# Patient Record
Sex: FEMALE | Race: WHITE | Employment: OTHER | URBAN - METROPOLITAN AREA
[De-identification: names, ages, dates, MRNs, and addresses within clinical notes are randomized per-mention and may not be internally consistent; named-entity substitution may affect disease eponyms.]

---

## 2017-01-05 ENCOUNTER — APPOINTMENT (OUTPATIENT)
Dept: GENERAL RADIOLOGY | Age: 72
DRG: 871 | End: 2017-01-05
Attending: EMERGENCY MEDICINE
Payer: MEDICARE

## 2017-01-05 ENCOUNTER — HOSPITAL ENCOUNTER (INPATIENT)
Age: 72
LOS: 12 days | Discharge: HOME | DRG: 871 | End: 2017-01-17
Attending: EMERGENCY MEDICINE | Admitting: FAMILY MEDICINE
Payer: MEDICARE

## 2017-01-05 DIAGNOSIS — J18.9 CAP (COMMUNITY ACQUIRED PNEUMONIA): Primary | ICD-10-CM

## 2017-01-05 PROBLEM — A41.9 SEPSIS (HCC): Status: ACTIVE | Noted: 2017-01-05

## 2017-01-05 LAB
ALBUMIN SERPL BCP-MCNC: 2.9 G/DL (ref 3.4–5)
ALBUMIN/GLOB SERPL: 1 {RATIO} (ref 0.8–1.7)
ALP SERPL-CCNC: 96 U/L (ref 45–117)
ALT SERPL-CCNC: 23 U/L (ref 13–56)
ANION GAP BLD CALC-SCNC: 12 MMOL/L (ref 3–18)
AST SERPL W P-5'-P-CCNC: 28 U/L (ref 15–37)
BASOPHILS # BLD AUTO: 0 K/UL (ref 0–0.1)
BASOPHILS # BLD: 0 % (ref 0–3)
BILIRUB SERPL-MCNC: 1.2 MG/DL (ref 0.2–1)
BUN SERPL-MCNC: 23 MG/DL (ref 7–18)
BUN/CREAT SERPL: 27 (ref 12–20)
CALCIUM SERPL-MCNC: 8.4 MG/DL (ref 8.5–10.1)
CHLORIDE SERPL-SCNC: 98 MMOL/L (ref 100–108)
CK MB CFR SERPL CALC: 0.8 % (ref 0–4)
CK MB SERPL-MCNC: 1.6 NG/ML (ref 0.5–3.6)
CK SERPL-CCNC: 205 U/L (ref 26–192)
CO2 SERPL-SCNC: 26 MMOL/L (ref 21–32)
CREAT SERPL-MCNC: 0.85 MG/DL (ref 0.6–1.3)
DIFFERENTIAL METHOD BLD: ABNORMAL
EOSINOPHIL # BLD: 0 K/UL (ref 0–0.4)
EOSINOPHIL NFR BLD: 0 % (ref 0–5)
ERYTHROCYTE [DISTWIDTH] IN BLOOD BY AUTOMATED COUNT: 12.8 % (ref 11.6–14.5)
GLOBULIN SER CALC-MCNC: 3 G/DL (ref 2–4)
GLUCOSE SERPL-MCNC: 66 MG/DL (ref 74–99)
HCT VFR BLD AUTO: 45.4 % (ref 35–45)
HGB BLD-MCNC: 16.2 G/DL (ref 12–16)
LACTATE BLD-SCNC: 1.7 MMOL/L (ref 0.4–2)
LACTATE BLD-SCNC: 2.3 MMOL/L (ref 0.4–2)
LYMPHOCYTES # BLD AUTO: 10 % (ref 20–51)
LYMPHOCYTES # BLD: 0.6 K/UL (ref 0.8–3.5)
MCH RBC QN AUTO: 41 PG (ref 24–34)
MCHC RBC AUTO-ENTMCNC: 35.7 G/DL (ref 31–37)
MCV RBC AUTO: 114.9 FL (ref 74–97)
MONOCYTES # BLD: 0.2 K/UL (ref 0–1)
MONOCYTES NFR BLD AUTO: 3 % (ref 2–9)
NEUTS BAND NFR BLD MANUAL: 46 % (ref 0–5)
NEUTS SEG # BLD: 2.3 K/UL (ref 1.8–8)
NEUTS SEG NFR BLD AUTO: 41 % (ref 42–75)
PLATELET # BLD AUTO: 220 K/UL (ref 135–420)
PLATELET COMMENTS,PCOM: ABNORMAL
PMV BLD AUTO: 10.2 FL (ref 9.2–11.8)
POTASSIUM SERPL-SCNC: 3.8 MMOL/L (ref 3.5–5.5)
PROT SERPL-MCNC: 5.9 G/DL (ref 6.4–8.2)
RBC # BLD AUTO: 3.95 M/UL (ref 4.2–5.3)
RBC MORPH BLD: ABNORMAL
SODIUM SERPL-SCNC: 136 MMOL/L (ref 136–145)
TROPONIN I SERPL-MCNC: <0.02 NG/ML (ref 0–0.04)
WBC # BLD AUTO: 5.5 K/UL (ref 4.6–13.2)

## 2017-01-05 PROCEDURE — 87077 CULTURE AEROBIC IDENTIFY: CPT | Performed by: EMERGENCY MEDICINE

## 2017-01-05 PROCEDURE — 82550 ASSAY OF CK (CPK): CPT | Performed by: EMERGENCY MEDICINE

## 2017-01-05 PROCEDURE — 74011250636 HC RX REV CODE- 250/636: Performed by: EMERGENCY MEDICINE

## 2017-01-05 PROCEDURE — 96360 HYDRATION IV INFUSION INIT: CPT

## 2017-01-05 PROCEDURE — 96361 HYDRATE IV INFUSION ADD-ON: CPT

## 2017-01-05 PROCEDURE — 74011000250 HC RX REV CODE- 250: Performed by: EMERGENCY MEDICINE

## 2017-01-05 PROCEDURE — 80053 COMPREHEN METABOLIC PANEL: CPT | Performed by: EMERGENCY MEDICINE

## 2017-01-05 PROCEDURE — 74011000250 HC RX REV CODE- 250: Performed by: FAMILY MEDICINE

## 2017-01-05 PROCEDURE — 93005 ELECTROCARDIOGRAM TRACING: CPT

## 2017-01-05 PROCEDURE — 87040 BLOOD CULTURE FOR BACTERIA: CPT | Performed by: EMERGENCY MEDICINE

## 2017-01-05 PROCEDURE — 85025 COMPLETE CBC W/AUTO DIFF WBC: CPT | Performed by: EMERGENCY MEDICINE

## 2017-01-05 PROCEDURE — 74011000258 HC RX REV CODE- 258: Performed by: EMERGENCY MEDICINE

## 2017-01-05 PROCEDURE — 96375 TX/PRO/DX INJ NEW DRUG ADDON: CPT

## 2017-01-05 PROCEDURE — 94640 AIRWAY INHALATION TREATMENT: CPT

## 2017-01-05 PROCEDURE — 96365 THER/PROPH/DIAG IV INF INIT: CPT

## 2017-01-05 PROCEDURE — 87186 SC STD MICRODIL/AGAR DIL: CPT | Performed by: EMERGENCY MEDICINE

## 2017-01-05 PROCEDURE — 99285 EMERGENCY DEPT VISIT HI MDM: CPT

## 2017-01-05 PROCEDURE — 74011250636 HC RX REV CODE- 250/636: Performed by: FAMILY MEDICINE

## 2017-01-05 PROCEDURE — 65660000000 HC RM CCU STEPDOWN

## 2017-01-05 PROCEDURE — 83605 ASSAY OF LACTIC ACID: CPT

## 2017-01-05 PROCEDURE — 77030013140 HC MSK NEB VYRM -A

## 2017-01-05 RX ORDER — ONDANSETRON 2 MG/ML
4 INJECTION INTRAMUSCULAR; INTRAVENOUS
Status: DISCONTINUED | OUTPATIENT
Start: 2017-01-05 | End: 2017-01-17 | Stop reason: HOSPADM

## 2017-01-05 RX ORDER — BUDESONIDE AND FORMOTEROL FUMARATE DIHYDRATE 160; 4.5 UG/1; UG/1
2 AEROSOL RESPIRATORY (INHALATION) 2 TIMES DAILY
COMMUNITY

## 2017-01-05 RX ORDER — IPRATROPIUM BROMIDE AND ALBUTEROL SULFATE 2.5; .5 MG/3ML; MG/3ML
3 SOLUTION RESPIRATORY (INHALATION)
Status: DISCONTINUED | OUTPATIENT
Start: 2017-01-05 | End: 2017-01-05

## 2017-01-05 RX ORDER — SODIUM CHLORIDE 0.9 % (FLUSH) 0.9 %
5-10 SYRINGE (ML) INJECTION AS NEEDED
Status: DISCONTINUED | OUTPATIENT
Start: 2017-01-05 | End: 2017-01-17 | Stop reason: HOSPADM

## 2017-01-05 RX ORDER — MORPHINE SULFATE 2 MG/ML
2 INJECTION, SOLUTION INTRAMUSCULAR; INTRAVENOUS
Status: DISCONTINUED | OUTPATIENT
Start: 2017-01-05 | End: 2017-01-17 | Stop reason: HOSPADM

## 2017-01-05 RX ORDER — ACETAMINOPHEN 325 MG/1
650 TABLET ORAL
Status: DISCONTINUED | OUTPATIENT
Start: 2017-01-05 | End: 2017-01-17 | Stop reason: HOSPADM

## 2017-01-05 RX ORDER — IPRATROPIUM BROMIDE AND ALBUTEROL SULFATE 2.5; .5 MG/3ML; MG/3ML
3 SOLUTION RESPIRATORY (INHALATION) ONCE
Status: COMPLETED | OUTPATIENT
Start: 2017-01-05 | End: 2017-01-05

## 2017-01-05 RX ORDER — BUDESONIDE AND FORMOTEROL FUMARATE DIHYDRATE 160; 4.5 UG/1; UG/1
2 AEROSOL RESPIRATORY (INHALATION) 2 TIMES DAILY
Status: DISCONTINUED | OUTPATIENT
Start: 2017-01-05 | End: 2017-01-05 | Stop reason: CLARIF

## 2017-01-05 RX ORDER — BUDESONIDE 0.5 MG/2ML
500 INHALANT ORAL
Status: DISCONTINUED | OUTPATIENT
Start: 2017-01-05 | End: 2017-01-13

## 2017-01-05 RX ORDER — MORPHINE SULFATE 4 MG/ML
4 INJECTION, SOLUTION INTRAMUSCULAR; INTRAVENOUS
Status: COMPLETED | OUTPATIENT
Start: 2017-01-05 | End: 2017-01-05

## 2017-01-05 RX ORDER — SODIUM CHLORIDE 9 MG/ML
125 INJECTION, SOLUTION INTRAVENOUS CONTINUOUS
Status: DISCONTINUED | OUTPATIENT
Start: 2017-01-05 | End: 2017-01-09

## 2017-01-05 RX ORDER — NALOXONE HYDROCHLORIDE 0.4 MG/ML
0.4 INJECTION, SOLUTION INTRAMUSCULAR; INTRAVENOUS; SUBCUTANEOUS AS NEEDED
Status: DISCONTINUED | OUTPATIENT
Start: 2017-01-05 | End: 2017-01-17 | Stop reason: HOSPADM

## 2017-01-05 RX ORDER — ONDANSETRON 2 MG/ML
4 INJECTION INTRAMUSCULAR; INTRAVENOUS
Status: COMPLETED | OUTPATIENT
Start: 2017-01-05 | End: 2017-01-05

## 2017-01-05 RX ORDER — ENOXAPARIN SODIUM 100 MG/ML
40 INJECTION SUBCUTANEOUS EVERY 24 HOURS
Status: DISCONTINUED | OUTPATIENT
Start: 2017-01-05 | End: 2017-01-17 | Stop reason: HOSPADM

## 2017-01-05 RX ORDER — IPRATROPIUM BROMIDE AND ALBUTEROL SULFATE 2.5; .5 MG/3ML; MG/3ML
3 SOLUTION RESPIRATORY (INHALATION)
Status: DISCONTINUED | OUTPATIENT
Start: 2017-01-05 | End: 2017-01-17 | Stop reason: HOSPADM

## 2017-01-05 RX ORDER — ARFORMOTEROL TARTRATE 15 UG/2ML
15 SOLUTION RESPIRATORY (INHALATION)
Status: DISCONTINUED | OUTPATIENT
Start: 2017-01-05 | End: 2017-01-13

## 2017-01-05 RX ADMIN — CEFTRIAXONE 2 G: 2 INJECTION, POWDER, FOR SOLUTION INTRAMUSCULAR; INTRAVENOUS at 19:33

## 2017-01-05 RX ADMIN — ARFORMOTEROL TARTRATE 15 MCG: 15 SOLUTION RESPIRATORY (INHALATION) at 21:38

## 2017-01-05 RX ADMIN — IPRATROPIUM BROMIDE AND ALBUTEROL SULFATE 3 ML: .5; 3 SOLUTION RESPIRATORY (INHALATION) at 21:38

## 2017-01-05 RX ADMIN — BUDESONIDE 500 MCG: 0.5 SUSPENSION RESPIRATORY (INHALATION) at 21:38

## 2017-01-05 RX ADMIN — Medication 10 ML: at 21:44

## 2017-01-05 RX ADMIN — Medication 10 ML: at 21:38

## 2017-01-05 RX ADMIN — SODIUM CHLORIDE 1632 ML: 900 INJECTION, SOLUTION INTRAVENOUS at 18:03

## 2017-01-05 RX ADMIN — SODIUM CHLORIDE 125 ML/HR: 900 INJECTION, SOLUTION INTRAVENOUS at 22:02

## 2017-01-05 RX ADMIN — ENOXAPARIN SODIUM 40 MG: 40 INJECTION SUBCUTANEOUS at 21:38

## 2017-01-05 RX ADMIN — IPRATROPIUM BROMIDE AND ALBUTEROL SULFATE 3 ML: .5; 3 SOLUTION RESPIRATORY (INHALATION) at 19:33

## 2017-01-05 RX ADMIN — ONDANSETRON 4 MG: 2 INJECTION INTRAMUSCULAR; INTRAVENOUS at 19:54

## 2017-01-05 RX ADMIN — SODIUM CHLORIDE 500 MG: 900 INJECTION, SOLUTION INTRAVENOUS at 20:29

## 2017-01-05 RX ADMIN — Medication 4 MG: at 19:54

## 2017-01-05 NOTE — IP AVS SNAPSHOT
Current Discharge Medication List  
  
Take these medications at their scheduled times Dose & Instructions Dispensing Information Comments Morning Noon Evening Bedtime  
 aspirin 81 mg chewable tablet Your next dose is: Today, Tomorrow Other:  ____________ Dose:  81 mg Take 1 Tab by mouth daily. Quantity:  30 Tab Refills:  0  
     
   
   
   
  
 carvedilol 3.125 mg tablet Commonly known as:  Macel Edilson Your next dose is: Today, Tomorrow Other:  ____________ Dose:  3.125 mg Take 1 Tab by mouth two (2) times daily (with meals). Quantity:  60 Tab Refills:  0  
     
   
   
   
  
 furosemide 20 mg tablet Commonly known as:  LASIX Your next dose is: Today, Tomorrow Other:  ____________ Dose:  40 mg Take 2 Tabs by mouth daily. Quantity:  60 Tab Refills:  0  
     
   
   
   
  
 levoFLOXacin 500 mg tablet Commonly known as:  Thersia Bring Your next dose is: Today, Tomorrow Other:  ____________ Dose:  500 mg Take 1 Tab by mouth every twenty-four (24) hours. Quantity:  5 Tab Refills:  0  
     
   
   
   
  
 lisinopril 2.5 mg tablet Commonly known as:  Marycruz Needy Your next dose is: Today, Tomorrow Other:  ____________ Dose:  2.5 mg Take 1 Tab by mouth daily. Quantity:  30 Tab Refills:  0  
     
   
   
   
  
 potassium chloride 20 mEq tablet Commonly known as:  K-DUR, KLOR-CON Your next dose is: Today, Tomorrow Other:  ____________ Dose:  20 mEq Take 1 Tab by mouth two (2) times a day. Quantity:  14 Tab Refills:  0  
     
   
   
   
  
 SYMBICORT 160-4.5 mcg/actuation HFA inhaler Generic drug:  budesonide-formoterol Your next dose is: Today, Tomorrow Other:  ____________ Dose:  2 Puff Take 2 Puffs by inhalation two (2) times a day. Refills:  0 Take these medications as needed Dose & Instructions Dispensing Information Comments Morning Noon Evening Bedtime  
 albuterol 90 mcg/actuation inhaler Commonly known as:  PROVENTIL HFA, VENTOLIN HFA, PROAIR HFA Your next dose is: Today, Tomorrow Other:  ____________ Dose:  1 Puff Take 1 Puff by inhalation every four (4) hours as needed for Wheezing. Quantity:  1 Inhaler Refills:  0 Take these medications as directed Dose & Instructions Dispensing Information Comments Morning Noon Evening Bedtime  
 predniSONE 10 mg tablet Commonly known as:  Reino Mcdaniels Your next dose is: Today, Tomorrow Other:  ____________ Take 2 tabs on day 1, take 1 tab on day 2, take 1 tab on day 3 Quantity:  4 Tab Refills:  0 Where to Get Your Medications Information about where to get these medications is not yet available ! Ask your nurse or doctor about these medications  
  albuterol 90 mcg/actuation inhaler  
 aspirin 81 mg chewable tablet  
 carvedilol 3.125 mg tablet  
 furosemide 20 mg tablet  
 levoFLOXacin 500 mg tablet  
 lisinopril 2.5 mg tablet  
 potassium chloride 20 mEq tablet  
 predniSONE 10 mg tablet

## 2017-01-05 NOTE — ED TRIAGE NOTES
Pt feeling \"sick\" for about 1 week. Pt seen at [facility] today and found to be hypoxic, tachycardic, and hypotensive. Pt transferred via EMS to this facility. Pt O2 sats improved on 4L NC to low 90s. Sepsis bundle initiated.

## 2017-01-05 NOTE — IP AVS SNAPSHOT
[address] [address] 
[phone] Patient: Jorge Elise MRN: [id number]:[id number] You are allergic to the following No active allergies Immunizations Administered for This Admission Name Date Influenza Vaccine (Quad) PF 1/9/2017 Recent Documentation Height Weight BMI OB Status Smoking Status 1.651 m 54.4 kg 19.97 kg/m2 Hysterectomy Current Every Day Smoker Unresulted Labs Order Current Status CULTURE, BODY FLUID W GRAM STAIN Preliminary result Emergency Contacts Name Discharge Info Relation Home Work Mobile Lc Mckeon  Spouse [3] [phone] About your hospitalization You were admitted on:  January 5, 2017 You last received care in the:  [address] [place], [place] [address] You were discharged on:  January 17, 2017 Unit phone number:  [phone] Why you were hospitalized Your primary diagnosis was:  Not on File Your diagnoses also included:  Pneumonia, Sepsis (Hcc) Providers Seen During Your Hospitalizations Provider Role Specialty Primary office phone Stewart Rainey MD Attending Provider Emergency Medicine [phone] Leighton Marshall MD Attending Provider [facility] [phone] Liat Brush MD Attending Provider Internal Medicine [phone] Vevelyn Kocher, MD Attending Provider [facility] [phone] Nancy Shell MD Attending Provider Internal Medicine [phone] Your Primary Care Physician (PCP) Primary Care Physician Office Phone Office Fax Yolande Hernandez [phone] Follow-up Information Follow up With Details Comments Contact Info Chula Arboleda MD Schedule an appointment as soon as possible for a visit in 1 week for 1 week from now. Follow up Chest XRAY [address] [phone] Current Discharge Medication List  
  
START taking these medications Dose & Instructions Dispensing Information Comments Morning Noon Evening Bedtime  
 albuterol 90 mcg/actuation inhaler Commonly known as:  PROVENTIL HFA, VENTOLIN HFA, PROAIR HFA Your next dose is: Today, Tomorrow Other:  _________ Dose:  1 Puff Take 1 Puff by inhalation every four (4) hours as needed for Wheezing. Quantity:  1 Inhaler Refills:  0  
     
   
   
   
  
 aspirin 81 mg chewable tablet Your next dose is: Today, Tomorrow Other:  _________ Dose:  81 mg Take 1 Tab by mouth daily. Quantity:  30 Tab Refills:  0  
     
   
   
   
  
 carvedilol 3.125 mg tablet Commonly known as:  Willisa Dwyer Your next dose is: Today, Tomorrow Other:  _________ Dose:  3.125 mg Take 1 Tab by mouth two (2) times daily (with meals). Quantity:  60 Tab Refills:  0  
     
   
   
   
  
 furosemide 20 mg tablet Commonly known as:  LASIX Your next dose is: Today, Tomorrow Other:  _________ Dose:  40 mg Take 2 Tabs by mouth daily. Quantity:  60 Tab Refills:  0  
     
   
   
   
  
 levoFLOXacin 500 mg tablet Commonly known as:  Aldean Lefort Your next dose is: Today, Tomorrow Other:  _________ Dose:  500 mg Take 1 Tab by mouth every twenty-four (24) hours. Quantity:  5 Tab Refills:  0  
     
   
   
   
  
 lisinopril 2.5 mg tablet Commonly known as:  Donnald Code Your next dose is: Today, Tomorrow Other:  _________ Dose:  2.5 mg Take 1 Tab by mouth daily. Quantity:  30 Tab Refills:  0  
     
   
   
   
  
 potassium chloride 20 mEq tablet Commonly known as:  K-DUR, KLOR-CON Your next dose is: Today, Tomorrow Other:  _________ Dose:  20 mEq Take 1 Tab by mouth two (2) times a day. Quantity:  14 Tab Refills:  0 predniSONE 10 mg tablet Commonly known as:  Pedro Ortiz Your next dose is: Today, Tomorrow Other:  _________ Take 2 tabs on day 1, take 1 tab on day 2, take 1 tab on day 3 Quantity:  4 Tab Refills:  0 CONTINUE these medications which have NOT CHANGED Dose & Instructions Dispensing Information Comments Morning Noon Evening Bedtime SYMBICORT 160-4.5 mcg/actuation HFA inhaler Generic drug:  budesonide-formoterol Your next dose is: Today, Tomorrow Other:  _________ Dose:  2 Puff Take 2 Puffs by inhalation two (2) times a day. Refills:  0 Where to Get Your Medications Information on where to get these meds will be given to you by the nurse or doctor. ! Ask your nurse or doctor about these medications  
  albuterol 90 mcg/actuation inhaler  
 aspirin 81 mg chewable tablet  
 carvedilol 3.125 mg tablet  
 furosemide 20 mg tablet  
 levoFLOXacin 500 mg tablet  
 lisinopril 2.5 mg tablet  
 potassium chloride 20 mEq tablet  
 predniSONE 10 mg tablet Discharge Instructions None Discharge Instructions Attachments/References ASPIRIN (BY MOUTH) (ENGLISH) CARVEDILOL (BY MOUTH) (ENGLISH) LEVOFLOXACIN (BY MOUTH) (ENGLISH) LISINOPRIL (BY MOUTH) (ENGLISH) POTASSIUM CHLORIDE (BY MOUTH) (ENGLISH) PREDNISONE (BY MOUTH) (ENGLISH) FUROSEMIDE (BY MOUTH) (ENGLISH) ALBUTEROL (BY BREATHING) (ENGLISH) PNEUMONIA (ENGLISH) Discharge Orders None [facility] Announcement We are excited to announce that we are making your provider's discharge notes available to you in NextDocst. You will see these notes when they are completed and signed by the physician that discharged you from your recent hospital stay.   If you have any questions or concerns about any information you see in [facility] Inchart, please call the [facility] where you were seen or reach out to your Primary Care Provider for more information about your plan of care. Introducing [facility]! Lee Ann Gutierrez introduces KUBOO patient portal. Now you can access parts of your medical record, email your doctor's office, and request medication refills online. 1. In your internet browser, go to [email or web address] 2. Click on the First Time User? Click Here link in the Sign In box. You will see the New Member Sign Up page. 3. Enter your KUBOO Access Code exactly as it appears below. You will not need to use this code after youve completed the sign-up process. If you do not sign up before the expiration date, you must request a new code. · KUBOO Access Code: [email or web address] Expires: 4/5/2017  5:08 PM 
 
4. Enter the last four digits of your Social Security Number (xxxx) and Date of Birth (mm/dd/yyyy) as indicated and click Submit. You will be taken to the next sign-up page. 5. Create a KUBOO ID. This will be your KUBOO login ID and cannot be changed, so think of one that is secure and easy to remember. 6. Create a KUBOO password. You can change your password at any time. 7. Enter your Password Reset Question and Answer. This can be used at a later time if you forget your password. 8. Enter your e-mail address. You will receive e-mail notification when new information is available in [address] 9. Click Sign Up. You can now view and download portions of your medical record. 10. Click the Download Summary menu link to download a portable copy of your medical information. If you have questions, please visit the Frequently Asked Questions section of the KUBOO website. Remember, KUBOO is NOT to be used for urgent needs. For medical emergencies, dial 911. Now available from your iPhone and Android! General Information Please provide this summary of care documentation to your next provider. Patient Signature:  ____________________________________________________________ Date:  ____________________________________________________________  
  
Maik Roberts Provider Signature:  ____________________________________________________________ Date:  ____________________________________________________________ More Information Aspirin (By mouth) Aspirin (AS-pir-in) Treats pain, fever, and inflammation. May lower risk of heart attack and stroke. Brand Name(s):Ascriptin Regular Strength, AsperDrink, Aspergum, Aspir Low, Aspir-Codie, Aspirin Adult Low Dose, Amado Aspirin Children's, Amado Aspirin Regimen, Amado Extra Strength, Amado Genuine Aspirin, Bufferin, Bufferin Low Dose, Durlaza, Ecotrin, Ecpirin There may be other brand names for this medicine. When This Medicine Should Not Be Used: This medicine is not right for everyone. Do not use it if you had an allergic reaction to aspirin or other NSAIDs, or if you have a history of asthma with nasal polyps and rhinitis. How to Use This Medicine:  
Delayed Release Capsule, Long Acting Capsule, Gum, Tablet, Chewable Tablet, Fizzy Tablet, Coated Tablet, Long Acting Tablet, 24 Hour Capsule · Your doctor will tell you how much medicine to use. Do not use more than directed. · It is best to take this medicine with food or milk. · Capsule, tablet, or coated tablet: Swallow whole. Do not crush, break, or chew it. · Chewable tablet: You may chew it completely or swallow it whole. · Gum: Chew completely to make sure you get as much medicine as possible. Drink a full glass (8 ounces) of water after chewing the gum. · Swallow the extended-release capsule whole. Do not crush, break, or chew it. Take the capsule with a full glass of water at the same time each day.  
· Follow the instructions on the medicine label if you are using this medicine without a prescription. · Missed dose: If you miss a dose of Durlaza, skip the missed dose and go back to your regular dosing schedule. Do not take extra medicine to make up for a missed dose. · Store the medicine in a closed container at room temperature, away from heat, moisture, and direct light. Drugs and Foods to Avoid: Ask your doctor or pharmacist before using any other medicine, including over-the-counter medicines, vitamins, and herbal products. · Some foods and medicines can affect how aspirin works. Tell your doctor if you are using any of the following: ¨ Dipyridamole, methotrexate, probenecid, sulfinpyrazone, ticlopidine ¨ Blood thinner (including clopidogrel, prasugrel, ticagrelor, warfarin) ¨ Blood pressure medicine ¨ Medicine to treat seizures (including phenytoin, valproic acid) ¨ NSAID pain or arthritis medicine (including celecoxib, diclofenac, ibuprofen, naproxen) ¨ Steroid medicine (including dexamethasone, hydrocortisone, methylprednisolone, prednisolone, prednisone) · Do not take Durlaza 2 hours before or 1 hour after you drink alcohol or take medicines that contain alcohol. Warnings While Using This Medicine: · Tell your doctor if you are pregnant or breastfeeding. Do not use this medicine during the later part of a pregnancy unless your doctor tells you to. · Tell your doctor if you have kidney disease, liver disease, high blood pressure, heart disease, or a history of stomach bleeding or ulcers. · This medicine may increase your risk for bleeding, including stomach ulcers. · Do not give aspirin to a child or teenager who has chickenpox or flu symptoms, unless the doctor says it is okay. Aspirin can cause a life-threatening reaction called Reye syndrome. · Tell any doctor or dentist who treats you that you are using this medicine. This medicine may affect certain medical test results. · Keep all medicine out of the reach of children.  Never share your medicine with anyone. Possible Side Effects While Using This Medicine:  
Call your doctor right away if you notice any of these side effects: · Allergic reaction: Itching or hives, swelling in your face or hands, swelling or tingling in your mouth or throat, chest tightness, trouble breathing · Bloody or black stools, bloody vomit or vomit that looks like coffee grounds · Chest tightness, wheezing · Ringing in the ears · Severe stomach pain · Unusual bleeding, bruising, or weakness If you notice other side effects that you think are caused by this medicine, tell your doctor. Call your doctor for medical advice about side effects. You may report side effects to FDA at 3-487-REW-4417 © 2016 [address] is for End User's use only and may not be sold, redistributed or otherwise used for commercial purposes. The above information is an  only. It is not intended as medical advice for individual conditions or treatments. Talk to your doctor, nurse or pharmacist before following any medical regimen to see if it is safe and effective for you. Carvedilol (By mouth) Carvedilol (wgs-PD-wqg-ol) Treats high blood pressure and heart failure. Also reduces the risk of death after a heart attack. This medicine is a beta-blocker. Brand Name(s):Coreg, Coreg CR There may be other brand names for this medicine. When This Medicine Should Not Be Used: This medicine is not right for everyone. Do not use it if you had an allergic reaction to carvedilol, or if you have asthma, severe liver disease, or certain heart problems. Ask your doctor about these heart problems. How to Use This Medicine:  
Long Acting Capsule, Tablet · Take your medicine as directed. Your dose may need to be changed several times to find what works best for you. · It is best to take this medicine with food or milk. · Extended-release capsule instructions: ¨ Take the capsule in the morning with food. ¨ Swallow the capsule whole. Do not crush or chew it. ¨ If you cannot swallow the capsule, you may open it and sprinkle the medicine over a spoonful of applesauce. Swallow the applesauce right away. · Read and follow the patient instructions that come with this medicine. Talk to your doctor or pharmacist if you have any questions. · Store the medicine in a closed container at room temperature, away from heat, moisture, and direct light. · Missed dose: Take a dose as soon as you remember. If it is almost time for your next dose, wait until then and take a regular dose. Do not take extra medicine to make up for a missed dose. Drugs and Foods to Avoid: Ask your doctor or pharmacist before using any other medicine, including over-the-counter medicines, vitamins, and herbal products. · Some medicines can affect how carvedilol works. Tell your doctor if you are using amiodarone, clonidine, diltiazem, cyclosporine, digoxin, fluconazole, reserpine, rifampin, verapamil, or an MAO inhibitor (MAOI). Warnings While Using This Medicine: · Tell your doctor if you are pregnant or breastfeeding, or if you have kidney disease, liver disease, bradycardia (slow heartbeat), coronary artery disease, circulation problems, edema (fluid retention or swelling), heart or blood vessel problems, low blood pressure, lung problems (such as bronchitis or emphysema), an overactive thyroid, pheochromocytoma, or frequent chest pains. Tell your doctor if you have a history of severe allergic reactions or if you are scheduled to have surgery. · This medicine may cause the following problems:  
¨ Changes to your blood sugar level (if you have diabetes, report any blood sugar level changes to your doctor) ¨ Fewer tears than usual in contact lens wearers ¨ An eye problem called Intraoperative Floppy Iris Syndrome during cataract surgery · This medicine may make you dizzy or drowsy. Do not drive, use machines, or do anything else that could be dangerous if you are not alert. · Do not stop using this medicine suddenly. Your doctor will need to slowly decrease your dose before you stop it completely. · Keep all medicine out of the reach of children. Never share your medicine with anyone. Possible Side Effects While Using This Medicine:  
Call your doctor right away if you notice any of these side effects: · Allergic reaction: Itching or hives, swelling in your face or hands, swelling or tingling in your mouth or throat, chest tightness, trouble breathing · Change in how much or how often you urinate · Chest pain that may spread to your arms, jaw, back, or neck, trouble breathing, nausea, unusual sweating, faintness · Leg pain when you walk, legs and feet that feel cold or numb · Lightheadedness, dizziness, or fainting · Rapid weight gain, swelling in your hands, ankles, or feet · Shaking, trembling, sweating, hunger, confusion · Slow, fast, or uneven heartbeat · Unusual bleeding or bruising · Wheezing or trouble breathing If you notice these less serious side effects, talk with your doctor: · Diarrhea · Trouble having sex · Unusual tiredness or weakness If you notice other side effects that you think are caused by this medicine, tell your doctor. Call your doctor for medical advice about side effects. You may report side effects to FDA at 9-481-FDA-1520 © 2016 [address] is for End User's use only and may not be sold, redistributed or otherwise used for commercial purposes. The above information is an  only. It is not intended as medical advice for individual conditions or treatments. Talk to your doctor, nurse or pharmacist before following any medical regimen to see if it is safe and effective for you. Levofloxacin (By mouth) Levofloxacin (tai-gnw-QHDX-a-sin) Treats infections. This medicine is a quinolone antibiotic. Brand Name(s):Levaquin, Levaquin Leva-narendra There may be other brand names for this medicine. When This Medicine Should Not Be Used: This medicine is not right for everyone. Do not use if you had an allergic reaction to levofloxacin or similar medicines. How to Use This Medicine:  
Liquid, Tablet · Your doctor will tell you how much medicine to use. Do not use more than directed. · Take your medicine at the same time each day. ¨ Tablet: Take the tablet with or without food. ¨ Liquid: Take the liquid medicine 1 hour before or 2 hours after you eat. Measure the oral liquid medicine with a marked measuring spoon, oral syringe, or medicine cup. · Take all of the medicine in your prescription to clear up your infection, even if you feel better after the first few doses. · Drink extra fluids so you will urinate more often and help prevent kidney problems. · This medicine should come with a Medication Guide. Ask your pharmacist for a copy if you do not have one. · Missed dose: Take a dose as soon as you remember. If it is almost time for your next dose, wait until then and take a regular dose. Do not take extra medicine to make up for a missed dose. · Store the medicine in a closed container at room temperature, away from heat, moisture, and direct light. Drugs and Foods to Avoid: Ask your doctor or pharmacist before using any other medicine, including over-the-counter medicines, vitamins, and herbal products. · Some medicines and foods can affect how levofloxacin works. Tell your doctor if you are using any of the following: ¨ Theophylline ¨ Steroid medicine (such as hydrocortisone, methylprednisolone, prednisone) ¨ Blood thinner (such as warfarin) ¨ Diabetes medicine ¨ NSAID pain or arthritis medicine (such as aspirin, diclofenac, ibuprofen, naproxen, celecoxib) ¨ Medicine for heart rhythm problems (such as quinidine, procainamide, amiodarone, sotalol) · Some minerals and medicines can keep your body from absorbing this medicine. You may need to take levofloxacin at least 2 hours before or after you take these medicines. These include antacids that contain magnesium or aluminum; magnesium, zinc, or iron supplements; sucralfate; and didanosine. Ask your pharmacist for more information. Warnings While Using This Medicine: · Tell your doctor if you are pregnant or breastfeeding, or if you have kidney disease, liver disease, diabetes, heart disease, heart rhythm problems (such as QT prolongation or a slow heartbeat), myasthenia gravis, or a history of seizures, epilepsy, head injury, or stroke. Tell your doctor if you have ever had tendon or joint problems, including rheumatoid arthritis, or if you have received a transplant. · This medicine may cause the following problems: 
¨ Tendinitis and tendon rupture (may happen after treatment ends) ¨ Liver damage ¨ Severe diarrhea ¨ Nerve damage in the arms or legs ¨ Heart rhythm changes ¨ Blood sugar level changes · This medicine may make you feel dizzy or lightheaded. Do not drive or do anything else that could be dangerous until you know how this medicine affects you. · This medicine can cause diarrhea. Call your doctor if the diarrhea becomes severe, does not stop, or is bloody. Do not take any medicine to stop diarrhea until you have talked to your doctor. Diarrhea can occur 2 months or more after you stop taking this medicine. · This medicine may make your skin more sensitive to sunlight. Wear sunscreen. Do not use sunlamps or tanning beds. · Call your doctor if your symptoms do not improve or if they get worse. · Tell any doctor or dentist who treats you that you are using this medicine. This medicine may affect certain medical test results. · Keep all medicine out of the reach of children. Never share your medicine with anyone. Possible Side Effects While Using This Medicine: Call your doctor right away if you notice any of these side effects: · Allergic reaction: Itching or hives, swelling in your face or hands, swelling or tingling in your mouth or throat, chest tightness, trouble breathing · Blistering, peeling, or red skin rash · Change in how much or how often you urinate · Dark urine or pale stools, nausea, vomiting, loss of appetite, stomach pain, yellow skin or eyes · Diarrhea that may contain blood · Fainting, dizziness, or lightheadedness · Fast, slow, or uneven heartbeat, chest pain · Numbness, tingling, or burning pain in your hands, arms, legs, or feet · Pain, stiffness, swelling, or bruises around your ankle, leg, shoulder, or other joint · Seizures, severe headache, unusual thoughts or behaviors, trouble sleeping, confusion · Unusual bleeding, bruising, or weakness If you notice these less serious side effects, talk with your doctor: · Mild headache · Mild nausea or diarrhea If you notice other side effects that you think are caused by this medicine, tell your doctor. Call your doctor for medical advice about side effects. You may report side effects to FDA at 1-602-FDA-7695 © 2016 [address] is for End User's use only and may not be sold, redistributed or otherwise used for commercial purposes. The above information is an  only. It is not intended as medical advice for individual conditions or treatments. Talk to your doctor, nurse or pharmacist before following any medical regimen to see if it is safe and effective for you. Lisinopril (By mouth) Lisinopril (lye-SIN-oh-pril) Treats high blood pressure and heart failure. Also given to reduce the risk of death after a heart attack. This medicine is an ACE inhibitor. Brand Name(s):Prinivil, Zestril There may be other brand names for this medicine. When This Medicine Should Not Be Used: This medicine is not right for everyone. Do not use it if you had an allergic reaction to lisinopril or another ACE inhibitor, or if you are pregnant. How to Use This Medicine:  
Tablet · Take your medicine as directed. Your dose may need to be changed several times to find what works best for you. · Missed dose: Take a dose as soon as you remember. If it is almost time for your next dose, wait until then and take a regular dose. Do not take extra medicine to make up for a missed dose. · Store the medicine in a closed container at room temperature, away from heat, moisture, and direct light. Drugs and Foods to Avoid: Ask your doctor or pharmacist before using any other medicine, including over-the-counter medicines, vitamins, and herbal products. · Do not use this medicine together with aliskiren if you have diabetes. · Some foods and medicines may affect how lisinopril works. Tell your doctor if you are using any of the following: ¨ Aliskiren, everolimus, lithium, sirolimus, temsirolimus ¨ Another blood pressure medicine, including an angiotensin receptor blocker (ARB) ¨ Insulin or diabetes medicine ¨ Diuretic (water pills, including amiloride, spironolactone, triamterene) ¨ NSAID pain or arthritis medicine (including aspirin, celecoxib, diclofenac, ibuprofen, naproxen) · Ask your doctor before you use any medicine, supplement, or salt substitute that contains potassium. Warnings While Using This Medicine: · It is not safe to take this medicine during pregnancy. It could harm an unborn baby. Tell your doctor right away if you become pregnant. · Tell your doctor if you are breastfeeding, or if you have kidney disease, liver disease, diabetes, or heart or blood vessel disease. · This medicine may cause the following problems: ¨ Angioedema (severe swelling) ¨ Kidney problems ¨ Serious liver problems · This medicine could lower your blood pressure too much, especially when you first use it or if you are dehydrated. Stand or sit up slowly if you feel lightheaded or dizzy. · Do not stop using this medicine without asking your doctor, even if you feel well. This medicine will not cure your high blood pressure, but it will help keep it in a normal range. You may have to take blood pressure medicine for the rest of your life. · Tell any doctor or dentist who treats you that you are using this medicine. · Your doctor will do lab tests at regular visits to check on the effects of this medicine. Keep all appointments. · Keep all medicine out of the reach of children. Never share your medicine with anyone. Possible Side Effects While Using This Medicine:  
Call your doctor right away if you notice any of these side effects: · Allergic reaction: Itching or hives, swelling in your face or hands, swelling or tingling in your mouth or throat, chest tightness, trouble breathing · Blistering, peeling, or red skin rash · Change in how much or how often you urinate · Confusion, weakness, uneven heartbeat, trouble breathing, numbness or tingling in your hands, feet, or lips · Dark urine or pale stools, nausea, vomiting, loss of appetite, stomach pain, yellow skin or eyes · Fever, chills, sore throat, body aches · Lightheadedness, dizziness, fainting · Severe stomach pain (with or without nausea or vomiting) If you notice these less serious side effects, talk with your doctor: · Dry cough If you notice other side effects that you think are caused by this medicine, tell your doctor. Call your doctor for medical advice about side effects. You may report side effects to FDA at 3-540-FDA-7712 © 2016 [address] is for End User's use only and may not be sold, redistributed or otherwise used for commercial purposes. The above information is an  only. It is not intended as medical advice for individual conditions or treatments.  Talk to your doctor, nurse or pharmacist before following any medical regimen to see if it is safe and effective for you. Potassium Chloride (By mouth) Potassium Chloride (owf-DIH-od-um KLOR-red) Prevents and treats low potassium levels in the blood. Brand Name(s):K-Sol, K-Tab, [address], Klor-Con, Klor-Con 10, Klor-Con 8, Klor-Con M10, Klor-Con M15, Klor-Con M20, Klor-Con Sprinkle There may be other brand names for this medicine. When This Medicine Should Not Be Used: This medicine is not right for everyone. Do not use if you had an allergic reaction to potassium. How to Use This Medicine:  
Tablet, Long Acting Capsule, Powder, Liquid, Long Acting Tablet, Granule · Your doctor will tell you how much medicine to use. Do not use more than directed. · Take this medicine with food or right after eating, to avoid stomach upset. · Powder or oral liquid:  You must mix with at least one-half cup (4 ounces) of water or juice. You could damage your stomach if you take the medicine without mixing it with water or juice. · Tablet or capsule: Do not chew, crush, or break. Swallow it whole with full glass of water. · Extended-release capsule: Swallow whole with a full glass of water. If you cannot swallow the extended-release capsule, you may open it and pour the medicine into a small amount of soft food such as pudding, yogurt, or applesauce. Stir this mixture well and swallow it without chewing. · Extended-release tablet:  Swallow whole with a full glass of water. If you have trouble swallowing, ask your doctor or pharmacist if you may crush the tablet. Some brands of this medicine must be swallowed whole, but other brands may be crushed. · If you mix the medicine in water or soft food, do not mix until you are ready to take your dose. Do not save mixed medicine for later use. · Carefully follow your doctor's instructions about any special diet. · Missed dose: Take a dose as soon as you remember. If it is almost time for your next dose, wait until then and take a regular dose. Do not take extra medicine to make up for a missed dose. · Store the medicine in a closed container at room temperature, away from heat, moisture, and direct light. Drugs and Foods to Avoid: Ask your doctor or pharmacist before using any other medicine, including over-the-counter medicines, vitamins, and herbal products. · Some foods and medicines can affect how potassium chloride works. Tell you doctor if you are using any of the following: ¨ Potassium-sparing diuretic (water pill) ¨ ACE inhibitor blood pressure medicine ¨ Salt substitute ¨ Digoxin Warnings While Using This Medicine: · Tell your doctor if you are pregnant or breastfeeding or if you have kidney disease, heart disease, or problems with your digestive system. · This medicine may cause the following problems: ¨ Bleeding or ulcers in the digestive system ¨ Potassium levels that are too high · Your doctor will do lab tests at regular visits to check on the effects of this medicine. Keep all appointments. · Keep all medicine out of the reach of children. Never share your medicine with anyone. Possible Side Effects While Using This Medicine:  
Call your doctor right away if you notice any of these side effects: · Allergic reaction: Itching or hives, swelling in your face or hands, swelling or tingling in your mouth or throat, chest tightness, trouble breathing · Bloody or black, tarry stools · Confusion, weakness, uneven heartbeat, trouble breathing, numbness in your hands, feet, or lips · Severe stomach pain or vomiting · Throat pain, feeling as if pill is stuck in the throat If you notice these less serious side effects, talk with your doctor: · Mild nausea, diarrhea, gas If you notice other side effects that you think are caused by this medicine, tell your doctor. Call your doctor for medical advice about side effects. You may report side effects to FDA at 5-856-KUY-6321 © 2016 [address] is for End User's use only and may not be sold, redistributed or otherwise used for commercial purposes. The above information is an  only. It is not intended as medical advice for individual conditions or treatments. Talk to your doctor, nurse or pharmacist before following any medical regimen to see if it is safe and effective for you. Prednisone (By mouth) Prednisone (PRED-ni-sone) Treats many diseases and conditions, especially problems related to inflammation. This medicine is a corticosteroid. Brand Name(s):Deltasone, Prednicot, Areli, Sterapred DS, predniSONE Intensol There may be other brand names for this medicine. When This Medicine Should Not Be Used: This medicine is not right for everyone. Do not use if you had an allergic reaction to prednisone or if you are pregnant. How to Use This Medicine:  
Liquid, Tablet, Delayed Release Tablet · Take your medicine as directed. Your dose may need to be changed several times to find what works best for you. · It is best to take this medicine with food or milk. · Swallow the delayed-release tablet whole. Do not crush, break, or chew it. · Measure the oral liquid medicine with a marked measuring spoon, oral syringe, or medicine cup. · Missed dose: Take a dose as soon as you remember. If it is almost time for your next dose, wait until then and take a regular dose. Do not take extra medicine to make up for a missed dose. · Store the medicine in a closed container at room temperature, away from heat, moisture, and direct light. Do not freeze the oral liquid. Drugs and Foods to Avoid: Ask your doctor or pharmacist before using any other medicine, including over-the-counter medicines, vitamins, and herbal products. · Tell your doctor if you use any of the following: ¨ Aminoglutethimide, amphotericin B, carbamazepine, cholestyramine, cyclosporine, digoxin, isoniazid, ketoconazole, phenobarbital, phenytoin, or rifampin ¨ Blood thinner, such as warfarin ¨ NSAID pain or arthritis medicine, such as aspirin, diclofenac, ibuprofen, naproxen, celecoxib ¨ Diuretic (water pill) ¨ Diabetes medicine ¨ Macrolide antibiotic, such as azithromycin, clarithromycin, erythromycin ¨ Estrogen, including birth control pills or hormone replacement therapy · This medicine may interfere with vaccines. Ask your doctor before you get a flu shot or any other vaccines. Warnings While Using This Medicine: · It is not safe to take this medicine during pregnancy. It could harm an unborn baby. Tell your doctor right away if you become pregnant. · Tell your doctor if you are breastfeeding or if you have kidney problems, heart failure, high blood pressure, a recent heart attack, diabetes, glaucoma, osteoporosis, or thyroid problems. Tell your doctor about any infection you have. Also tell your doctor if you have had mental or emotional problems (such as depression) or stomach or bowel problems (such as an ulcer or diverticulitis). · This medicine may cause the following problems: ¨ Mood or behavior changes ¨ Higher blood pressure, retaining water, changes in salt or potassium levels in your body ¨ Cataracts or glaucoma (with long-term use) ¨ Weak bones or osteoporosis (with long-term use) ¨ Slow growth in children (with long-term use) ¨ Muscle problems (with high doses, especially if you have myasthenia gravis or similar nerve and muscle problems) · Do not stop using this medicine suddenly. Your doctor will need to slowly decrease your dose before you stop it completely. · This medicine could cause you to get infections more easily. Tell your doctor right away if you are exposed to chicken pox, measles, or other serious infection.  Tell your doctor if you had a serious infection in the past, such as tuberculosis or herpes. · Tell your doctor about any extra stress or anxiety in your life. Your dose might need to be changed for a short time. · Tell any doctor or dentist who treats you that you are using this medicine. This medicine may affect certain medical test results. · Keep all medicine out of the reach of children. Never share your medicine with anyone. Possible Side Effects While Using This Medicine:  
Call your doctor right away if you notice any of these side effects: · Allergic reaction: Itching or hives, swelling in your face or hands, swelling or tingling in your mouth or throat, chest tightness, trouble breathing · Dark freckles, skin color changes, coldness, weakness, tiredness, nausea, vomiting, weight loss · Depression, unusual thoughts, feelings, or behaviors, trouble sleeping · Fever, chills, cough, sore throat, and body aches · Muscle pain or weakness · Rapid weight gain, swelling in your hands, ankles, or feet · Severe stomach pain, nausea, vomiting, or red or black stools · Skin changes or growths · Trouble seeing, eye pain, headache If you notice these less serious side effects, talk with your doctor: · Increased appetite · Round, puffy face · Weight gain around your neck, upper back, breast, face, or waist 
If you notice other side effects that you think are caused by this medicine, tell your doctor. Call your doctor for medical advice about side effects. You may report side effects to FDA at 5-536-FDA-4332 © 2016 [address] is for End User's use only and may not be sold, redistributed or otherwise used for commercial purposes. The above information is an  only. It is not intended as medical advice for individual conditions or treatments. Talk to your doctor, nurse or pharmacist before following any medical regimen to see if it is safe and effective for you. Furosemide (By mouth) Furosemide (mtzn-QM-rm-mide) Treats fluid retention and high blood pressure. This medicine is a diuretic (water pill). Brand Name(s): Active-Medicated Specimen Collection Kit, Lasix, RX-Specimen Collection Kit, Urinx Medicated Specimen Collection Package There may be other brand names for this medicine. When This Medicine Should Not Be Used: This medicine is not right for everyone. Do not use it if you had an allergic reaction to furosemide or if you are not able to urinate. How to Use This Medicine:  
Liquid, Tablet · Take your medicine as directed. Your dose may need to be changed several times to find what works best for you. · Measure the oral liquid medicine with a marked measuring spoon, oral syringe, or medicine cup. · You may take this medicine with food if it upsets your stomach. · Missed dose: Take a dose as soon as you remember. If it is almost time for your next dose, wait until then and take a regular dose. Do not take extra medicine to make up for a missed dose. · Store the medicine in a closed container at room temperature, away from heat, moisture, and direct light. Drugs and Foods to Avoid: Ask your doctor or pharmacist before using any other medicine, including over-the-counter medicines, vitamins, and herbal products. · Some medicines can affect how furosemide works. Tell your doctor if you are also using cisplatin, cyclosporine, digoxin, ethacrynic acid, indomethacin, licorice, lithium, mecamylamine, methotrexate, or phenytoin. · Also tell your doctor if you are also using an adrenocorticotropic hormone (ACTH), a laxative, medicine to treat an infection, other medicine for high blood pressure, a steroid medicine (such as hydrocortisone, methylprednisolone, prednisone, prednisolone, dexamethasone), or an NSAID pain or arthritis medicine (such as aspirin, diclofenac, ibuprofen, naproxen).  
· If you also take sucralfate, allow at least 2 hours between the time you take furosemide and the time you take sucralfate. Warnings While Using This Medicine: · Tell your doctor if you are pregnant or breastfeeding, or if you have kidney disease, liver disease, anemia, diabetes, gout, hearing problems, low blood pressure, lupus, an enlarged prostate, trouble urinating, or an allergy to sulfa drugs. Tell your doctor if you drink alcohol. · This medicine may cause the following problems:  
¨ Hypokalemia (low potassium in your blood) ¨ Changes in blood sugar levels ¨ Hearing problems · Make sure any doctor or dentist who treats you knows that you are using this medicine. · This medicine could lower your blood pressure too much, especially when you first use it or if you are dehydrated. Stand or sit up slowly if you feel lightheaded or dizzy. · Drink plenty of fluids if you exercise, sweat more than usual, or have diarrhea or vomiting. · This medicine may make your skin more sensitive to sunlight. Wear sunscreen. Do not use sunlamps or tanning beds. · Your doctor will do lab tests at regular visits to check on the effects of this medicine. Keep all appointments. · Keep all medicine out of the reach of children. Never share your medicine with anyone. Possible Side Effects While Using This Medicine:  
Call your doctor right away if you notice any of these side effects: · Allergic reaction: Itching or hives, swelling in your face or hands, swelling or tingling in your mouth or throat, chest tightness, trouble breathing · Blistering, peeling, red skin rash · Chest pain, shortness of breath · Confusion, weakness, muscle twitching · Dry mouth, increased thirst, muscle cramps, nausea or vomiting, uneven heartbeat · Sudden and severe stomach pain, nausea, vomiting, fever, lightheadedness · Hearing loss, ringing in the ears · Lightheadedness, dizziness, fainting · Severe diarrhea · Unusual bleeding or bruising · Yellow skin or eyes If you notice these less serious side effects, talk with your doctor: · Loss of appetite, stomach cramps If you notice other side effects that you think are caused by this medicine, tell your doctor. Call your doctor for medical advice about side effects. You may report side effects to FDA at 1-828-HXH-4124 © 2016 [address] is for End User's use only and may not be sold, redistributed or otherwise used for commercial purposes. The above information is an  only. It is not intended as medical advice for individual conditions or treatments. Talk to your doctor, nurse or pharmacist before following any medical regimen to see if it is safe and effective for you. Albuterol (By breathing) Albuterol (al-BUE-ter-ol) Treats or prevents bronchospasm. Brand Name(s):AccuNeb, Novaplus Ventolin HFA, Proventil, Proventil HFA, ReliOn Ventolin HFA, Ventolin HFA There may be other brand names for this medicine. When This Medicine Should Not Be Used: This medicine is not right for everyone. Do not use it if you had an allergic reaction to albuterol or milk proteins. How to Use This Medicine:  
Aerosol, Powder Under Pressure, Solution · Your doctor will tell you how much medicine to use. Do not use more than directed. Ask your doctor or pharmacist if you have questions about how to use your inhaler, nebulizer, or medicine. · Solution:  
¨ You will use this medicine with an inhaler device called a nebulizer. The nebulizer turns the medicine into a fine mist that you breathe in through your mouth and to your lungs. Your caregiver will show you how to use your nebulizer. ¨ Store unopened vials of this medicine at room temperature, away from heat and direct light. Do not freeze. An open vial of medicine must be used right away. · Aerosol inhaler: ¨ Shake the inhaler well just before each use.  Avoid spraying this medicine into your eyes. Test spray in the air before using for the first time or if the inhaler has not been used for a while. ¨ If you are supposed to use more than one puff, wait 1 to 2 minutes before inhaling the second puff. Repeat these steps for the next puff, starting with shaking the inhaler. ¨ Clean the inhaler mouthpiece at least once a week with warm running water for 30 seconds. Let it air dry completely. ¨ Store the canister at room temperature, away from heat and direct light. Do not freeze. Do not keep this medicine inside a car where it could be exposed to extreme heat or cold. Do not poke holes in the canister or throw it into a fire, even if the canister is empty. · ProAir® Respiclick inhaler: ¨ Make sure the cap is closed. Do not open the cap unless you are going to use the medicine. ¨ Hold the inhaler upright. Open the cap fully until you hear a click. Your inhaler is now ready to use. ¨ Close the cap firmly over the mouthpiece after each use. ¨ Keep the inhaler clean and dry at all times. Do not wash or put any part of the inhaler in water. ¨ To clean the mouthpiece, wipe it gently with a dry cloth or tissue. ¨ Keep the medicine in the foil pouch until you are ready to use it. Store at room temperature, away from heat and direct light. Do not freeze. · Read and follow the patient instructions that come with this medicine. Talk to your doctor or pharmacist if you have any questions. · Missed dose: Take a dose as soon as you remember. If it is almost time for your next dose, wait until then and take a regular dose. Do not take extra medicine to make up for a missed dose. Drugs and Foods to Avoid: Ask your doctor or pharmacist before using any other medicine, including over-the-counter medicines, vitamins, and herbal products. · Some medicines can affect how albuterol works. Tell your doctor if you are using any of the following: ¨ Digoxin ¨ Beta-blocker ¨ Diuretic (water pill) ¨ Medicine for depression or an MAO inhibitor within the past 2 weeks Warnings While Using This Medicine: · Tell your doctor if you are pregnant or breastfeeding, or if you have kidney disease, diabetes, heart disease, heart rhythm problems, high blood pressure, overactive thyroid, or a history of seizures. · This medicine may cause the following problems:  
¨ Paradoxical bronchospasm (increased trouble breathing right after use) ¨ Low potassium levels in the blood · If you use a corticosteroid medicine to control your asthma, keep using it as instructed by your doctor. · Call your doctor if your symptoms do not improve or if they get worse. · If any of your asthma medicines do not seem to be working as well as usual, call your doctor right away. Do not change your doses or stop using your medicines without asking your doctor. · Your doctor will check your progress and the effects of this medicine at regular visits. Keep all appointments. · Keep all medicine out of the reach of children. Never share your medicine with anyone. Possible Side Effects While Using This Medicine:  
Call your doctor right away if you notice any of these side effects: · Allergic reaction: Itching or hives, swelling in your face or hands, swelling or tingling in your mouth or throat, chest tightness, trouble breathing · Dry mouth, increased thirst, muscle cramps, nausea, vomiting · Fast, pounding, or uneven heartbeat, chest pain · Lightheadedness, dizziness, or fainting · Trouble breathing, increased wheezing, cough, chest tightness If you notice these less serious side effects, talk with your doctor: · Cough, sore throat, runny or stuffy nose · Headache · Tremors, nervousness If you notice other side effects that you think are caused by this medicine, tell your doctor. Call your doctor for medical advice about side effects. You may report side effects to FDA at 1-352-YGD-6246 © 2016 [address] is for End User's use only and may not be sold, redistributed or otherwise used for commercial purposes. The above information is an  only. It is not intended as medical advice for individual conditions or treatments. Talk to your doctor, nurse or pharmacist before following any medical regimen to see if it is safe and effective for you. Pneumonia: Care Instructions Your Care Instructions Pneumonia is an infection of the lungs. Most cases are caused by infections from bacteria or viruses. Pneumonia may be mild or very severe. If it is caused by bacteria, you will be treated with antibiotics. It may take a few weeks to a few months to recover fully from pneumonia, depending on how sick you were and whether your overall health is good. Follow-up care is a key part of your treatment and safety. Be sure to make and go to all appointments, and call your doctor if you are having problems. Its also a good idea to know your test results and keep a list of the medicines you take. How can you care for yourself at home? · Take your antibiotics exactly as directed. Do not stop taking the medicine just because you are feeling better. You need to take the full course of antibiotics. · Take your medicines exactly as prescribed. Call your doctor if you think you are having a problem with your medicine. · Get plenty of rest and sleep. You may feel weak and tired for a while, but your energy level will improve with time. · To prevent dehydration, drink plenty of fluids, enough so that your urine is light yellow or clear like water. Choose water and other caffeine-free clear liquids until you feel better. If you have kidney, heart, or liver disease and have to limit fluids, talk with your doctor before you increase the amount of fluids you drink.  
· Take care of your cough so you can rest. A cough that brings up mucus from your lungs is common with pneumonia. It is one way your body gets rid of the infection. But if coughing keeps you from resting or causes severe fatigue and chest-wall pain, talk to your doctor. He or she may suggest that you take a medicine to reduce the cough. · Use a vaporizer or humidifier to add moisture to your bedroom. Follow the directions for cleaning the machine. · Do not smoke or allow others to smoke around you. Smoke will make your cough last longer. If you need help quitting, talk to your doctor about stop-smoking programs and medicines. These can increase your chances of quitting for good. · Take an over-the-counter pain medicine, such as acetaminophen (Tylenol), ibuprofen (Advil, Motrin), or naproxen (Aleve). Read and follow all instructions on the label. · Do not take two or more pain medicines at the same time unless the doctor told you to. Many pain medicines have acetaminophen, which is Tylenol. Too much acetaminophen (Tylenol) can be harmful. · If you were given a spirometer to measure how well your lungs are working, use it as instructed. This can help your doctor tell how your recovery is going. · To prevent pneumonia in the future, talk to your doctor about getting a flu vaccine (once a year) and a pneumococcal vaccine (one time only for most people). When should you call for help? Call 911 anytime you think you may need emergency care. For example, call if: 
· You have severe trouble breathing. Call your doctor now or seek immediate medical care if: 
· You cough up dark brown or bloody mucus (sputum). · You have new or worse trouble breathing. · You are dizzy or lightheaded, or you feel like you may faint. Watch closely for changes in your health, and be sure to contact your doctor if: 
· You have a new or higher fever. · You are coughing more deeply or more often. · You are not getting better after 2 days (48 hours). · You do not get better as expected. Where can you learn more? Go to http://manju-tamanna.info/. Enter [id number] in the search box to learn more about \"Pneumonia: Care Instructions. \" Current as of: May 23, 2016 Content Version: 11.1 © 3267-6778 [employer]. Care instructions adapted under license by [employer] (which disclaims liability or warranty for this information). If you have questions about a medical condition or this instruction, always ask your healthcare professional. [address] any warranty or liability for your use of this information.

## 2017-01-05 NOTE — ED PROVIDER NOTES
HPI Comments: 5:23 PM Annette Hood is a 70 y.o. female with h/o COPD who presents to ED complaining of SOB onset 3 or 4 days ago. Pt also admits to cough, subjective fever, and wheezing. Pt is a smoker for 30 years now. Pt has inhalers prescribed to be used BID. Pt is not on steroids. She does not see a pulmonologist. She denies EtOH and drug use. Pt was seen at Urgent Care and was told to come to [facility] ED because she had pneumonia. No other concerns or symptoms at this time. PCP: Shyam Arriola MD    The history is provided by the patient. Past Medical History:   Diagnosis Date    Chronic obstructive pulmonary disease ([facility] [address]        Past Surgical History:   Procedure Laterality Date    Hx gyn      Pr abdomen surgery proc unlisted           History reviewed. No pertinent family history. Social History     Social History    Marital status:      Spouse name: N/A    Number of children: N/A    Years of education: N/A     Occupational History    Not on file. Social History Main Topics    Smoking status: Current Every Day Smoker     Packs/day: 0.50    Smokeless tobacco: Not on file    Alcohol use 8.4 oz/week     14 Glasses of wine per week    Drug use: No    Sexual activity: Not on file     Other Topics Concern    Not on file     Social History Narrative    No narrative on file         ALLERGIES: Review of patient's allergies indicates no known allergies. Review of Systems   Constitutional: Positive for fever (subjective). Negative for chills. HENT: Negative for congestion, rhinorrhea and sore throat. Respiratory: Positive for cough, shortness of breath and wheezing. Cardiovascular: Negative for chest pain and leg swelling. Gastrointestinal: Negative for abdominal pain, diarrhea, nausea and vomiting. Genitourinary: Negative for dysuria and frequency. Musculoskeletal: Negative for arthralgias, back pain and myalgias. Skin: Negative for rash and wound. Neurological: Negative for dizziness, numbness and headaches. Vitals:    01/05/17 1704 01/05/17 1727 01/05/17 1729   BP:   97/54   Pulse:   (!) 113   Resp:   29   Temp:   98 °F (36.7 °C)   SpO2: 93%  93%   Weight:  54.4 kg (120 lb) 54.4 kg (120 lb)   Height:   5' 5\" (1.651 m)            Physical Exam   Constitutional: She is oriented to person, place, and time. She appears well-developed and well-nourished. No distress. Nasal cannula in place. HENT:   Head: Normocephalic and atraumatic. Mouth/Throat: Oropharynx is clear and moist.   Eyes: Conjunctivae and EOM are normal. Pupils are equal, round, and reactive to light. No scleral icterus. Neck: Normal range of motion. Neck supple. Cardiovascular: Regular rhythm and normal heart sounds. Tachycardia present. No murmur heard. Pulmonary/Chest: Effort normal. No respiratory distress. She has rhonchi. Abdominal: Soft. Bowel sounds are normal. She exhibits no distension. There is no tenderness. Musculoskeletal: She exhibits no edema. Lymphadenopathy:     She has no cervical adenopathy. Neurological: She is alert and oriented to person, place, and time. Coordination normal.   Skin: Skin is warm and dry. No rash noted. Psychiatric: She has a normal mood and affect. Her behavior is normal.   Nursing note and vitals reviewed.        MDM  Number of Diagnoses or Management Options  CAP (community acquired pneumonia):   Diagnosis management comments: Cough subjective fever chills mild dyspnea referred from urgent care for pneumonia noted on CXR below pt hypotensive tachycardic tachypnea on arrival     Ekg: sinus tach rate 114 no acute st changes     Discussed with Dr Yamil Mathis agrees with admission        Amount and/or Complexity of Data Reviewed  Clinical lab tests: ordered and reviewed  Tests in the radiology section of CPT®: ordered and reviewed  Independent visualization of images, tracings, or specimens: yes    Risk of Complications, Morbidity, and/or Mortality  Presenting problems: high  Diagnostic procedures: moderate  Management options: moderate      ED Course       Procedures    Vitals:  Patient Vitals for the past 12 hrs:   Temp Pulse Resp BP SpO2   01/05/17 1729 98 °F (36.7 °C) (!) 113 29 97/54 93 %   01/05/17 1704 - - - - 93 %         Medications ordered:   Medications   sodium chloride (NS) flush 5-10 mL (not administered)   cefTRIAXone (ROCEPHIN) 2 g in 0.9% sodium chloride (MBP/ADV) 50 mL MBP (not administered)   azithromycin (ZITHROMAX) 500 mg in 0.9% sodium chloride (MBP/ADV) 250 mL adv (not administered)   albuterol-ipratropium (DUO-NEB) 2.5 MG-0.5 MG/3 ML (not administered)   sodium chloride 0.9 % bolus infusion 1,632 mL (1,632 mL IntraVENous New Bag 1/5/17 1803)         Lab findings:  Recent Results (from the past 12 hour(s))   POC LACTIC ACID    Collection Time: 01/05/17  5:24 PM   Result Value Ref Range    Lactic Acid (POC) 2.3 (HH) 0.4 - 2.0 mmol/L   METABOLIC PANEL, COMPREHENSIVE    Collection Time: 01/05/17  5:32 PM   Result Value Ref Range    Sodium 136 136 - 145 mmol/L    Potassium 3.8 3.5 - 5.5 mmol/L    Chloride 98 (L) 100 - 108 mmol/L    CO2 26 21 - 32 mmol/L    Anion gap 12 3.0 - 18 mmol/L    Glucose 66 (L) 74 - 99 mg/dL    BUN 23 (H) 7.0 - 18 MG/DL    Creatinine 0.85 0.6 - 1.3 MG/DL    BUN/Creatinine ratio 27 (H) 12 - 20      GFR est AA >60 >60 ml/min/1.73m2    GFR est non-AA >60 >60 ml/min/1.73m2    Calcium 8.4 (L) 8.5 - 10.1 MG/DL    Bilirubin, total 1.2 (H) 0.2 - 1.0 MG/DL    ALT 23 13 - 56 U/L    AST 28 15 - 37 U/L    Alk.  phosphatase 96 45 - 117 U/L    Protein, total 5.9 (L) 6.4 - 8.2 g/dL    Albumin 2.9 (L) 3.4 - 5.0 g/dL    Globulin 3.0 2.0 - 4.0 g/dL    A-G Ratio 1.0 0.8 - 1.7     CBC WITH AUTOMATED DIFF    Collection Time: 01/05/17  5:32 PM   Result Value Ref Range    WBC 5.5 4.6 - 13.2 K/uL    RBC 3.95 (L) 4.20 - 5.30 M/uL    HGB 16.2 (H) 12.0 - 16.0 g/dL    HCT 45.4 (H) 35.0 - 45.0 %    .9 (H) 74.0 - 97.0 FL MCH 41.0 (H) 24.0 - 34.0 PG    MCHC 35.7 31.0 - 37.0 g/dL    RDW 12.8 11.6 - 14.5 %    PLATELET 309 426 - 668 K/uL    MPV 10.2 9.2 - 11.8 FL    NEUTROPHILS 41 (L) 42 - 75 %    BANDS 46 (H) 0 - 5 %    LYMPHOCYTES 10 (L) 20 - 51 %    MONOCYTES 3 2 - 9 %    EOSINOPHILS 0 0 - 5 %    BASOPHILS 0 0 - 3 %    ABS. NEUTROPHILS 2.3 1.8 - 8.0 K/UL    ABS. LYMPHOCYTES 0.6 (L) 0.8 - 3.5 K/UL    ABS. MONOCYTES 0.2 0 - 1.0 K/UL    ABS. EOSINOPHILS 0.0 0.0 - 0.4 K/UL    ABS.  BASOPHILS 0.0 0.0 - 0.1 K/UL    PLATELET COMMENTS ADEQUATE PLATELETS      RBC COMMENTS MACROCYTOSIS  2+        DF MANUAL     EKG, 12 LEAD, INITIAL    Collection Time: 01/05/17  5:41 PM   Result Value Ref Range    Ventricular Rate 114 BPM    Atrial Rate 114 BPM    P-R Interval 124 ms    QRS Duration 88 ms    Q-T Interval 350 ms    QTC Calculation (Bezet) 482 ms    Calculated P Axis 73 degrees    Calculated R Axis 68 degrees    Calculated T Axis 60 degrees    Diagnosis       Sinus tachycardia  Possible Left atrial enlargement  RSR' or QR pattern in V1 suggests right ventricular conduction delay  Borderline ECG  No previous ECGs available         EKG interpretation by ED Physician:       X-Ray, CT or other radiology findings or impressions:  Chest x-ray taken at Urgent Care just before ED arrival:          Orders:  Orders Placed This Encounter    [facility] ED (REQUIRED)     Standing Status:   Standing     Number of Occurrences:   1    CULTURE, BLOOD     Standing Status:   Standing     Number of Occurrences:   1    CULTURE, BLOOD     Standing Status:   Standing     Number of Occurrences:   1    XR CHEST PORT     Standing Status:   Standing     Number of Occurrences:   1     Order Specific Question:   Reason for Exam     Answer:   Sepsis    LACTIC ACID, PLASMA     Standing Status:   Standing     Number of Occurrences:   1    URINALYSIS W/ RFLX MICROSCOPIC     Standing Status:   Standing     Number of Occurrences:   1    METABOLIC PANEL, COMPREHENSIVE     Standing Status:   Standing     Number of Occurrences:   1    CBC WITH AUTOMATED DIFF     Standing Status:   Standing     Number of Occurrences:   1    LACTIC ACID, PLASMA     Standing Status:   Standing     Number of Occurrences:   1    POC LACTIC ACID     Standing Status:   Standing     Number of Occurrences:   1    VITAL SIGNS - PER UNIT ROUTINE     PER UNIT ROUTINE     Standing Status:   Standing     Number of Occurrences:   1    STRICT I & O     Standing Status:   Standing     Number of Occurrences:   1    NEUROLOGIC STATUS ASSESSMENT - PER UNIT ROUTINE     PER UNIT ROUTINE     Standing Status:   Standing     Number of Occurrences:   1    POC LACTIC ACID     Standing Status:   Standing     Number of Occurrences:   1    EKG, 12 LEAD, INITIAL     Standing Status:   Standing     Number of Occurrences:   1     Order Specific Question:   Reason for Exam:     Answer:   tachy    EKG, 12 LEAD, INITIAL     Standing Status:   Standing     Number of Occurrences:   1     Order Specific Question:   Reason for Exam:     Answer:   Sepsis    SALINE LOCK IV ONE TIME STAT     Standing Status:   Standing     Number of Occurrences:   1    sodium chloride (NS) flush 5-10 mL    sodium chloride 0.9 % bolus infusion 1,632 mL    cefTRIAXone (ROCEPHIN) 2 g in 0.9% sodium chloride (MBP/ADV) 50 mL MBP     First dose Stat     Order Specific Question:   Antibiotic Indications     Answer:   Pneumonia (CAP)    azithromycin (ZITHROMAX) 500 mg in 0.9% sodium chloride (MBP/ADV) 250 mL adv     Order Specific Question:   Antibiotic Indications     Answer:   Pneumonia (CAP)    albuterol-ipratropium (DUO-NEB) 2.5 MG-0.5 MG/3 ML     Order Specific Question:   MODE OF DELIVERY     Answer:   Nebulizer    budesonide-formoterol (SYMBICORT) 160-4.5 mcg/actuation HFA inhaler     Sig: Take 2 Puffs by inhalation two (2) times a day. Progress notes, Consult notes, or additional Procedure notes:   7:16 PM   PM Consult:  I discussed care with Dr. Halle Angela (hospitalist). It was a standard discussion including patient history, chief complaint, available diagnostic results, and predicted treatment course. Disposition:  Diagnosis:   1. CAP (community acquired pneumonia)        Disposition: Admit    Follow-up Information     None           Patient's Medications   Start Taking    No medications on file   Continue Taking    BUDESONIDE-FORMOTEROL (SYMBICORT) 160-4.5 MCG/ACTUATION HFA INHALER    Take 2 Puffs by inhalation two (2) times a day. These Medications have changed    No medications on file   Stop Taking    No medications on file     [id number] [facility] for and in the presence of Scotty Andersen MD (01/05/17)      Physician Attestation    I personally performed the services described in this documentation, reviewed and edited the documentation which was dictated to the scribe in my presence, and it accurately records my words and actions.     Scotty Andersen MD (01/05/17)      Signed by: Hugh Kevin, 01/05/17, 6:18 PM

## 2017-01-05 NOTE — ED TRIAGE NOTES
The Sepsis Screening has been completed on arrival in the Emergency Department. Vital signs:  Patient Vitals for the past 4 hrs:   Temp Pulse Resp BP SpO2   01/05/17 1729 98 °F (36.7 °C) (!) 113 29 97/54 93 %            =monitored (data validate)  MAP (Calculated): 68    =calculated (manual entry)    Patient meets 2 or more SIRS criteria with suspected infection? YES    IF ANSWER IS YES, INITIATE NURSE DRIVEN SEPSIS ORDERS OR REQUEST SEPSIS BUNDLE ORDER BY PROVIDER.      SIRS Criteria is achieved when two or more of the following are present:    Temperature < 96.8°F (36°C) or > 100.9°F (38.3°C)   Heart Rate > 90 beats per minute   Respiratory Rate > 20 beats per minute   WBC count > 12,000 or <4,000 or > 10% bands

## 2017-01-06 LAB
ANION GAP BLD CALC-SCNC: 11 MMOL/L (ref 3–18)
BASOPHILS # BLD AUTO: 0 K/UL (ref 0–0.1)
BASOPHILS # BLD: 0 % (ref 0–3)
BILIRUB DIRECT SERPL-MCNC: 0.3 MG/DL (ref 0–0.2)
BUN SERPL-MCNC: 25 MG/DL (ref 7–18)
BUN/CREAT SERPL: 39 (ref 12–20)
CALCIUM SERPL-MCNC: 7.3 MG/DL (ref 8.5–10.1)
CHLORIDE SERPL-SCNC: 104 MMOL/L (ref 100–108)
CO2 SERPL-SCNC: 24 MMOL/L (ref 21–32)
CREAT SERPL-MCNC: 0.64 MG/DL (ref 0.6–1.3)
DIFFERENTIAL METHOD BLD: ABNORMAL
EOSINOPHIL # BLD: 0 K/UL (ref 0–0.4)
EOSINOPHIL NFR BLD: 0 % (ref 0–5)
ERYTHROCYTE [DISTWIDTH] IN BLOOD BY AUTOMATED COUNT: 13 % (ref 11.6–14.5)
GLUCOSE SERPL-MCNC: 101 MG/DL (ref 74–99)
HCT VFR BLD AUTO: 36.2 % (ref 35–45)
HGB BLD-MCNC: 12.5 G/DL (ref 12–16)
LYMPHOCYTES # BLD AUTO: 6 % (ref 20–51)
LYMPHOCYTES # BLD: 0.4 K/UL (ref 0.8–3.5)
MCH RBC QN AUTO: 39.9 PG (ref 24–34)
MCHC RBC AUTO-ENTMCNC: 34.5 G/DL (ref 31–37)
MCV RBC AUTO: 115.7 FL (ref 74–97)
METAMYELOCYTES NFR BLD MANUAL: 2 %
MONOCYTES # BLD: 0 K/UL (ref 0–1)
MONOCYTES NFR BLD AUTO: 0 % (ref 2–9)
MYELOCYTES NFR BLD MANUAL: 1 %
NEUTS BAND NFR BLD MANUAL: 5 % (ref 0–5)
NEUTS SEG # BLD: 5.7 K/UL (ref 1.8–8)
NEUTS SEG NFR BLD AUTO: 86 % (ref 42–75)
PLATELET # BLD AUTO: 153 K/UL (ref 135–420)
PMV BLD AUTO: 10.2 FL (ref 9.2–11.8)
POTASSIUM SERPL-SCNC: 3.6 MMOL/L (ref 3.5–5.5)
RBC # BLD AUTO: 3.13 M/UL (ref 4.2–5.3)
RBC MORPH BLD: ABNORMAL
RBC MORPH BLD: ABNORMAL
SODIUM SERPL-SCNC: 139 MMOL/L (ref 136–145)
WBC # BLD AUTO: 6.6 K/UL (ref 4.6–13.2)

## 2017-01-06 PROCEDURE — 74011250637 HC RX REV CODE- 250/637: Performed by: FAMILY MEDICINE

## 2017-01-06 PROCEDURE — 77010033678 HC OXYGEN DAILY

## 2017-01-06 PROCEDURE — 74011250636 HC RX REV CODE- 250/636: Performed by: EMERGENCY MEDICINE

## 2017-01-06 PROCEDURE — 36415 COLL VENOUS BLD VENIPUNCTURE: CPT | Performed by: FAMILY MEDICINE

## 2017-01-06 PROCEDURE — 94640 AIRWAY INHALATION TREATMENT: CPT

## 2017-01-06 PROCEDURE — 80048 BASIC METABOLIC PNL TOTAL CA: CPT | Performed by: FAMILY MEDICINE

## 2017-01-06 PROCEDURE — 74011000258 HC RX REV CODE- 258: Performed by: EMERGENCY MEDICINE

## 2017-01-06 PROCEDURE — 85025 COMPLETE CBC W/AUTO DIFF WBC: CPT | Performed by: FAMILY MEDICINE

## 2017-01-06 PROCEDURE — 74011000250 HC RX REV CODE- 250: Performed by: FAMILY MEDICINE

## 2017-01-06 PROCEDURE — 77030020263 HC SOL INJ SOD CL0.9% LFCR 1000ML

## 2017-01-06 PROCEDURE — 82248 BILIRUBIN DIRECT: CPT | Performed by: FAMILY MEDICINE

## 2017-01-06 PROCEDURE — 92610 EVALUATE SWALLOWING FUNCTION: CPT

## 2017-01-06 PROCEDURE — 74011250636 HC RX REV CODE- 250/636: Performed by: FAMILY MEDICINE

## 2017-01-06 PROCEDURE — 65660000000 HC RM CCU STEPDOWN

## 2017-01-06 RX ORDER — MAG HYDROX/ALUMINUM HYD/SIMETH 200-200-20
30 SUSPENSION, ORAL (FINAL DOSE FORM) ORAL
Status: DISCONTINUED | OUTPATIENT
Start: 2017-01-06 | End: 2017-01-17 | Stop reason: HOSPADM

## 2017-01-06 RX ORDER — IPRATROPIUM BROMIDE AND ALBUTEROL SULFATE 2.5; .5 MG/3ML; MG/3ML
SOLUTION RESPIRATORY (INHALATION)
Status: DISPENSED
Start: 2017-01-06 | End: 2017-01-06

## 2017-01-06 RX ADMIN — METHYLPREDNISOLONE SODIUM SUCCINATE 40 MG: 40 INJECTION, POWDER, FOR SOLUTION INTRAMUSCULAR; INTRAVENOUS at 03:01

## 2017-01-06 RX ADMIN — BUDESONIDE 500 MCG: 0.5 SUSPENSION RESPIRATORY (INHALATION) at 09:31

## 2017-01-06 RX ADMIN — ARFORMOTEROL TARTRATE 15 MCG: 15 SOLUTION RESPIRATORY (INHALATION) at 09:31

## 2017-01-06 RX ADMIN — CEFTRIAXONE 2 G: 2 INJECTION, POWDER, FOR SOLUTION INTRAMUSCULAR; INTRAVENOUS at 18:12

## 2017-01-06 RX ADMIN — ACETAMINOPHEN 650 MG: 325 TABLET, FILM COATED ORAL at 15:35

## 2017-01-06 RX ADMIN — SODIUM CHLORIDE 125 ML/HR: 900 INJECTION, SOLUTION INTRAVENOUS at 22:03

## 2017-01-06 RX ADMIN — ENOXAPARIN SODIUM 40 MG: 40 INJECTION SUBCUTANEOUS at 21:17

## 2017-01-06 RX ADMIN — IPRATROPIUM BROMIDE AND ALBUTEROL SULFATE 3 ML: .5; 3 SOLUTION RESPIRATORY (INHALATION) at 09:32

## 2017-01-06 RX ADMIN — ARFORMOTEROL TARTRATE 15 MCG: 15 SOLUTION RESPIRATORY (INHALATION) at 20:16

## 2017-01-06 RX ADMIN — METHYLPREDNISOLONE SODIUM SUCCINATE 40 MG: 40 INJECTION, POWDER, FOR SOLUTION INTRAMUSCULAR; INTRAVENOUS at 12:35

## 2017-01-06 RX ADMIN — SODIUM CHLORIDE 125 ML/HR: 900 INJECTION, SOLUTION INTRAVENOUS at 12:36

## 2017-01-06 RX ADMIN — IPRATROPIUM BROMIDE AND ALBUTEROL SULFATE 3 ML: .5; 3 SOLUTION RESPIRATORY (INHALATION) at 02:15

## 2017-01-06 RX ADMIN — SODIUM CHLORIDE 500 MG: 900 INJECTION, SOLUTION INTRAVENOUS at 19:34

## 2017-01-06 RX ADMIN — BUDESONIDE 500 MCG: 0.5 SUSPENSION RESPIRATORY (INHALATION) at 20:16

## 2017-01-06 RX ADMIN — METHYLPREDNISOLONE SODIUM SUCCINATE 40 MG: 40 INJECTION, POWDER, FOR SOLUTION INTRAMUSCULAR; INTRAVENOUS at 18:12

## 2017-01-06 RX ADMIN — IPRATROPIUM BROMIDE AND ALBUTEROL SULFATE 3 ML: .5; 3 SOLUTION RESPIRATORY (INHALATION) at 20:16

## 2017-01-06 NOTE — PROGRESS NOTES
Problem: Dysphagia (Adult)  Goal: *Acute Goals and Plan of Care (Insert Text)  Dysphagia Present: No    Recommendations:  Diet: regular  Meds: per pt preference  Aspiration Precautions  Other: GERD precautions     Patient will:  1. Participate in training and education related to continued aspiration risk, diet recs and compensatory strategies (goal met). Outcome: Resolved/Met Date Met:  01/06/17  SPEECH LANGUAGE PATHOLOGY BEDSIDE SWALLOW EVALUATION AND DISCHARGE     Patient: Dona Gomez (75 y.o. female)  Date: 1/6/2017  Primary Diagnosis: Sepsis ([address]  Pneumonia        Precautions: aspiration         ASSESSMENT :  Clinical beside swallow eval completed per MD orders. Pt A&Ox4. Functional communication. Intelligibility >90%. Cognitive-linguistic function appears intact. Spouse at b/s. Pt reports GERD; medically managed secondary to occasional feelings of globus. OM examination revealed oral motor structures functional for mastication and deglutition. Presented with thin liquid, puree, and solid trials. Exhibited (+) bolus cohesion, manipulation and A-P transit. Further exhibited (+) swallow timing/reflex and hyolaryngeal excursion. Pt able to manipulate and clear with 0 clinical s/s aspiration and/or oropharyngeal dysphagia. Pt safe for regular solid, thin liquid diet. 0 formal ST needs for dysphagia indicated at this time. SLP educated pt on role of speech therapist in current setting with re: speech/swallow; verbalized comprehension. SLP available for re-evaluation if indicated by MD.     *Pt/spouse educated on GERD precautions (6 mayra meals/day; upright >45 minutes after meals; walk after meals; sleeping on wedge/multiple pillows. Both parties verbalized comprehension. Thank you for this referral.   Kendrick Adorno, SLP       PLAN :  Recommendations and Planned Interventions:  No formal ST needs ID'd for dysphagia. Eval only.    Discharge Recommendations: None       SUBJECTIVE:   Patient stated thank you, I'll try to do those things. OBJECTIVE:       Past Medical History   Diagnosis Date    Chronic obstructive pulmonary disease ([address]       Past Surgical History   Procedure Laterality Date    Hx gyn        Pr abdomen surgery proc unlisted         Prior Level of Function/Home Situation: lives with spouse  Home Situation  Home Environment: Private residence  One/Two Story Residence: Two story  Living Alone: No  Support Systems: Family member(s)  Patient Expects to be Discharged to[de-identified] Private residence  Current DME Used/Available at Home: None  Diet prior to admission: regular  Current Diet:  regular   Cognitive and Communication Status:  Neurologic State: Alert  Orientation Level: Oriented X4  Cognition: Appropriate for age attention/concentration  Perception: Appears intact  Perseveration: No perseveration noted  Safety/Judgement: Awareness of environment  Oral Assessment:  Oral Assessment  Labial: No impairment  Dentition: Intact  Oral Hygiene: good  Lingual: No impairment  Velum: No impairment  Mandible: No impairment  P.O. Trials:  Patient Position: [facility]  Vocal quality prior to P.O.: No impairment  Consistency Presented: Thin liquid; Solid  How Presented: Self-fed/presented; Successive swallows;Straw     Bolus Acceptance: No impairment  Bolus Formation/Control: No impairment     Propulsion: No impairment  Oral Residue: None  Initiation of Swallow: No impairment  Laryngeal Elevation: Functional  Aspiration Signs/Symptoms: None  Pharyngeal Phase Characteristics: No impairment, issues, or problems      Cues for Modifications: None        Oral Phase Severity: No impairment  Pharyngeal Phase Severity : No impairment     GCODESwallowing:  Swallow Current Status CH= 0%   Swallow Goal Status CH= 0%   Swallow D/C Status CH= 0%     The severity rating is based on the following outcomes:  PEEWEE Noms Swallow Level 7              Clinical Judgement        PAIN:  Start of Eval: 0  End of Eval: 0 After evaluation:   [ ]            Patient left in no apparent distress sitting up in chair  [X]            Patient left in no apparent distress in bed  [X]            Call bell left within reach  [X]            Nursing notified  [X]            Family present  [ ]            Caregiver present  [ ]            Bed alarm activated         COMMUNICATION/EDUCATION:   [X]            Aspiration precautions; swallow safety; compensatory techniques. [X]            Patient/family have participated as able in goal setting and plan of care. [ ]            Patient/family agree to work toward stated goals and plan of care. [ ]            Patient understands intent and goals of therapy; neutral about participation. [ ]            Patient unable to participate in goal setting/plan of care; educ ongoing with interdisciplinary staff  [ ]             Posted safety precautions in patient's room.      Thank you for this referral.  Tabitha Lloyd, SLP  Time Calculation: 17 mins

## 2017-01-06 NOTE — PROGRESS NOTES
2050 Received Patient from ED via bed. Report received from [employer]. Patient oriented to room, and in bed. Bed low and locked. Call bell within reach. Patient offered no complains. 2055 Admission assessment completed. Telemetry verified, reading sinus tachycardia. Will continue to monitor. 2325 Removed patient's IV on right AC due to swelling. Patient tolerated procedure well. 0045 Reassessment completed. Changes noted above. 0400 Reassessment completed. No changes to previous assessment. 0745 Bedside and Verbal shift change report given to MARTA Blair RN (oncoming nurse) by Galilea Guy RN and Charles Nguyen RN (offgoing nurse). Report included the following information SBAR, Kardex, MAR and Recent Results.

## 2017-01-06 NOTE — PROGRESS NOTES
Rec'd pt on 3 lpm NC  -pt awake & alert-no resp distress reported or observed  -administer Neb  -no adverse reaction observed

## 2017-01-06 NOTE — H&P
History and Physical    Patient: Julio Vines               Sex: female          DOA: 1/5/2017       YOB: 1945      Age:  70 y.o.        LOS:  LOS: 0 days        Chief Complaint   Patient presents with    Chest Pain         HPI:     Julio Vines is a 70 y.o. female who is admitted for pneumonia. Patient presentedin ED as a transfer from Urgent care with c/o chest pain . Patient reports the pain is in the left sub sternum with radiation to her back. She reports that it has become progressively worse and pleuritic. She also c/o productive cough chills, wheezing and sob. She has hx copd and is a smoker. She had a CXR done out patient which shows RLL opacity. She also had a EKG done non diagnostic and Troponin wnl. She was started on Rocephin and Zithromax and IVF per sepsis protocol. Patient also received duoneb. Admit for further management. Past Medical History   Diagnosis Date    Chronic obstructive pulmonary disease ([facility] [address]         Past Surgical History   Procedure Laterality Date    Hx gyn      Pr abdomen surgery proc unlisted         No current facility-administered medications on file prior to encounter. No current outpatient prescriptions on file prior to encounter. Social History:   Tobacco use: smokes 1/2 ppd   Alcohol use: drinks wine every other day    Occupation: retired  Patient is     Family History: none      Review of Systems    Constitutional:  No fever or weight loss, + fatigue, + appetite change  HEENT:  No headache or visual changes  Cardiovascular:  + chest pain ( Left chest wall) or diaphoresis  Respiratory:  + coughing, + wheezing, + shortness of breath. GI:  + nausea or vomiting.   No diarrhea  :  No hematuria or dysuria  Skin:  No rashes or moles  Neuro:  No seizures or syncope  Hematological:  No bruising or bleeding  Endocrine:  No diabetes or thyroid disease        Physical Exam:      Visit Vitals    BP 97/54 (BP 1 Location: Left arm, BP Patient Position: At rest)    Pulse (!) 113    Temp 98 °F (36.7 °C)    Resp 29    Ht 5' 5\" (1.651 m)    Wt 54.4 kg (120 lb)    SpO2 93%    BMI 19.97 kg/m2       Physical Exam:    Gen:  No distress, alert, ill looking , oriented to place, person and time   HEENT:  Normal cephalic atraumatic, extra-occular movements are intact. Non icteric sclera   Neck:  Supple, No JVD  Lungs:  Clear bilaterally, no wheeze, no rales, normal effort  Heart:  Tachycardia,  Regular Rhythm, normal S1 and S2, no edema  Abdomen:  Soft, non tender, normal bowel sounds, no guarding. Extremities:  Well perfused, no cyanosis or edema  Neurological:  Awake and alert, CN's are intact, normal strength throughout extremities  Skin:  No rashes or moles, capp refill < 3 sec  Mental Status:  Normal thought process, does not appear anxious    Laboratory Studies:    Recent Results (from the past 24 hour(s))   POC LACTIC ACID    Collection Time: 01/05/17  5:24 PM   Result Value Ref Range    Lactic Acid (POC) 2.3 (HH) 0.4 - 2.0 mmol/L   METABOLIC PANEL, COMPREHENSIVE    Collection Time: 01/05/17  5:32 PM   Result Value Ref Range    Sodium 136 136 - 145 mmol/L    Potassium 3.8 3.5 - 5.5 mmol/L    Chloride 98 (L) 100 - 108 mmol/L    CO2 26 21 - 32 mmol/L    Anion gap 12 3.0 - 18 mmol/L    Glucose 66 (L) 74 - 99 mg/dL    BUN 23 (H) 7.0 - 18 MG/DL    Creatinine 0.85 0.6 - 1.3 MG/DL    BUN/Creatinine ratio 27 (H) 12 - 20      GFR est AA >60 >60 ml/min/1.73m2    GFR est non-AA >60 >60 ml/min/1.73m2    Calcium 8.4 (L) 8.5 - 10.1 MG/DL    Bilirubin, total 1.2 (H) 0.2 - 1.0 MG/DL    ALT 23 13 - 56 U/L    AST 28 15 - 37 U/L    Alk.  phosphatase 96 45 - 117 U/L    Protein, total 5.9 (L) 6.4 - 8.2 g/dL    Albumin 2.9 (L) 3.4 - 5.0 g/dL    Globulin 3.0 2.0 - 4.0 g/dL    A-G Ratio 1.0 0.8 - 1.7     CBC WITH AUTOMATED DIFF    Collection Time: 01/05/17  5:32 PM   Result Value Ref Range    WBC 5.5 4.6 - 13.2 K/uL    RBC 3.95 (L) 4.20 - 5.30 M/uL    HGB 16.2 (H) 12.0 - 16.0 g/dL    HCT 45.4 (H) 35.0 - 45.0 %    .9 (H) 74.0 - 97.0 FL    MCH 41.0 (H) 24.0 - 34.0 PG    MCHC 35.7 31.0 - 37.0 g/dL    RDW 12.8 11.6 - 14.5 %    PLATELET 917 392 - 783 K/uL    MPV 10.2 9.2 - 11.8 FL    NEUTROPHILS 41 (L) 42 - 75 %    BANDS 46 (H) 0 - 5 %    LYMPHOCYTES 10 (L) 20 - 51 %    MONOCYTES 3 2 - 9 %    EOSINOPHILS 0 0 - 5 %    BASOPHILS 0 0 - 3 %    ABS. NEUTROPHILS 2.3 1.8 - 8.0 K/UL    ABS. LYMPHOCYTES 0.6 (L) 0.8 - 3.5 K/UL    ABS. MONOCYTES 0.2 0 - 1.0 K/UL    ABS. EOSINOPHILS 0.0 0.0 - 0.4 K/UL    ABS.  BASOPHILS 0.0 0.0 - 0.1 K/UL    PLATELET COMMENTS ADEQUATE PLATELETS      RBC COMMENTS MACROCYTOSIS  2+        DF MANUAL     CARDIAC PANEL,(CK, CKMB & TROPONIN)    Collection Time: 01/05/17  5:32 PM   Result Value Ref Range     (H) 26 - 192 U/L    CK - MB 1.6 0.5 - 3.6 ng/ml    CK-MB Index 0.8 0.0 - 4.0 %    Troponin-I, Qt. <0.02 0.0 - 0.045 NG/ML   EKG, 12 LEAD, INITIAL    Collection Time: 01/05/17  5:41 PM   Result Value Ref Range    Ventricular Rate 114 BPM    Atrial Rate 114 BPM    P-R Interval 124 ms    QRS Duration 88 ms    Q-T Interval 350 ms    QTC Calculation (Bezet) 482 ms    Calculated P Axis 73 degrees    Calculated R Axis 68 degrees    Calculated T Axis 60 degrees    Diagnosis       Sinus tachycardia  Possible Left atrial enlargement  RSR' or QR pattern in V1 suggests right ventricular conduction delay  Borderline ECG  No previous ECGs available         Assessment/Plan     Active Problems:    Pneumonia (1/5/2017)      Sepsis ([facility] [address] (1/5/2017)      Chest Pain   Lactic acidosis   Copd  Smoker   Sinus Tachycardia    PLAN:    Pneumonia   - CAP   - Continue Zithromax and Rocephin  - Supplementary O2 as needed  - continuous pulse ox    Sepsis  - 2/2 above  - Blood culture ordered  - Monitor UOP and blood pressure     Chest Pain   - likely 2/2 pneumonia  - EKG and Troponin reviewed  - Pain control as needed  - monitor     Lactic acidosis  - 2/2 sepsis  - trend every 4 until < 2   - IVF     Copd Excerebration  - duoneb and solumedrol   - On Symbicort     Smoker  - advised to quit    Sinus Tachycardia   - 2/2 sepsis   - On tele     DVT Prophylaxis - Lovenox    GI Prophylaxis - Protonix    Full code

## 2017-01-06 NOTE — MED STUDENT NOTES
*ATTENTION:  This note has been created by a medical student for educational purposes only. Please do not refer to the content of this note for clinical decision-making, billing, or other purposes. Please see attending physicians note to obtain clinical information on this patient. *       Student Progress Note  Please refer to attendings daily rounding note for full details      Patient: David Diego MRN: [id number]  CSN: [id number]    YOB: 1945  Age: 70 y.o. Sex: female    DOA: 1/5/2017 LOS:  LOS: 1 day          CC:  Cough, chest pain, shortness of breath, subjective fever and wheezing x 3-4 days, transferred from urgent care with dx of pneumonia    Assessment/ Plan:         A/P:  CA pneumonia   - CXR showing RLL consolidation   - Start zithromax and rocephin   - O2 via NC, SpO2 monitoring   - Scheduled DuoNeb treatments  Sepsis   - SIRS criteria met for: tachycardia, tachypnea, bandemia   - Likely 2/2 pneumonia   - Initiated sepsis protocol   - Ordered blood cultures, still pending neg after 14 hours   - Follow lactic acid level, decreased from 2.3 to 1.7 overnight   - Cardiac monitor heart rate   - Monitor blood pressure  Chest pain   - Troponin and EKG non concerning for acute process   - Likely 2/2 pneumonia   - Control pain with morphine prn  COPD   - Treat acute exacerbation with Brovana, Solumedrol and Pulmicort  Smoker   - Advise cessation    Lovenox for DVT prophylaxis  Protonix for GI prophylaxis    HPI:     David Diego is a 70 y.o. female with a hx of COPD and tobacco use presented to urgent care on 1/5 with c/o cough, SOB, wheezing, subjective fever and chest pain x 3-4 days. CXR showed RLL opacity concerning for pneumonia and pt was transferred to ED via EMS for further workup/treatment. On arrival, pt noted to have pleuritic chest pain to right chest wall radiating to her back; SOB on 4L O2, productive cough and malaise.   Symptoms have been getting progressively worse over past few days. Troponin and EKG non-concerning for acute cardiac event. Pt met SIRS criteria for tachycardia, tachypnea and bandemia. Sepsis protocol was initiated in ED and admitted to tele for further evaluation and treatment of CA pneumonia. Since admission, pt reports decrease in SOB, decreasing productive cough with clear secretions. Chest wall pain now 5 out of 10, improved from 10 out of 10 on admission. Medical History:        Past Medical History  COPD treated only with Spiriva inhaler. Surgical History  Unspecified abdominal surgery and gyn surgery    Family History  Non-contributory    Social History  Smoker - 1/2 ppd x 30 years  ETOH use - pt reports 2-3 glasses of wine approx every 3 days    Home medications  Spiriva  Vit D3  Vit B1    Allergies  NKDA    Review of Systems:      Gen:  No fever, + fatigue, improving  HEENT: + headache this morning, now resolved; Neg for congestion, PND, ear or throat pain   Cardiovascular: + chest pain to right chest wall, increases with cough, movement; No palpitations or diaphoresis  Pulm: + cough with clear sputum, shortness of breath, wheezing  GI: Neg for abd pain, n/v, diarrhea, constipation, hematochezia  : Neg for hematuria, dysuria   Musculoskeletal: + chest wall pain; otherwise negative  Skin: No rashes, moles or unusual bruising  Neuro: Neg for lightheadedness or dizziness; no neuro deficit    Physical Exam:      Visit Vitals    BP 99/62 (BP 1 Location: Right arm, BP Patient Position: At rest)    Pulse 99    Temp 98.1 °F (36.7 °C)    Resp 18    Ht 5' 5\" (1.651 m)    Wt 54.4 kg (120 lb)    SpO2 91%    BMI 19.97 kg/m2         Physical Exam:  Gen: A&O x 4, cooperative in NAD.   HEENT: No congestion, sclerae non-icteric, mucous membranes moist;  Neck supple without lymphadenopathy or tracheal deviation   CV:  Right chest wall tender to palpation from R lower sternal border to back; RRR with no murmur noted, peripheral pulses present, equal; no edema   Resp: Nasal cannula in place at 4L O2; Slight expiratory wheeze on right, ronchi throughout left; Breathing non-labored with no accessory muscle use; no cyanosis or clubbing   Abd: Abd soft, non-tender, no distension or guarding, normal bowel sounds  Extrem:  No cyanosis, clubbing or edema  Skin: No rashes, moles or lesions  Neuro: Awake and alert, no weakness in extremities, no neuro deficits noted      I have reviewed the patient's Labs and Radiology studies. ALEX Stark.  Heike Mcrae  1/6/2017  9:59 AM

## 2017-01-06 NOTE — PROGRESS NOTES
**P&T Committee has authorized the Automatic Substitution of  Symbicort 160-4.5 mcg / actuation Oral Inhaler to Budesonide and Arformoterol nebulizer solution. **  **If the use of an inhaler device is of medical necessity, please indicate Do Not Substitute or Dispense As Written below. **  **[facility] will administer all doses of nebulizer solution in this facility. **

## 2017-01-06 NOTE — ED NOTES
Bedside shift report completed with Genesis Griffith RN  Safety measures were reviewed  Lines traced/IV pump and IV site double checked  Activity level, PO status and vital sign trends reviewed   Monitor alarm limits on and set per order/protocol   Patient and or family participated in handoff   Clinical quality measures reviewed

## 2017-01-06 NOTE — ED NOTES
SEPSIS SUMMARY    · Blood Cultures Drawn: YES  · Time Initial Lactic Drawn:         1724     · POC Lactic Acid Result: 2.3   · Time Next Lactic Acid Due: 2124   · Repeat POC Lactic Acid drawn:  NO   · POC Lactic Acid Repeat Result:  · Antibiotics started within 3 hours of arrival? YES  · Target fluid bolus 30ml/kg initiated? YES    Vital signs:  Patient Vitals for the past 4 hrs:   Temp Pulse Resp BP SpO2   01/05/17 1729 98 °F (36.7 °C) (!) 113 29 97/54 93 %   01/05/17 1704 - - - - 93 %            =monitored (data validate)  MAP (Calculated): 68    =calculated (manual entry)      Additional Labs:    WBC:    Lab Results   Component Value Date/Time    WBC 5.5 01/05/2017 05:32 PM     Bilirubin:    Lab Results   Component Value Date/Time    Bilirubin, total 1.2 01/05/2017 05:32 PM     INR:  No results found for: INR, PTMR, PTP, PT1, PT2  Creatinine:  Lab Results   Component Value Date/Time    Creatinine 0.85 01/05/2017 05:32 PM     Platelet Count:    Lab Results   Component Value Date/Time    PLATELET 457 42/66/5721 05:32 PM         SEPSIS CRITERIA MET? YES    Sepsis=2 or more SIRS criteria + infection  SIRS Criteria:   Temperature < 96.8°F (36°C) or > 100.9°F (38.3°C)    Heart Rate > 90 beats per minute    Respiratory Rate > 20 beats per minute    WBC count > 12,000 or <4,000 or > 10% bands     SEVERE SEPSIS CRITERIA MET? YES   SIRS criteria Met? YES   Documented source of infection? YES   Evidence of Organ Dysfunction? NO    Severe Sepsis = SIRS Criteria + Source of Infection + Organ Dysfunction  Organ Dysfunction is met when the patient has new onset any of the following:   SBP<90 or MAP<65 or decrease in SBP more than 40 points from baseline    Bilirubin>2    INR>1.5 or aPTT>60    Creatinine>2 or UO<0.5 ml/kg/hr for 2 hours    Platelet count < 770,534    Lactate >2    Acute Respiratory Failure (BiPap/CPAP/Ventilator)    SEPTIC SHOCK CRITERIA MET? NO   Severe Sepsis criteria met?  NO   Initial Lactic Acid > 4? NO   Persistent hypotension after 30ml/kg fluid bolus completion?  NO    Septic Shock = Severe Sepsis + Any of the following   Initial Lactate > 4   Persistent hypotension defined as 2 consecutive systolic BP's less than 90 and/or MAP less 65 within the first hour after fluid bolus completion

## 2017-01-06 NOTE — PROGRESS NOTES
Nutrition initial assessment/  Plan of care      RECOMMENDATIONS:     1. Regular diet  2. Monitor weight, labs and PO intake  3. RD to follow     GOALS:     1. PO intake meets >75% of protein/calorie needs by 1/11  2. Weight Maintenance/Gradual weight gain (1-2 lb by 1/13)      ASSESSMENT:     Weight status is classified as normal per BMI of 20. However, patient is at nutrition risk due to BMI below 23 with patient above 72years of age. Tolerating diet. Labs noted. Nutrition recommendations listed. RD to follow. Nutrition Diagnoses:   Inadequate oral intake related to poor appetite and nausea as evidenced by patient reported poor intake of meals for past week PTA. Nutrition Risk:  [] High  [x] Moderate []  Low    SUBJECTIVE/OBJECTIVE:      Patient admitted with sepsis. Patient reports usually having a good appetite but with poor appetite for last week since becoming sick. Denies food allergies and problems with chewing/swallowing. States UBW between 120-123 lb. Reports currently having a good appetite and states eating most of breakfast meal this morning. Encouraged adequate intake. Will monitor. Information Obtained from:    [x] Chart Review   [x] Patient   [] Family/Caregiver   [] Nurse/Physician   [] Interdisciplinary Meeting/Rounds    Diet: Regular diet  Medications: [x] Reviewed    Allergies: [x] Reviewed   Encounter Diagnoses     ICD-10-CM ICD-9-CM   1.  CAP (community acquired pneumonia) J18.9 5     Past Medical History   Diagnosis Date    [facility]       Labs:    Lab Results   Component Value Date/Time    Sodium 139 01/06/2017 04:00 AM    Potassium 3.6 01/06/2017 04:00 AM    Chloride 104 01/06/2017 04:00 AM    CO2 24 01/06/2017 04:00 AM    Anion gap 11 01/06/2017 04:00 AM    Glucose 101 01/06/2017 04:00 AM    BUN 25 01/06/2017 04:00 AM    Creatinine 0.64 01/06/2017 04:00 AM    Calcium 7.3 01/06/2017 04:00 AM    Albumin 2.9 01/05/2017 05:32 PM Anthropometrics: BMI (calculated): 20  Last 3 Recorded Weights in this Encounter    01/05/17 1727 01/05/17 1729   Weight: 54.4 kg (120 lb) 54.4 kg (120 lb)      Ht Readings from Last 1 Encounters:   01/05/17 5' 5\" (1.651 m)     No data found. IBW: 125 lb %IBW: 96%    Estimated Nutrition Needs: [x] MSJ  [] Other:  Calories: 1420 Kcal Based on:   [x] Actual BW    Protein:  65 g Based on:   [x] Actual BW    Fluid:       1600 ml Based on:   [x] Actual BW      [x] No Cultural, Orthodox or ethnic dietary need identified.     [] Cultural, Orthodox and ethnic food preferences identified and addressed     Wt Status:  [x] Normal (18.6 - 24.9) [] Underweight (<18.5) [] Overweight (25 - 29.9) [] Mild Obesity (30 - 34.9)  [] Moderate Obesity (35 - 39.9) [] Morbid Obesity (40+)     Nutrition Problems Identified:   [x] Suboptimal PO intake (improved)  [] Food Allergies  [] Difficulty chewing/swallowing/poor dentition  [] Constipation/Diarrhea   [x] Nausea/Vomiting (resolved)  [] None  [] Other:     Plan:   [] Therapeutic Diet  [x]  Obtained/adjusted food preferences/tolerances and/or snacks options   []  Supplements added   [] Occupational therapy following for feeding techniques  []  HS snack added   []  Modify diet texture   []  Modify diet for food allergies   []  Assist with menu selection   [x]  Monitor PO intake on meal rounds   [x]  Continue inpatient monitoring and intervention   []  Participated in discharge planning/Interdisciplinary rounds/Team meetings   []  Other:     Education Needs:   [] Not appropriate for teaching at this time due to:   [x] Identified and addressed    Nutrition Monitoring and Evaluation:  [x] Continue ongoing monitoring and intervention  [] Other    Sheyla Whiteside

## 2017-01-06 NOTE — PROGRESS NOTES
conducted an initial consultation and Spiritual Assessment for [employer], who is a 70 y.o.,female. Patients Primary Language is: [place]. According to the patients EMR Alevism Affiliation is: Denominational. The reason the Patient came to the hospital is:   Patient Active Problem List    Diagnosis Date Noted    Pneumonia 01/05/2017    Sepsis ([facility] [address] 01/05/2017        The  provided the following Interventions:  Initiated a relationship of care and support. Explored issues of nathan, belief, spirituality and Yarsani/ritual needs while hospitalized. Listened empathically. Provided chaplaincy education. Provided information about Spiritual Care Services. Offered prayer and assurance of continued prayers on patient's behalf. Chart reviewed. The following outcomes were achieved:  Patient shared limited information about both their medical narrative and spiritual journey/beliefs. Patient processed feeling about current hospitalization. Patient expressed gratitude for the 's visit. Assessment:  Patient does not have any Yarsani/cultural needs that will affect patients preferences in health care. Patient did not indicate any spiritual or Yarsani issues which require Spiritual Care Services interventions at this time. Plan:  Chaplains will continue to follow and will provide pastoral care on an as needed/requested basis.  recommends bedside caregivers page  on duty if patient shows signs of acute spiritual or emotional distress.     [facility]   Staff [address]   [phone]

## 2017-01-06 NOTE — PROGRESS NOTES
[facility] [address]   Discharge Planning/ Assessment    Reasons for Intervention: Interviewed patient, she agree to share her discharge information with her spouse, see below. She was independent prior to admission and see Dr Feliciano Lancaster. Her discharge plan is to return home. Case management will follow for discharge needs.       High Risk Criteria  [x] Yes  []No   Physician Referral  [] Yes  [x]No        Date    Nursing Referral  [] Yes  [x]No        Date    Patient/Family Request  [] Yes  [x]No        Date       Resources:    Medicare  [x] Yes  []No   Medicaid  [] Yes  [x]No   No Resources  [] Yes  [x]No   Private Insurance  [] Yes  [x]No    Name/Phone Number    Other  [] Yes  [x]No        (i.e. Workman's Comp)         Prior Services:    Prior Services  [] Yes  [x]No   Home Health  [] Yes  [x]No   [address]  [] Yes  [x]No        [address] Program  [] Yes  [x]No       Meals on Wheels  [] Yes  [x]No   Office on Aging  [] Yes  [x]No   Transportation Services  [] Yes  [x]No   Nursing Home  [] Yes  [x]No        Nursing Home Name    [facility]  [] Yes  [x]No        [address] Name    Other       Information Source:      Information obtained from  [x] Patient  [] Parent   [] [address]  [] Child  [] Spouse   [] Significant Other/Partner   [] Friend      [] EMS    [] Nursing Home Chart          [x] Other: chart   Chart Review  [x] Yes  []No     Family/Support System:    Patient lives with  [] Alone    [] Spouse   [] Significant Other  [] Children  [] Caretaker   [] Parent  [] Sibling     [x] Other chart      Other Support System:    Is the patient responsible for care of others  [] Yes  [x]No   Information of person caring for patient on  discharge yes   Managers financial affairs independently  [x] Yes  []No   If no, explain:      Status Prior to Admission:    Mental Status  [x] Awake  [x] Alert  [x] Oriented  [] Quiet/Calm [] Lethargic/Sedated   [] Disoriented  [] Restless/Anxious  [] Combative   Personal Care  [] Dependent  [x] Independent Personal Care  [] Requires Assistance   Meal Preparation Ability  [x] Independent   [] Standby Assistance   [] Minimal Assistance   [] Moderate Assistance  [] Maximum Assistance     [] Total Assistance   Chores  [x] Independent with Chores   [] N/A Nursing Home Resident   [] Requires Assistance   Bowel/Bladder  [x] Continent  [] Catheter  [] Incontinent  [] Ostomy Self-Care    [] Urine Diversion Self-Care  [] Maximum Assistance     [] Total Assistance   Number of Persons needed for assistance    DME at home  [] Emi Tabares  [] Rolando Tabares   [] Commode    [] Bathroom/Grab Bars  [] Hospital Bed  [] Nebulizer  [] Oxygen           [] Raised Toilet Seat  [] Shower Chair  [] Side Rails for Bed   [] Tub Transfer Bench   [] Rafa Washington  [] Marylin Yee, Standard      [] Other:   Vendor      Treatment Presently Receiving:    Current Treatments  [] Chemotherapy  [] Dialysis  [] Insulin  [] IVAB [x] IVF   [] O2  [] PCA   [] PT   [] RT   [] Tube Feedings   [] Wound Care     Psychosocial Evaluation:    Verbalized Knowledge of Disease Process  [x] Patient  [x]Family   Coping with Disease Process  [x] Patient  [x]Family   Requires Further Counseling Coping with Disease Process  [] Patient  []Family     Identified Projected Needs:    Home Health Aid  [] Yes  [x]No   Transportation  [] Yes  [x]No   Education  [] Yes  [x]No        Specific Education     Financial Counseling  [] Yes  [x]No   Inability to Care for Self/Will Require 24 hour care  [] Yes  [x]No   Pain Management  [] Yes  [x]No   Home Infusion Therapy  [] Yes  [x]No   Oxygen Therapy  [] Yes  [x]No   DME  [] Yes  [x]No   Long Term Care Placement  [] Yes  [x]No   Rehab  [] Yes  [x]No   Physical Therapy  [] Yes  [x]No   Needs Anticipated At This Time  [] Yes  [x]No     Intra-Hospital Referral:    [address]  [] Yes  [x]No     [] Yes  [x]No Patient Representative  [] Yes  [x]No   Staff for Teaching Needs  [] Yes  [x]No   Specialty Teaching Needs     Diabetic Educator  [] Yes  [x]No   Referral for Diabetic Educator Needed  [] Yes  [x]No  If Yes, place order for Nutritionist or Diabetic Consult     Tentative Discharge Plan:    Home with No Services  [x] Yes  []No   Home with [address]  [] Yes  [x]No        If Yes, specify type    [address]  [] Yes  [x]No        If Yes, specify type    Meals on Wheels  [] Yes  [x]No   Office of Aging  [] Yes  [x]No   NHP  [] Yes  [x]No   Return to the Nursing Home  [] Yes  [x]No   Rehab Therapy  [] Yes  [x]No   Acute Rehab  [] Yes  [x]No   Subacute Rehab  [] Yes  [x]No   Private Care  [] Yes  [x]No   Substance Abuse Referral  [] Yes  [x]No   Transportation  [] Yes  [x]No   Chore Service  [] Yes  [x]No   Inpatient Hospice  [] Yes  [x]No   OP RT  [] Yes  [x] No   OP Hemo  [] Yes  [x] No   OP PT  [] Yes  [x]No   Support Group  [] Yes  [x]No   Reach to Recovery  [] Yes  [x]No   OP Oncology Clinic  [] Yes  [x]No   Clinic Appointment  [] Yes  [x]No   DME  [] Yes  [x]No   Comments    Name of D/C Planner or  Given to Patient or Family Sabino Oppenheim, RN   Phone Number [phone] [address] [place] [address]   Date Jan 6 , 2017   Time 919 am   If you are discharged home, whom do you designate to participate in your discharge plan and receive any information needed?      Enter name of Miguel Antonio  spouse        Phone # of designee         Address of [facility] [place] [address]        Updated Jan 6, 2017        Patient refused to designate any           individual

## 2017-01-06 NOTE — ROUTINE PROCESS
Patient arrived to room. Staff in the room. Admission database reviewed and completed with the patient.

## 2017-01-06 NOTE — PROGRESS NOTES
Problem: Discharge Planning  Goal: *Discharge to safe environment  Outcome: Progressing Towards Goal  Plan is to discharge to a safe environment

## 2017-01-06 NOTE — PROGRESS NOTES
Patient has designated her  to participate in his/her discharge plan and to receive any needed information.      Name:   Elisha Benoit  spouse   Ricardo Lexiemikaela Harrell, [address] [place] [address]   Jan 6, 2017     Address:  Phone number:

## 2017-01-06 NOTE — PROGRESS NOTES
Medicine Progress Note    Patient: Randell Fitch   Age:  70 y.o.  DOA: 1/5/2017   Admit Dx / CC: Sepsis ([facility] [address]  Pneumonia  LOS:  LOS: 1 day     Assessment/Plan   Active Problems:    Pneumonia (1/5/2017)      Sepsis ([facility] [address] (1/5/2017)        Additional Plan notes     Pneumonia   - CAP   - Continue Zithromax and Rocephin  - Supplementary O2   - wean 02 as possible     Sepsis  - 2/2 above  - Blood culture 1/4+ G- rods , continue rocephin, await speciation and resistance profile. - await UA results     Chest Pain   - likely 2/2 pneumonia  - EKG and Troponin reviewed  - Pain control as needed  - monitor      Lactic acidosis  - 2/2 sepsis  - normalized     Copd Excerebration  - duoneb and solumedrol   - On Symbicort       DISPO     Anticipated Date of Discharge:  11/9  Anticipated Disposition (home, SNF) : home    Subjective:   Patient seen and examined. SOB improved from yesterday    Objective:     Visit Vitals    BP 96/57 (BP 1 Location: Right arm, BP Patient Position: At rest)    Pulse 95    Temp 97.4 °F (36.3 °C)    Resp 18    Ht 5' 5\" (1.651 m)    Wt 54.4 kg (120 lb)    SpO2 98%    BMI 19.97 kg/m2       Physical Exam:  General appearance: alert, cooperative, no distress, appears stated age  Head: Normocephalic, without obvious abnormality, atraumatic  Neck: supple, trachea midline  Lungs: significant decrease in breath sounds RLL  Heart: regular rate and rhythm, S1, S2 normal, no murmur, click, rub or gallop  Abdomen: soft, non-tender.  Bowel sounds normal. No masses,  no organomegaly  Extremities: extremities normal, atraumatic, no cyanosis or edema  Skin: Skin color, texture, turgor normal. No rashes or lesions  Neurologic: Grossly normal  PSY: Mood and affect normal, appropriately behaved    Intake and Output:  Current Shift:     Last three shifts:       Lab/Data Reviewed:  CMP:   Lab Results   Component Value Date/Time     01/06/2017 04:00 AM    K 3.6 01/06/2017 04:00 AM     01/06/2017 04:00 AM    CO2 24 01/06/2017 04:00 AM    AGAP 11 01/06/2017 04:00 AM     (H) 01/06/2017 04:00 AM    BUN 25 (H) 01/06/2017 04:00 AM    CREA 0.64 01/06/2017 04:00 AM    GFRAA >60 01/06/2017 04:00 AM    GFRNA >60 01/06/2017 04:00 AM    CA 7.3 (L) 01/06/2017 04:00 AM    ALB 2.9 (L) 01/05/2017 05:32 PM    TP 5.9 (L) 01/05/2017 05:32 PM    GLOB 3.0 01/05/2017 05:32 PM    AGRAT 1.0 01/05/2017 05:32 PM    SGOT 28 01/05/2017 05:32 PM    ALT 23 01/05/2017 05:32 PM     CBC:   Lab Results   Component Value Date/Time    WBC 6.6 01/06/2017 04:00 AM    HGB 12.5 01/06/2017 04:00 AM    HCT 36.2 01/06/2017 04:00 AM     01/06/2017 04:00 AM     All Cardiac Markers in the last 24 hours:   Lab Results   Component Value Date/Time     (H) 01/05/2017 05:32 PM    CKMB 1.6 01/05/2017 05:32 PM    CKND1 0.8 01/05/2017 05:32 PM    TROIQ <0.02 01/05/2017 05:32 PM       Medications Reviewed:  Current Facility-Administered Medications   Medication Dose Route Frequency    albuterol-ipratropium (DUO-NEB) 2.5 mg-0.5 mg/3 ml nebulizer solution        sodium chloride (NS) flush 5-10 mL  5-10 mL IntraVENous PRN    cefTRIAXone (ROCEPHIN) 2 g in 0.9% sodium chloride (MBP/ADV) 50 mL MBP  2 g IntraVENous Q24H    azithromycin (ZITHROMAX) 500 mg in 0.9% sodium chloride (MBP/ADV) 250 mL adv  500 mg IntraVENous Q24H    0.9% sodium chloride infusion  125 mL/hr IntraVENous CONTINUOUS    acetaminophen (TYLENOL) tablet 650 mg  650 mg Oral Q4H PRN    morphine injection 2 mg  2 mg IntraVENous Q4H PRN    naloxone (NARCAN) injection 0.4 mg  0.4 mg IntraVENous PRN    ondansetron (ZOFRAN) injection 4 mg  4 mg IntraVENous Q4H PRN    enoxaparin (LOVENOX) injection 40 mg  40 mg SubCUTAneous Q24H    methylPREDNISolone (PF) (SOLU-MEDROL) injection 40 mg  40 mg IntraVENous Q6H    albuterol-ipratropium (DUO-NEB) 2.5 MG-0.5 MG/3 ML  3 mL Nebulization Q6H RT    budesonide (PULMICORT) 500 mcg/2 ml nebulizer suspension  500 mcg Nebulization BID RT    arformoterol (BROVANA) neb solution 15 mcg  15 mcg Nebulization BID RT       Ajit Denise MD    January 6, 2017

## 2017-01-07 LAB
APPEARANCE UR: CLEAR
ATRIAL RATE: 114 BPM
BACTERIA URNS QL MICRO: ABNORMAL /HPF
BILIRUB UR QL: NEGATIVE
CALCULATED P AXIS, ECG09: 73 DEGREES
CALCULATED R AXIS, ECG10: 68 DEGREES
CALCULATED T AXIS, ECG11: 60 DEGREES
COLOR UR: YELLOW
DIAGNOSIS, 93000: NORMAL
EPITH CASTS URNS QL MICRO: ABNORMAL /LPF (ref 0–5)
GLUCOSE UR STRIP.AUTO-MCNC: NEGATIVE MG/DL
HGB UR QL STRIP: NEGATIVE
KETONES UR QL STRIP.AUTO: ABNORMAL MG/DL
LEUKOCYTE ESTERASE UR QL STRIP.AUTO: ABNORMAL
NITRITE UR QL STRIP.AUTO: NEGATIVE
P-R INTERVAL, ECG05: 124 MS
PH UR STRIP: 6.5 [PH] (ref 5–8)
PROT UR STRIP-MCNC: ABNORMAL MG/DL
Q-T INTERVAL, ECG07: 350 MS
QRS DURATION, ECG06: 88 MS
QTC CALCULATION (BEZET), ECG08: 482 MS
RBC #/AREA URNS HPF: NEGATIVE /HPF (ref 0–5)
SP GR UR REFRACTOMETRY: 1.02 (ref 1–1.03)
UROBILINOGEN UR QL STRIP.AUTO: 0.2 EU/DL (ref 0.2–1)
VENTRICULAR RATE, ECG03: 114 BPM
WBC URNS QL MICRO: NEGATIVE /HPF (ref 0–4)

## 2017-01-07 PROCEDURE — 94640 AIRWAY INHALATION TREATMENT: CPT

## 2017-01-07 PROCEDURE — 81001 URINALYSIS AUTO W/SCOPE: CPT | Performed by: EMERGENCY MEDICINE

## 2017-01-07 PROCEDURE — 74011636637 HC RX REV CODE- 636/637: Performed by: HOSPITALIST

## 2017-01-07 PROCEDURE — 87086 URINE CULTURE/COLONY COUNT: CPT | Performed by: HOSPITALIST

## 2017-01-07 PROCEDURE — 74011000258 HC RX REV CODE- 258: Performed by: EMERGENCY MEDICINE

## 2017-01-07 PROCEDURE — 65660000000 HC RM CCU STEPDOWN

## 2017-01-07 PROCEDURE — 74011250637 HC RX REV CODE- 250/637: Performed by: FAMILY MEDICINE

## 2017-01-07 PROCEDURE — 74011250636 HC RX REV CODE- 250/636: Performed by: FAMILY MEDICINE

## 2017-01-07 PROCEDURE — 74011000250 HC RX REV CODE- 250: Performed by: FAMILY MEDICINE

## 2017-01-07 PROCEDURE — 74011250636 HC RX REV CODE- 250/636: Performed by: EMERGENCY MEDICINE

## 2017-01-07 PROCEDURE — 77030020263 HC SOL INJ SOD CL0.9% LFCR 1000ML

## 2017-01-07 PROCEDURE — 77010033678 HC OXYGEN DAILY

## 2017-01-07 RX ORDER — PREDNISONE 20 MG/1
60 TABLET ORAL
Status: DISCONTINUED | OUTPATIENT
Start: 2017-01-07 | End: 2017-01-12

## 2017-01-07 RX ADMIN — IPRATROPIUM BROMIDE AND ALBUTEROL SULFATE 3 ML: .5; 3 SOLUTION RESPIRATORY (INHALATION) at 03:50

## 2017-01-07 RX ADMIN — IPRATROPIUM BROMIDE AND ALBUTEROL SULFATE 3 ML: .5; 3 SOLUTION RESPIRATORY (INHALATION) at 21:13

## 2017-01-07 RX ADMIN — METHYLPREDNISOLONE SODIUM SUCCINATE 40 MG: 40 INJECTION, POWDER, FOR SOLUTION INTRAMUSCULAR; INTRAVENOUS at 00:43

## 2017-01-07 RX ADMIN — ENOXAPARIN SODIUM 40 MG: 40 INJECTION SUBCUTANEOUS at 21:13

## 2017-01-07 RX ADMIN — ALUMINUM HYDROXIDE, MAGNESIUM HYDROXIDE, AND SIMETHICONE 30 ML: 200; 200; 20 SUSPENSION ORAL at 21:12

## 2017-01-07 RX ADMIN — SODIUM CHLORIDE 125 ML/HR: 900 INJECTION, SOLUTION INTRAVENOUS at 15:18

## 2017-01-07 RX ADMIN — Medication 10 ML: at 06:52

## 2017-01-07 RX ADMIN — SODIUM CHLORIDE 500 MG: 900 INJECTION, SOLUTION INTRAVENOUS at 19:16

## 2017-01-07 RX ADMIN — CEFTRIAXONE 2 G: 2 INJECTION, POWDER, FOR SOLUTION INTRAMUSCULAR; INTRAVENOUS at 17:37

## 2017-01-07 RX ADMIN — ARFORMOTEROL TARTRATE 15 MCG: 15 SOLUTION RESPIRATORY (INHALATION) at 21:13

## 2017-01-07 RX ADMIN — BUDESONIDE 500 MCG: 0.5 SUSPENSION RESPIRATORY (INHALATION) at 08:40

## 2017-01-07 RX ADMIN — IPRATROPIUM BROMIDE AND ALBUTEROL SULFATE 3 ML: .5; 3 SOLUTION RESPIRATORY (INHALATION) at 08:40

## 2017-01-07 RX ADMIN — ARFORMOTEROL TARTRATE 15 MCG: 15 SOLUTION RESPIRATORY (INHALATION) at 08:49

## 2017-01-07 RX ADMIN — METHYLPREDNISOLONE SODIUM SUCCINATE 40 MG: 40 INJECTION, POWDER, FOR SOLUTION INTRAMUSCULAR; INTRAVENOUS at 11:37

## 2017-01-07 RX ADMIN — SODIUM CHLORIDE 125 ML/HR: 900 INJECTION, SOLUTION INTRAVENOUS at 06:52

## 2017-01-07 RX ADMIN — BUDESONIDE 500 MCG: 0.5 SUSPENSION RESPIRATORY (INHALATION) at 21:13

## 2017-01-07 RX ADMIN — ACETAMINOPHEN 650 MG: 325 TABLET, FILM COATED ORAL at 11:37

## 2017-01-07 RX ADMIN — IPRATROPIUM BROMIDE AND ALBUTEROL SULFATE 3 ML: .5; 3 SOLUTION RESPIRATORY (INHALATION) at 13:58

## 2017-01-07 RX ADMIN — METHYLPREDNISOLONE SODIUM SUCCINATE 40 MG: 40 INJECTION, POWDER, FOR SOLUTION INTRAMUSCULAR; INTRAVENOUS at 06:51

## 2017-01-07 RX ADMIN — PREDNISONE 60 MG: 20 TABLET ORAL at 17:35

## 2017-01-07 NOTE — ROUTINE PROCESS
Bedside and Verbal shift change report given to [facility] (oncoming nurse) by Osvaldo Carson (offgoing nurse). Report included the following information SBAR, Kardex, Procedure Summary, MAR, Recent Results and Cardiac Rhythm Normal Sinus Rhythm. Jadiel Santana

## 2017-01-07 NOTE — PROGRESS NOTES
Medicine Progress Note    Patient: Maren Arce   Age:  70 y.o.  DOA: 1/5/2017   Admit Dx / CC: Sepsis ([facility] [address]  Pneumonia  LOS:  LOS: 2 days     Assessment/Plan   Active Problems:    Pneumonia (1/5/2017)      Sepsis ([facility] [address] (1/5/2017)        Additional Plan notes     Pneumonia   - CAP   - Continue Zithromax and Rocephin  - Supplementary O2   - wean 02 as possible     Sepsis  - 2/2 above  - Blood culture 1/4+ G- rods , continue rocephin, await speciation and resistance profile.     Chest Pain   - likely 2/2 pneumonia  - EKG and Troponin reviewed  - Pain control as needed  - monitor      Lactic acidosis  - 2/2 sepsis  - normalized     Copd Excerebration  - duoneb and prednisone  - On Symbicort       DISPO     Anticipated Date of Discharge:  11/9  Anticipated Disposition (home, SNF) : home    Subjective:   Patient seen and examined. States feels MUCH better today    Objective:     Visit Vitals    /70 (BP 1 Location: Right arm, BP Patient Position: At rest)    Pulse 100    Temp 98 °F (36.7 °C)    Resp 18    Ht 5' 5\" (1.651 m)    Wt 54.4 kg (120 lb)    SpO2 97%    BMI 19.97 kg/m2       Physical Exam:  General appearance: alert, cooperative, no distress, appears stated age  Head: Normocephalic, without obvious abnormality, atraumatic  Neck: supple, trachea midline  Lungs: significant decrease in breath sounds RLL  Heart: regular rate and rhythm, S1, S2 normal, no murmur, click, rub or gallop  Abdomen: soft, non-tender. Bowel sounds normal. No masses,  no organomegaly  Extremities: extremities normal, atraumatic, no cyanosis or edema  Skin: Skin color, texture, turgor normal. No rashes or lesions  Neurologic: Grossly normal  PSY: Mood and affect normal, appropriately behaved    Intake and Output:  Current Shift:     Last three shifts:  01/05 1901 - 01/07 0700  In: 2878.8 [P.O.:1160;  I.V.:1718.8]  Out: 200 [Urine:200]    Lab/Data Reviewed:  CMP:   No results found for: NA, K, CL, CO2, AGAP, GLU, BUN, CREA, GFRAA, GFRNA, CA, MG, PHOS, ALB, TBIL, TP, ALB, GLOB, AGRAT, SGOT, ALT, GPT  CBC:   No results found for: WBC, HGB, HGBEXT, HCT, HCTEXT, PLT, PLTEXT, HGBEXT, HCTEXT, PLTEXT  All Cardiac Markers in the last 24 hours:   No results found for: CPK, CKMMB, CKMB, RCK3, CKMBT, CKNDX, CKND1, ESTEFANY, TROPT, TROIQ, TEZ, TROPT, TNIPOC, BNP, BNPP    Medications Reviewed:  Current Facility-Administered Medications   Medication Dose Route Frequency    alum-mag hydroxide-simeth (MYLANTA) oral suspension 30 mL  30 mL Oral Q4H PRN    sodium chloride (NS) flush 5-10 mL  5-10 mL IntraVENous PRN    cefTRIAXone (ROCEPHIN) 2 g in 0.9% sodium chloride (MBP/ADV) 50 mL MBP  2 g IntraVENous Q24H    azithromycin (ZITHROMAX) 500 mg in 0.9% sodium chloride (MBP/ADV) 250 mL adv  500 mg IntraVENous Q24H    0.9% sodium chloride infusion  125 mL/hr IntraVENous CONTINUOUS    acetaminophen (TYLENOL) tablet 650 mg  650 mg Oral Q4H PRN    morphine injection 2 mg  2 mg IntraVENous Q4H PRN    naloxone (NARCAN) injection 0.4 mg  0.4 mg IntraVENous PRN    ondansetron (ZOFRAN) injection 4 mg  4 mg IntraVENous Q4H PRN    enoxaparin (LOVENOX) injection 40 mg  40 mg SubCUTAneous Q24H    methylPREDNISolone (PF) (SOLU-MEDROL) injection 40 mg  40 mg IntraVENous Q6H    albuterol-ipratropium (DUO-NEB) 2.5 MG-0.5 MG/3 ML  3 mL Nebulization Q6H RT    budesonide (PULMICORT) 500 mcg/2 ml nebulizer suspension  500 mcg Nebulization BID RT    arformoterol (BROVANA) neb solution 15 mcg  15 mcg Nebulization BID RT       Cele Barton MD    January 7, 2017

## 2017-01-08 LAB
BACTERIA SPEC CULT: NORMAL
GRAM STN SPEC: NORMAL
GRAM STN SPEC: NORMAL
SERVICE CMNT-IMP: NORMAL

## 2017-01-08 PROCEDURE — 77010033678 HC OXYGEN DAILY

## 2017-01-08 PROCEDURE — 74011250637 HC RX REV CODE- 250/637: Performed by: FAMILY MEDICINE

## 2017-01-08 PROCEDURE — 74011250636 HC RX REV CODE- 250/636: Performed by: EMERGENCY MEDICINE

## 2017-01-08 PROCEDURE — 77030020263 HC SOL INJ SOD CL0.9% LFCR 1000ML

## 2017-01-08 PROCEDURE — 74011250636 HC RX REV CODE- 250/636: Performed by: FAMILY MEDICINE

## 2017-01-08 PROCEDURE — 65660000000 HC RM CCU STEPDOWN

## 2017-01-08 PROCEDURE — 94640 AIRWAY INHALATION TREATMENT: CPT

## 2017-01-08 PROCEDURE — 74011000250 HC RX REV CODE- 250: Performed by: FAMILY MEDICINE

## 2017-01-08 PROCEDURE — 74011636637 HC RX REV CODE- 636/637: Performed by: HOSPITALIST

## 2017-01-08 PROCEDURE — 74011000258 HC RX REV CODE- 258: Performed by: EMERGENCY MEDICINE

## 2017-01-08 RX ADMIN — ENOXAPARIN SODIUM 40 MG: 40 INJECTION SUBCUTANEOUS at 20:58

## 2017-01-08 RX ADMIN — ALUMINUM HYDROXIDE, MAGNESIUM HYDROXIDE, AND SIMETHICONE 30 ML: 200; 200; 20 SUSPENSION ORAL at 01:39

## 2017-01-08 RX ADMIN — ARFORMOTEROL TARTRATE 15 MCG: 15 SOLUTION RESPIRATORY (INHALATION) at 08:49

## 2017-01-08 RX ADMIN — IPRATROPIUM BROMIDE AND ALBUTEROL SULFATE 3 ML: .5; 3 SOLUTION RESPIRATORY (INHALATION) at 15:18

## 2017-01-08 RX ADMIN — CEFTRIAXONE 2 G: 2 INJECTION, POWDER, FOR SOLUTION INTRAMUSCULAR; INTRAVENOUS at 18:15

## 2017-01-08 RX ADMIN — BUDESONIDE 500 MCG: 0.5 SUSPENSION RESPIRATORY (INHALATION) at 08:40

## 2017-01-08 RX ADMIN — IPRATROPIUM BROMIDE AND ALBUTEROL SULFATE 3 ML: .5; 3 SOLUTION RESPIRATORY (INHALATION) at 01:39

## 2017-01-08 RX ADMIN — IPRATROPIUM BROMIDE AND ALBUTEROL SULFATE 3 ML: .5; 3 SOLUTION RESPIRATORY (INHALATION) at 21:18

## 2017-01-08 RX ADMIN — ARFORMOTEROL TARTRATE 15 MCG: 15 SOLUTION RESPIRATORY (INHALATION) at 21:18

## 2017-01-08 RX ADMIN — ALUMINUM HYDROXIDE, MAGNESIUM HYDROXIDE, AND SIMETHICONE 30 ML: 200; 200; 20 SUSPENSION ORAL at 20:57

## 2017-01-08 RX ADMIN — IPRATROPIUM BROMIDE AND ALBUTEROL SULFATE 3 ML: .5; 3 SOLUTION RESPIRATORY (INHALATION) at 08:40

## 2017-01-08 RX ADMIN — BUDESONIDE 500 MCG: 0.5 SUSPENSION RESPIRATORY (INHALATION) at 21:18

## 2017-01-08 RX ADMIN — SODIUM CHLORIDE 500 MG: 900 INJECTION, SOLUTION INTRAVENOUS at 19:19

## 2017-01-08 RX ADMIN — SODIUM CHLORIDE 125 ML/HR: 900 INJECTION, SOLUTION INTRAVENOUS at 21:00

## 2017-01-08 RX ADMIN — PREDNISONE 60 MG: 20 TABLET ORAL at 18:15

## 2017-01-08 RX ADMIN — SODIUM CHLORIDE 125 ML/HR: 900 INJECTION, SOLUTION INTRAVENOUS at 01:39

## 2017-01-08 NOTE — PROGRESS NOTES
Medicine Progress Note    Patient: Dona Gomez   Age:  70 y.o.  DOA: 1/5/2017   Admit Dx / CC: Sepsis ([facility] [address]  Pneumonia  LOS:  LOS: 3 days     Assessment/Plan   Active Problems:    Pneumonia (1/5/2017)      Sepsis ([facility] [address] (1/5/2017)        Additional Plan notes     Pneumonia   - CAP   - Continue Zithromax and Rocephin  - Supplementary O2 wean 0ff today only on 1 L this am  - DC TOMORROW ON ABX     Sepsis  - 2/2 above  - Blood culture 1/4+ ecoli pan sensitive  - will need 10-14 days levaquin     Chest Pain   - likely 2/2 pneumonia  - EKG and Troponin reviewed  - Pain control as needed  - monitor      Lactic acidosis  - 2/2 sepsis  - normalized     Copd Excerebration  - duoneb and prednisone  - send on tapering steriod (short)      DISPO     Anticipated Date of Discharge:  11/9  Anticipated Disposition (home, SNF) : home    Subjective:   Patient seen and examined. SOB improved    Objective:     Visit Vitals    BP (!) 157/97 (BP 1 Location: Right arm)    Pulse 97    Temp 97 °F (36.1 °C)    Resp 18    Ht 5' 5\" (1.651 m)    Wt 54.4 kg (120 lb)    SpO2 95%    BMI 19.97 kg/m2       Physical Exam:  General appearance: alert, cooperative, no distress, appears stated age  Head: Normocephalic, without obvious abnormality, atraumatic  Neck: supple, trachea midline  Lungs: significant decrease in breath sounds RLL  Heart: regular rate and rhythm, S1, S2 normal, no murmur, click, rub or gallop  Abdomen: soft, non-tender. Bowel sounds normal. No masses,  no organomegaly  Extremities: extremities normal, atraumatic, no cyanosis or edema  Skin: Skin color, texture, turgor normal. No rashes or lesions  Neurologic: Grossly normal  PSY: Mood and affect normal, appropriately behaved    Intake and Output:  Current Shift:  01/08 0701 - 01/08 1900  In: -   Out: 800 [Urine:800]  Last three shifts:  01/06 1901 - 01/08 0700  In: 5043.8 [P.O.:600;  I.V.:4443.8]  Out: 500 [Urine:500]    Lab/Data Reviewed:  CMP:   No results found for: NA, K, CL, CO2, AGAP, GLU, BUN, CREA, GFRAA, GFRNA, CA, MG, PHOS, ALB, TBIL, TP, ALB, GLOB, AGRAT, SGOT, ALT, GPT  CBC:   No results found for: WBC, HGB, HGBEXT, HCT, HCTEXT, PLT, PLTEXT, HGBEXT, HCTEXT, PLTEXT  All Cardiac Markers in the last 24 hours:   No results found for: CPK, CKMMB, CKMB, RCK3, CKMBT, CKNDX, CKND1, ESTEFANY, TROPT, TROIQ, TEZ, TROPT, TNIPOC, BNP, BNPP    Medications Reviewed:  Current Facility-Administered Medications   Medication Dose Route Frequency    predniSONE (DELTASONE) tablet 60 mg  60 mg Oral DAILY WITH DINNER    alum-mag hydroxide-simeth (MYLANTA) oral suspension 30 mL  30 mL Oral Q4H PRN    sodium chloride (NS) flush 5-10 mL  5-10 mL IntraVENous PRN    cefTRIAXone (ROCEPHIN) 2 g in 0.9% sodium chloride (MBP/ADV) 50 mL MBP  2 g IntraVENous Q24H    azithromycin (ZITHROMAX) 500 mg in 0.9% sodium chloride (MBP/ADV) 250 mL adv  500 mg IntraVENous Q24H    0.9% sodium chloride infusion  125 mL/hr IntraVENous CONTINUOUS    acetaminophen (TYLENOL) tablet 650 mg  650 mg Oral Q4H PRN    morphine injection 2 mg  2 mg IntraVENous Q4H PRN    naloxone (NARCAN) injection 0.4 mg  0.4 mg IntraVENous PRN    ondansetron (ZOFRAN) injection 4 mg  4 mg IntraVENous Q4H PRN    enoxaparin (LOVENOX) injection 40 mg  40 mg SubCUTAneous Q24H    albuterol-ipratropium (DUO-NEB) 2.5 MG-0.5 MG/3 ML  3 mL Nebulization Q6H RT    budesonide (PULMICORT) 500 mcg/2 ml nebulizer suspension  500 mcg Nebulization BID RT    arformoterol (BROVANA) neb solution 15 mcg  15 mcg Nebulization BID RT       Molly Dwoling MD    January 8, 2017

## 2017-01-08 NOTE — PROGRESS NOTES
2003 -- Bedside and Verbal shift change report given to Everett Solis RN (oncoming nurse) by Zachery Dancer., RN (offgoing nurse). Report included the following information SBAR, Procedure Summary, Intake/Output, MAR, Recent Results. Pt up in bed, requesting medication for indigestion, will administer and continue to monitor. Bed locked and lowered, siderails up x3. Call bell at bedside, will continue to monitor.       2113 -- PRN indigestion medication administered, will continue to monitor pt. Assisted pt to restroom.       0028 -- Shift re-assessment completed, no change in pt condition.       0427 -- Shift re-assessment completed, no change in pt condition. 2139 --  Shift re-assessment completed, no change in pt condition.      -- Bedside and Verbal shift change report given to Arabella Zavala RN  (oncoming nurse) by Bárbara Andersen RN (offgoing nurse) Report included the following information SBAR, Procedure Summary, Intake/Output, MAR, Recent Results. Pt up in bed, sleeping, no indication of pain or acute distress. Bed locked and lowered, siderails up x3. Call bell at bedside.

## 2017-01-08 NOTE — PROGRESS NOTES
1200-Assumed patient care. 1230- Heart rate up in the 170s while patient ambulate to the BR. HR resolved gradually when patient returned to bed, no c/o chest pain or syncopy, complained of sob oxygen reattached. 1400- Pt. Resting quietly in bed no change in status. 2000- Breathing treatment administered. 2330-Bedside and Verbal shift change report given to [facility] (oncoming nurse) by Kallie Estrada RN (offgoing nurse). Report included the following information SBAR, Kardex, MAR and Recent Results.

## 2017-01-09 LAB
BACTERIA SPEC CULT: NORMAL
SERVICE CMNT-IMP: NORMAL

## 2017-01-09 PROCEDURE — 87040 BLOOD CULTURE FOR BACTERIA: CPT | Performed by: HOSPITALIST

## 2017-01-09 PROCEDURE — 74011250637 HC RX REV CODE- 250/637: Performed by: FAMILY MEDICINE

## 2017-01-09 PROCEDURE — 90471 IMMUNIZATION ADMIN: CPT

## 2017-01-09 PROCEDURE — 90686 IIV4 VACC NO PRSV 0.5 ML IM: CPT | Performed by: HOSPITALIST

## 2017-01-09 PROCEDURE — 74011250636 HC RX REV CODE- 250/636: Performed by: HOSPITALIST

## 2017-01-09 PROCEDURE — 74011000258 HC RX REV CODE- 258: Performed by: EMERGENCY MEDICINE

## 2017-01-09 PROCEDURE — 36415 COLL VENOUS BLD VENIPUNCTURE: CPT | Performed by: HOSPITALIST

## 2017-01-09 PROCEDURE — 74011636637 HC RX REV CODE- 636/637: Performed by: HOSPITALIST

## 2017-01-09 PROCEDURE — 77010033678 HC OXYGEN DAILY

## 2017-01-09 PROCEDURE — 74011250636 HC RX REV CODE- 250/636: Performed by: EMERGENCY MEDICINE

## 2017-01-09 PROCEDURE — 74011000250 HC RX REV CODE- 250: Performed by: FAMILY MEDICINE

## 2017-01-09 PROCEDURE — 74011250636 HC RX REV CODE- 250/636: Performed by: FAMILY MEDICINE

## 2017-01-09 PROCEDURE — 65660000000 HC RM CCU STEPDOWN

## 2017-01-09 RX ADMIN — IPRATROPIUM BROMIDE AND ALBUTEROL SULFATE 3 ML: .5; 3 SOLUTION RESPIRATORY (INHALATION) at 11:33

## 2017-01-09 RX ADMIN — ENOXAPARIN SODIUM 40 MG: 40 INJECTION SUBCUTANEOUS at 21:33

## 2017-01-09 RX ADMIN — IPRATROPIUM BROMIDE AND ALBUTEROL SULFATE 3 ML: .5; 3 SOLUTION RESPIRATORY (INHALATION) at 21:13

## 2017-01-09 RX ADMIN — BUDESONIDE 500 MCG: 0.5 SUSPENSION RESPIRATORY (INHALATION) at 21:13

## 2017-01-09 RX ADMIN — ARFORMOTEROL TARTRATE 15 MCG: 15 SOLUTION RESPIRATORY (INHALATION) at 21:41

## 2017-01-09 RX ADMIN — ARFORMOTEROL TARTRATE 15 MCG: 15 SOLUTION RESPIRATORY (INHALATION) at 11:34

## 2017-01-09 RX ADMIN — SODIUM CHLORIDE 125 ML/HR: 900 INJECTION, SOLUTION INTRAVENOUS at 05:51

## 2017-01-09 RX ADMIN — INFLUENZA A VIRUS A/CALIFORNIA/7/2009 X-179A (H1N1) ANTIGEN (FORMALDEHYDE INACTIVATED), INFLUENZA A VIRUS A/HONG KONG/4801/2014 X-263B (H3N2) ANTIGEN (FORMALDEHYDE INACTIVATED), INFLUENZA B VIRUS B/PHUKET/3073/2013 ANTIGEN (FORMALDEHYDE INACTIVATED), AND INFLUENZA B VIRUS B/BRISBANE/60/2008 ANTIGEN (FORMALDEHYDE INACTIVATED) 0.5 ML: 15; 15; 15; 15 INJECTION, SUSPENSION INTRAMUSCULAR at 21:33

## 2017-01-09 RX ADMIN — CEFTRIAXONE 2 G: 2 INJECTION, POWDER, FOR SOLUTION INTRAMUSCULAR; INTRAVENOUS at 17:07

## 2017-01-09 RX ADMIN — BUDESONIDE 500 MCG: 0.5 SUSPENSION RESPIRATORY (INHALATION) at 11:34

## 2017-01-09 RX ADMIN — SODIUM CHLORIDE 500 MG: 900 INJECTION, SOLUTION INTRAVENOUS at 17:50

## 2017-01-09 RX ADMIN — IPRATROPIUM BROMIDE AND ALBUTEROL SULFATE 3 ML: .5; 3 SOLUTION RESPIRATORY (INHALATION) at 17:07

## 2017-01-09 RX ADMIN — ALUMINUM HYDROXIDE, MAGNESIUM HYDROXIDE, AND SIMETHICONE 30 ML: 200; 200; 20 SUSPENSION ORAL at 21:33

## 2017-01-09 RX ADMIN — PREDNISONE 60 MG: 20 TABLET ORAL at 17:07

## 2017-01-09 NOTE — PROGRESS NOTES
1240 - Ambulated w/ pt in hallway. O2 sats on RA while walkin-95%; HR into 140s-150s. Pt does get SOB with exertion. O2 while sitting in bed on 2L: 98%, HR: 116.

## 2017-01-09 NOTE — PROGRESS NOTES
Pt says she feels like she's improving. Dry cough. On 1L-2L NC (dose not wear O2 at home). Denies pain. Ambulating to bathroom w/ steady gait. Voiding. 1138 - O2 increased to 2L; sats were 92% on 1L. Pt c/o PULLIAM.     ~ 5912 - Bedside and Verbal shift change report given to Carlos A Vallejo RN (oncoming nurse) by Avel Dunbar RN (offgoing nurse). Report included the following information SBAR, Kardex, Intake/Output, MAR and Recent Results. Pt handed off in stable condition.

## 2017-01-09 NOTE — PROGRESS NOTES
Physical Therapy Screening:  Services are not indicated at this time. [facility] therapy based on results obtained during the nursing admission assessment. The patients chart was reviewed and the patient is not appropriate for a skilled therapy evaluation at this time. Please consult physical therapy if any therapy needs arise. Thank you.     Arleth Garcia, PTA

## 2017-01-09 NOTE — PROGRESS NOTES
TRANSFER - IN REPORT:    Verbal report received from POLLY Ramos  on [employer]  Bedside report. Report consisted of patients Situation, Background, Assessment and   Recommendations(SBAR). Information from the following report(s) SBAR, Kardex, Intake/Output, MAR, Recent Results and Cardiac Rhythm . was reviewed with the receiving nurse. Opportunity for questions and clarification was provided. Assessment completed upon patients arrival to unit and care assumed. 0030 - Shift assessment completed (see assessment flowsheet). Pt pleasant, A&Ox4, ambulating to restroom with minimal assist. Pt left safe call light in reach, bed locked, and in low position.

## 2017-01-10 LAB
BASOPHILS # BLD AUTO: 0 K/UL (ref 0–0.06)
BASOPHILS # BLD: 0 % (ref 0–2)
DIFFERENTIAL METHOD BLD: ABNORMAL
EOSINOPHIL # BLD: 0 K/UL (ref 0–0.4)
EOSINOPHIL NFR BLD: 0 % (ref 0–5)
ERYTHROCYTE [DISTWIDTH] IN BLOOD BY AUTOMATED COUNT: 12.4 % (ref 11.6–14.5)
HCT VFR BLD AUTO: 37.8 % (ref 35–45)
HGB BLD-MCNC: 13.2 G/DL (ref 12–16)
LYMPHOCYTES # BLD AUTO: 14 % (ref 21–52)
LYMPHOCYTES # BLD: 1.6 K/UL (ref 0.9–3.6)
MCH RBC QN AUTO: 40.1 PG (ref 24–34)
MCHC RBC AUTO-ENTMCNC: 34.9 G/DL (ref 31–37)
MCV RBC AUTO: 114.9 FL (ref 74–97)
MONOCYTES # BLD: 0.9 K/UL (ref 0.05–1.2)
MONOCYTES NFR BLD AUTO: 8 % (ref 3–10)
NEUTS SEG # BLD: 9.1 K/UL (ref 1.8–8)
NEUTS SEG NFR BLD AUTO: 78 % (ref 40–73)
PLATELET # BLD AUTO: 197 K/UL (ref 135–420)
PMV BLD AUTO: 9.8 FL (ref 9.2–11.8)
RBC # BLD AUTO: 3.29 M/UL (ref 4.2–5.3)
WBC # BLD AUTO: 11.6 K/UL (ref 4.6–13.2)

## 2017-01-10 PROCEDURE — 74011000250 HC RX REV CODE- 250: Performed by: FAMILY MEDICINE

## 2017-01-10 PROCEDURE — G8978 MOBILITY CURRENT STATUS: HCPCS

## 2017-01-10 PROCEDURE — 77010033678 HC OXYGEN DAILY

## 2017-01-10 PROCEDURE — 97116 GAIT TRAINING THERAPY: CPT

## 2017-01-10 PROCEDURE — 74011250636 HC RX REV CODE- 250/636: Performed by: FAMILY MEDICINE

## 2017-01-10 PROCEDURE — 94640 AIRWAY INHALATION TREATMENT: CPT

## 2017-01-10 PROCEDURE — 74011000258 HC RX REV CODE- 258: Performed by: EMERGENCY MEDICINE

## 2017-01-10 PROCEDURE — 36415 COLL VENOUS BLD VENIPUNCTURE: CPT | Performed by: HOSPITALIST

## 2017-01-10 PROCEDURE — 74011636637 HC RX REV CODE- 636/637: Performed by: HOSPITALIST

## 2017-01-10 PROCEDURE — 74011250636 HC RX REV CODE- 250/636: Performed by: EMERGENCY MEDICINE

## 2017-01-10 PROCEDURE — 97162 PT EVAL MOD COMPLEX 30 MIN: CPT

## 2017-01-10 PROCEDURE — 65660000000 HC RM CCU STEPDOWN

## 2017-01-10 PROCEDURE — 85025 COMPLETE CBC W/AUTO DIFF WBC: CPT | Performed by: HOSPITALIST

## 2017-01-10 PROCEDURE — G8979 MOBILITY GOAL STATUS: HCPCS

## 2017-01-10 RX ORDER — PANTOPRAZOLE SODIUM 40 MG/1
40 TABLET, DELAYED RELEASE ORAL
Status: DISCONTINUED | OUTPATIENT
Start: 2017-01-11 | End: 2017-01-17 | Stop reason: HOSPADM

## 2017-01-10 RX ADMIN — CEFTRIAXONE 2 G: 2 INJECTION, POWDER, FOR SOLUTION INTRAMUSCULAR; INTRAVENOUS at 17:55

## 2017-01-10 RX ADMIN — BUDESONIDE 500 MCG: 0.5 SUSPENSION RESPIRATORY (INHALATION) at 21:39

## 2017-01-10 RX ADMIN — SODIUM CHLORIDE 500 MG: 900 INJECTION, SOLUTION INTRAVENOUS at 21:00

## 2017-01-10 RX ADMIN — ARFORMOTEROL TARTRATE 15 MCG: 15 SOLUTION RESPIRATORY (INHALATION) at 09:00

## 2017-01-10 RX ADMIN — BUDESONIDE 500 MCG: 0.5 SUSPENSION RESPIRATORY (INHALATION) at 08:59

## 2017-01-10 RX ADMIN — ENOXAPARIN SODIUM 40 MG: 40 INJECTION SUBCUTANEOUS at 21:39

## 2017-01-10 RX ADMIN — IPRATROPIUM BROMIDE AND ALBUTEROL SULFATE 3 ML: .5; 3 SOLUTION RESPIRATORY (INHALATION) at 21:39

## 2017-01-10 RX ADMIN — IPRATROPIUM BROMIDE AND ALBUTEROL SULFATE 3 ML: .5; 3 SOLUTION RESPIRATORY (INHALATION) at 02:05

## 2017-01-10 RX ADMIN — IPRATROPIUM BROMIDE AND ALBUTEROL SULFATE 3 ML: .5; 3 SOLUTION RESPIRATORY (INHALATION) at 08:59

## 2017-01-10 RX ADMIN — PREDNISONE 60 MG: 20 TABLET ORAL at 17:55

## 2017-01-10 RX ADMIN — ARFORMOTEROL TARTRATE 15 MCG: 15 SOLUTION RESPIRATORY (INHALATION) at 21:39

## 2017-01-10 NOTE — PROGRESS NOTES
Bedside shift report received from Car Rueda RN; PMI6, on room air sat 93%; hooked on 2l now sat 96-97%; shift assessment completed; no needs voiced at this time    0205: due breathing treatment given    0400: quietly sleeping; no significant changes noted    0600: resting comfortably; needs within reach    0720: Bedside and Verbal shift change report given to Lorenza Martini RN (oncoming nurse) by Gume Sotomayor RN (offgoing nurse). Report included the following information SBAR, Kardex, Intake/Output, Recent Results and Cardiac Rhythm sinus tachycardia.

## 2017-01-10 NOTE — PROGRESS NOTES
Problem: Mobility Impaired (Adult and Pediatric)  Goal: *Acute Goals and Plan of Care (Insert Text)  Physical Therapy Goals  Initiated 1/10/2017 and to be accomplished within 7 day(s)  1. Patient will move from supine to sit and sit to supine in bed with independence. 2. Patient will transfer from bed to chair and chair to bed with modified independence using the least restrictive device. 3. Patient will perform sit to stand with modified independence. 4. Patient will ambulate with modified independence for 150 feet with the least restrictive device. 5. Patient will ascend/descend 1 stairs with Left handrail(s) with minimal assistance/contact guard assist.  Outcome: Progressing Towards Goal  PHYSICAL THERAPY EVALUATION     Patient: David Diego (75 y.o. female)  Date: 1/10/2017  Primary Diagnosis: Sepsis ([address]  Pneumonia        Precautions: Fall         ASSESSMENT :  Patient is a 70year old female admitted to hospital for pneumonia and SOB. Based on the objective data described below, the patient presents with dec LE strength, gait instability, and dec overall activity tolerance. During PT evaluation, pt (S) for bed mobility and sit <> stand transfers w/ RW, (S) - CGA w/ RW to ambulate 100ft. Initially attempted to ambulate w/o RW however pt reported unsteadiness and holding onto hallway handrail to steady. Vitals assessed during ambulation include O2 sats drop to 85% on 2L O2, recovered quickly to 92% with standing rest break and HR to 128 bpm; BP remained stable. Pt will continue to be followed by PT to address any functional mobility needs prior to D/C. Patient will benefit from skilled intervention to address the above impairments.   Patients rehabilitation potential is considered to be Good  Factors which may influence rehabilitation potential include:   [ ]         None noted  [ ]         Mental ability/status  [X]         Medical condition  [ ]         Home/family situation and support systems  [ ] Safety awareness  [ ]         Pain tolerance/management  [ ]         Other:      Recommendations for nursing:  Written on communication board: Amb w/ RW 3x/day  Verbally communicated to: nurse       PLAN :  Recommendations and Planned Interventions:  [ ]           Bed Mobility Training             [X]    Neuromuscular Re-Education  [X]           Transfer Training                   [ ]    Orthotic/Prosthetic Training  [X]           Gait Training                          [ ]    Modalities  [X]           Therapeutic Exercises          [ ]    Edema Management/Control  [X]           Therapeutic Activities            [X]    Patient and Family Training/Education  [ ]           Other (comment):      Frequency/Duration: Patient will be followed by physical therapy 3 times a week to address goals. Discharge Recommendations: Home Health vs Outpatient  Further Equipment Recommendations for Discharge: rolling walker and N/A       SUBJECTIVE:   Patient stated I feel more steady (using RW).       OBJECTIVE DATA SUMMARY:       Past Medical History   Diagnosis Date    Chronic obstructive [facility] disease ([facility]       Past Surgical History   Procedure Laterality Date    Hx gyn        Pr abdomen surgery proc unlisted         Barriers to Learning/Limitations: None  Compensate with: visual, verbal, tactile, kinesthetic cues/model     G CODE:Mobility  Current  CI= 1-19%   Goal  CI= 1-19%. The severity rating is based on the Other Functional Assessment     Eval Complexity: History: HIGH Complexity :3+ comorbidities / personal factors will impact the outcome/ POC Exam:MEDIUM Complexity : 3 Standardized tests and measures addressing body structure, function, activity limitation and / or participation in recreation  Presentation: MEDIUM Complexity : Evolving with changing characteristics  Clinical Decision Making:Medium Complexity .  Overall Complexity:MEDIUM     Prior Level of Function/Home Situation:   Home Situation  Home Environment: Private residence  # Steps to Enter: 1  Rails to Enter: No  One/Two Story Residence: Two story, live on 1st floor  # of Interior Steps: 15  Interior Rails: Left  Living Alone: No (with )  Support Systems: Family member(s)  Patient Expects to be Discharged to[de-identified] Private residence  Current DME Used/Available at Home: None  Tub or Shower Type: Tub/Shower combination  Critical Behavior:  Orientation Level: Oriented X4  Strength:    Strength: Generally decreased, functional  Tone & Sensation:   Tone: Normal  Sensation: Intact  Range Of Motion:  AROM: Generally decreased, functional  PROM: Generally decreased, functional  Functional Mobility:  Bed Mobility:  Supine to Sit: Supervision  Sit to Supine: Supervision  Transfers:  Sit to Stand: Additional time;Supervision  Stand to Sit: Supervision  Balance:   Sitting: Intact; With support (BUE support)  Standing: Intact; With support  Ambulation/Gait Training:  Distance (ft): 100 Feet (ft)  Assistive Device: Walker, rolling  Ambulation - Level of Assistance: Supervision  Gait Abnormalities: Trunk sway increased   Base of Support: Narrowed  Speed/Obdulia: Slow  Step Length: Left shortened;Right shortened     Pre treatment pain level: 0/10  Post treatment pain level: 0/10  Pain Scale 1: Numeric (0 - 10)  Pain Intensity 1: 0   Activity Tolerance:   Significantly decreased  Please refer to the flowsheet for vital signs taken during this treatment. After treatment:   [ ]         Patient left in no apparent distress sitting up in chair  [X]         Patient left in no apparent distress in bed  [X]         Call bell left within reach  [X]         Nursing notified  [X]         Caregiver present  [ ]         Bed alarm activated      COMMUNICATION/EDUCATION:   [X]         Fall prevention education was provided and the patient/caregiver indicated understanding. [ ]         Patient/family have participated as able in goal setting and plan of care.   [X] Patient/family agree to work toward stated goals and plan of care. [ ]         Patient understands intent and goals of therapy, but is neutral about his/her participation. [ ]         Patient is unable to participate in goal setting and plan of care.      Thank you for this referral.  Jorge Del Cid, DPT   Time Calculation: 27 mins

## 2017-01-10 NOTE — ROUTINE PROCESS
0800- assumed care of patient - alert and oriented. No complaints of pain. 0900 - RT at bedside     1115 -  at bedside    1230 - ambulatory with PT - pulse ox results recorded in note. 1315 - IDR's at bedside with Dr. Afsaneh De La Cruz - patient anxious to go home but agrees to remain for continued treatment.     1600 - assisted to BR    1800 - assisted to BR - sitting up for dinner - no complaints

## 2017-01-10 NOTE — ANCILLARY DISCHARGE INSTRUCTIONS
Patient and/or next of kin has been given the [facility] Important Message From Medicare About Your Rights\" letter and all questions were answered.

## 2017-01-10 NOTE — PROGRESS NOTES
[facility]  Hospitalist Division        Inpatient Daily Progress Note    Daily progress Note    Patient: Zack Cehn MRN: [id number]  CSN: [id number]    YOB: 1945  Age: 70 y.o. Sex: female    DOA: 1/5/2017 LOS:  LOS: 5 days                    Chief Complaint:  Shortness of breath       Subjective:      Feeling better. C/o minimal fatigue with ambulation. Currently requiring O2. In NAD. Objective:      Visit Vitals    /87 (BP 1 Location: Right arm, BP Patient Position: Supine)    Pulse (!) 118    Temp 97.4 °F (36.3 °C)    Resp 20    Ht 5' 5\" (1.651 m)    Wt 54.4 kg (120 lb)    SpO2 90%    BMI 19.97 kg/m2           Physical Exam:  General appearance: alert and oriented, cooperative, appears stated age  Head: Normocephalic, without obvious abnormality, atraumatic  Lungs: diminished REBECCA, crackles to LLL, crackles throughout right lobe   Heart: regular rate and rhythm, S1, S2 normal, no murmur, click, rub or gallop  Abdomen: soft, non tender, non distended. Normoactive bowel sounds   Extremities: extremities normal, atraumatic, no cyanosis.  BLE trace edema   Skin: Skin color, texture, turgor normal. No rashes or lesions  Neurologic: grossly normal   PSY: Mood and affect normal, appropriately behaved        Intake and Output:  Current Shift:  01/10 0701 - 01/10 1900  In: 340 [P.O.:340]  Out: 350 [Urine:350]  Last three shifts:  01/08 1901 - 01/10 0700  In: 4791.3 [P.O.:960; I.V.:3831.3]  Out: 1340 [Urine:1340]    Recent Results (from the past 24 hour(s))   CULTURE, BLOOD    Collection Time: 01/09/17  5:57 PM   Result Value Ref Range    Special Requests: PERIPHERAL      Culture result: NO GROWTH AFTER 14 HOURS             Lab Results   Component Value Date/Time    Glucose 101 01/06/2017 04:00 AM    Glucose 66 01/05/2017 05:32 PM        Assessment/Plan:     Patient Active Problem List   Diagnosis Code    Pneumonia J18.9    Sepsis ([address] A41.9 A/P:  CAP: continue Azithromycin and Rocephin, O2.  Monitor respiratory status  COPD: continue Duo-nebs, Brovana, Pulmicort and Prednisone   DVT prophylaxis: Lovenox    PT eval and treat   CBC in am     S[address]  Pager:  [phone]  Office:  [phone]

## 2017-01-11 LAB
ANION GAP BLD CALC-SCNC: 9 MMOL/L (ref 3–18)
BACTERIA SPEC CULT: NORMAL
BASOPHILS # BLD AUTO: 0 K/UL (ref 0–0.06)
BASOPHILS # BLD: 0 % (ref 0–2)
BUN SERPL-MCNC: 9 MG/DL (ref 7–18)
BUN/CREAT SERPL: 32 (ref 12–20)
CALCIUM SERPL-MCNC: 7.8 MG/DL (ref 8.5–10.1)
CHLORIDE SERPL-SCNC: 101 MMOL/L (ref 100–108)
CO2 SERPL-SCNC: 33 MMOL/L (ref 21–32)
CREAT SERPL-MCNC: 0.28 MG/DL (ref 0.6–1.3)
DIFFERENTIAL METHOD BLD: ABNORMAL
EOSINOPHIL # BLD: 0 K/UL (ref 0–0.4)
EOSINOPHIL NFR BLD: 0 % (ref 0–5)
ERYTHROCYTE [DISTWIDTH] IN BLOOD BY AUTOMATED COUNT: 12.7 % (ref 11.6–14.5)
FOLATE SERPL-MCNC: 4.7 NG/ML (ref 3.1–17.5)
GLUCOSE SERPL-MCNC: 120 MG/DL (ref 74–99)
HCT VFR BLD AUTO: 37 % (ref 35–45)
HGB BLD-MCNC: 12.7 G/DL (ref 12–16)
LYMPHOCYTES # BLD AUTO: 7 % (ref 21–52)
LYMPHOCYTES # BLD: 0.7 K/UL (ref 0.9–3.6)
MAGNESIUM SERPL-MCNC: 1.9 MG/DL (ref 1.8–2.4)
MCH RBC QN AUTO: 39.7 PG (ref 24–34)
MCHC RBC AUTO-ENTMCNC: 34.3 G/DL (ref 31–37)
MCV RBC AUTO: 115.6 FL (ref 74–97)
MONOCYTES # BLD: 0.2 K/UL (ref 0.05–1.2)
MONOCYTES NFR BLD AUTO: 2 % (ref 3–10)
NEUTS SEG # BLD: 10.3 K/UL (ref 1.8–8)
NEUTS SEG NFR BLD AUTO: 91 % (ref 40–73)
PLATELET # BLD AUTO: 212 K/UL (ref 135–420)
PMV BLD AUTO: 9.9 FL (ref 9.2–11.8)
POTASSIUM SERPL-SCNC: 3.1 MMOL/L (ref 3.5–5.5)
RBC # BLD AUTO: 3.2 M/UL (ref 4.2–5.3)
SERVICE CMNT-IMP: NORMAL
SODIUM SERPL-SCNC: 143 MMOL/L (ref 136–145)
TSH SERPL DL<=0.05 MIU/L-ACNC: 1.03 UIU/ML (ref 0.36–3.74)
VIT B12 SERPL-MCNC: 867 PG/ML (ref 211–911)
WBC # BLD AUTO: 11.3 K/UL (ref 4.6–13.2)

## 2017-01-11 PROCEDURE — 80048 BASIC METABOLIC PNL TOTAL CA: CPT | Performed by: HOSPITALIST

## 2017-01-11 PROCEDURE — 83735 ASSAY OF MAGNESIUM: CPT | Performed by: NURSE PRACTITIONER

## 2017-01-11 PROCEDURE — 77010033678 HC OXYGEN DAILY

## 2017-01-11 PROCEDURE — 36415 COLL VENOUS BLD VENIPUNCTURE: CPT | Performed by: HOSPITALIST

## 2017-01-11 PROCEDURE — 74011250637 HC RX REV CODE- 250/637: Performed by: HOSPITALIST

## 2017-01-11 PROCEDURE — 82607 VITAMIN B-12: CPT | Performed by: NURSE PRACTITIONER

## 2017-01-11 PROCEDURE — 65660000000 HC RM CCU STEPDOWN

## 2017-01-11 PROCEDURE — 74011000258 HC RX REV CODE- 258: Performed by: EMERGENCY MEDICINE

## 2017-01-11 PROCEDURE — 94640 AIRWAY INHALATION TREATMENT: CPT

## 2017-01-11 PROCEDURE — 85025 COMPLETE CBC W/AUTO DIFF WBC: CPT | Performed by: HOSPITALIST

## 2017-01-11 PROCEDURE — 74011250637 HC RX REV CODE- 250/637: Performed by: NURSE PRACTITIONER

## 2017-01-11 PROCEDURE — 74011636637 HC RX REV CODE- 636/637: Performed by: HOSPITALIST

## 2017-01-11 PROCEDURE — 74011250636 HC RX REV CODE- 250/636: Performed by: FAMILY MEDICINE

## 2017-01-11 PROCEDURE — 74011250636 HC RX REV CODE- 250/636: Performed by: EMERGENCY MEDICINE

## 2017-01-11 PROCEDURE — 84443 ASSAY THYROID STIM HORMONE: CPT | Performed by: NURSE PRACTITIONER

## 2017-01-11 PROCEDURE — 74011000250 HC RX REV CODE- 250: Performed by: FAMILY MEDICINE

## 2017-01-11 RX ORDER — POTASSIUM CHLORIDE 20 MEQ/1
40 TABLET, EXTENDED RELEASE ORAL 2 TIMES DAILY
Status: COMPLETED | OUTPATIENT
Start: 2017-01-11 | End: 2017-01-11

## 2017-01-11 RX ADMIN — IPRATROPIUM BROMIDE AND ALBUTEROL SULFATE 3 ML: .5; 3 SOLUTION RESPIRATORY (INHALATION) at 13:46

## 2017-01-11 RX ADMIN — ARFORMOTEROL TARTRATE 15 MCG: 15 SOLUTION RESPIRATORY (INHALATION) at 21:42

## 2017-01-11 RX ADMIN — SODIUM CHLORIDE 500 MG: 900 INJECTION, SOLUTION INTRAVENOUS at 20:09

## 2017-01-11 RX ADMIN — IPRATROPIUM BROMIDE AND ALBUTEROL SULFATE 3 ML: .5; 3 SOLUTION RESPIRATORY (INHALATION) at 21:28

## 2017-01-11 RX ADMIN — CEFTRIAXONE 2 G: 2 INJECTION, POWDER, FOR SOLUTION INTRAMUSCULAR; INTRAVENOUS at 19:35

## 2017-01-11 RX ADMIN — POTASSIUM CHLORIDE 40 MEQ: 20 TABLET, EXTENDED RELEASE ORAL at 10:04

## 2017-01-11 RX ADMIN — POTASSIUM CHLORIDE 40 MEQ: 20 TABLET, EXTENDED RELEASE ORAL at 19:35

## 2017-01-11 RX ADMIN — PREDNISONE 60 MG: 20 TABLET ORAL at 19:35

## 2017-01-11 RX ADMIN — ENOXAPARIN SODIUM 40 MG: 40 INJECTION SUBCUTANEOUS at 22:47

## 2017-01-11 RX ADMIN — PANTOPRAZOLE SODIUM 40 MG: 40 TABLET, DELAYED RELEASE ORAL at 10:04

## 2017-01-11 RX ADMIN — BUDESONIDE 500 MCG: 0.5 SUSPENSION RESPIRATORY (INHALATION) at 21:29

## 2017-01-11 NOTE — PROGRESS NOTES
2020: bedside shift report received from [facility]; Jos Antoine, on 2l 02 via nc, resting in bed, no sob noted; denies any pain or other discomforts; shift assessment done; needs within  Reach    2139: meds given; watching tv; no complaints voiced    2300: sleeping comfortably; no apparent distress noted    0200: sleeping, with regular, nonlabored breathing    0500: awake at intervals; no problems noted    0720: Bedside and Verbal shift change report given to Miguel Ángel Hanna RN (oncoming nurse) by Chris Daniel RN (offgoing nurse). Report included the following information SBAR, Kardex, Intake/Output, Recent Results and Cardiac Rhythm nsr sinus tach.

## 2017-01-11 NOTE — PROGRESS NOTES
Nutrition follow up/  Plan of care      RECOMMENDATIONS:     1. Regular diet  2. Monitor weight, labs and PO intake  3. RD to follow     GOALS:     1. Met/Ongoing: PO intake meets >75% of protein/calorie needs by 1/18  2. Ongoing: Weight Maintenance/Gradual weight gain (1-2 lb by 1/18)      ASSESSMENT:     Weight status is classified as normal per BMI of 20. However, patient is at nutrition risk due to BMI below 23 with patient above 72years of age. PO intake is adequate. Labs noted. Nutrition recommendations listed. RD to follow. Nutrition Risk:  [] High  [] Moderate [x]  Low    SUBJECTIVE/OBJECTIVE:      Patient admitted with sepsis. Denies food allergies and problems with chewing/swallowing. States UBW between 120-123 lb. Reports having a good appetite and eating most of meals. % intake of meals per vitals. No complaints. Will monitor. Information Obtained from:    [x] Chart Review   [x] Patient   [x] Family/Caregiver   [] Nurse/Physician   [] Interdisciplinary Meeting/Rounds    Diet: Regular diet  Medications: [x] Reviewed    Allergies: [x] Reviewed   Encounter Diagnoses     ICD-10-CM ICD-9-CM   1.  CAP (community acquired pneumonia) J18.9 5     Past Medical History   Diagnosis Date    [facility]       Labs:    Lab Results   Component Value Date/Time    Sodium 143 01/11/2017 04:35 AM    Potassium 3.1 01/11/2017 04:35 AM    Chloride 101 01/11/2017 04:35 AM    CO2 33 01/11/2017 04:35 AM    Anion gap 9 01/11/2017 04:35 AM    Glucose 120 01/11/2017 04:35 AM    BUN 9 01/11/2017 04:35 AM    Creatinine 0.28 01/11/2017 04:35 AM    Calcium 7.8 01/11/2017 04:35 AM    Magnesium 1.9 01/11/2017 04:35 AM    Albumin 2.9 01/05/2017 05:32 PM     Anthropometrics: BMI (calculated): 20  Last 3 Recorded Weights in this Encounter    01/05/17 1727 01/05/17 1729   Weight: 54.4 kg (120 lb) 54.4 kg (120 lb)      Ht Readings from Last 1 Encounters:   01/05/17 5' 5\" (1.651 m)     Patient Vitals for the past 100 hrs:   % Diet Eaten   01/11/17 0957 50 %   01/10/17 1837 100 %   01/10/17 1335 50 %   01/10/17 1028 85 %   01/09/17 1906 100 %   01/09/17 1851 50 %   01/09/17 1844 100 %   01/09/17 1354 50 %   01/07/17 1611 75 %   01/07/17 1358 50 %   01/07/17 0945 75 %     IBW: 125 lb %IBW: 96%    Estimated Nutrition Needs: [x] MSJ  [] Other:  Calories: 1420 Kcal Based on:   [x] Actual BW    Protein:  65 g Based on:   [x] Actual BW    Fluid:       1600 ml Based on:   [x] Actual BW      [x] No Cultural, Pentecostalism or ethnic dietary need identified.     [] Cultural, Pentecostalism and ethnic food preferences identified and addressed     Wt Status:  [x] Normal (18.6 - 24.9) [] Underweight (<18.5) [] Overweight (25 - 29.9) [] Mild Obesity (30 - 34.9)  [] Moderate Obesity (35 - 39.9) [] Morbid Obesity (40+)     Nutrition Problems Identified:   [] Suboptimal PO intake   [] Food Allergies  [] Difficulty chewing/swallowing/poor dentition  [] Constipation/Diarrhea   [] Nausea/Vomiting   [x] None  [] Other:     Plan:   [] Therapeutic Diet  [x]  Obtained/adjusted food preferences/tolerances and/or snacks options   []  Supplements added   [] Occupational therapy following for feeding techniques  []  HS snack added   []  Modify diet texture   []  Modify diet for food allergies   []  Assist with menu selection   [x]  Monitor PO intake on meal rounds   [x]  Continue inpatient monitoring and intervention   []  Participated in discharge planning/Interdisciplinary rounds/Team meetings   []  Other:     Education Needs:   [] Not appropriate for teaching at this time due to:   [x] Identified and addressed    Nutrition Monitoring and Evaluation:  [x] Continue ongoing monitoring and intervention  [] Other    Jose Wagner

## 2017-01-11 NOTE — ROUTINE PROCESS
Bedside and Verbal shift change report given to Terrell Goldberg RN (oncoming nurse) by Isma Juan RN (offgoing nurse). Report included the following information SBAR, Kardex and MAR.

## 2017-01-11 NOTE — PROGRESS NOTES
[facility]  Hospitalist Division        Inpatient Daily Progress Note    Daily progress Note    Patient: Annette Hood MRN: [id number]  CSN: [id number]    YOB: 1945  Age: 70 y.o. Sex: female    DOA: 1/5/2017 LOS:  LOS: 6 days                    Chief Complaint:  Shortness of breath       Subjective:      Continues to require O2. Denies fever or chills. Endorses weakness with exertion. Objective:      Visit Vitals    /84 (BP 1 Location: Right arm, BP Patient Position: At rest)    Pulse (!) 103    Temp 98.3 °F (36.8 °C)    Resp 20    Ht 5' 5\" (1.651 m)    Wt 54.4 kg (120 lb)    SpO2 98%    BMI 19.97 kg/m2           Physical Exam:  General appearance: alert and oriented, cooperative, appears stated age  Head: Normocephalic, without obvious abnormality, atraumatic  Lungs: clear REBECCA, crackles to LLL, crackles to RLL, RML, clear to RUL  Heart: regular rate and rhythm, S1, S2 normal, no murmur, click, rub or gallop  Abdomen: soft, non tender, non distended. Normoactive bowel sounds   Extremities: extremities normal, atraumatic, no cyanosis.  BLE trace edema   Skin: Skin color, texture, turgor normal. No rashes or lesions  Neurologic: grossly normal   PSY: Mood and affect normal, appropriately behaved        Intake and Output:  Current Shift:     Last three shifts:  01/09 1901 - 01/11 0700  In: 1020 [P.O.:970; I.V.:50]  Out: 1450 [Urine:1450]    Recent Results (from the past 24 hour(s))   CBC WITH AUTOMATED DIFF    Collection Time: 01/10/17  2:31 PM   Result Value Ref Range    WBC 11.6 4.6 - 13.2 K/uL    RBC 3.29 (L) 4.20 - 5.30 M/uL    HGB 13.2 12.0 - 16.0 g/dL    HCT 37.8 35.0 - 45.0 %    .9 (H) 74.0 - 97.0 FL    MCH 40.1 (H) 24.0 - 34.0 PG    MCHC 34.9 31.0 - 37.0 g/dL    RDW 12.4 11.6 - 14.5 %    PLATELET 960 678 - 226 K/uL    MPV 9.8 9.2 - 11.8 FL    NEUTROPHILS 78 (H) 40 - 73 %    LYMPHOCYTES 14 (L) 21 - 52 %    MONOCYTES 8 3 - 10 % EOSINOPHILS 0 0 - 5 %    BASOPHILS 0 0 - 2 %    ABS. NEUTROPHILS 9.1 (H) 1.8 - 8.0 K/UL    ABS. LYMPHOCYTES 1.6 0.9 - 3.6 K/UL    ABS. MONOCYTES 0.9 0.05 - 1.2 K/UL    ABS. EOSINOPHILS 0.0 0.0 - 0.4 K/UL    ABS. BASOPHILS 0.0 0.0 - 0.06 K/UL    DF AUTOMATED     CBC WITH AUTOMATED DIFF    Collection Time: 01/11/17  4:35 AM   Result Value Ref Range    WBC 11.3 4.6 - 13.2 K/uL    RBC 3.20 (L) 4.20 - 5.30 M/uL    HGB 12.7 12.0 - 16.0 g/dL    HCT 37.0 35.0 - 45.0 %    .6 (H) 74.0 - 97.0 FL    MCH 39.7 (H) 24.0 - 34.0 PG    MCHC 34.3 31.0 - 37.0 g/dL    RDW 12.7 11.6 - 14.5 %    PLATELET 910 297 - 891 K/uL    MPV 9.9 9.2 - 11.8 FL    NEUTROPHILS 91 (H) 40 - 73 %    LYMPHOCYTES 7 (L) 21 - 52 %    MONOCYTES 2 (L) 3 - 10 %    EOSINOPHILS 0 0 - 5 %    BASOPHILS 0 0 - 2 %    ABS. NEUTROPHILS 10.3 (H) 1.8 - 8.0 K/UL    ABS. LYMPHOCYTES 0.7 (L) 0.9 - 3.6 K/UL    ABS. MONOCYTES 0.2 0.05 - 1.2 K/UL    ABS. EOSINOPHILS 0.0 0.0 - 0.4 K/UL    ABS.  BASOPHILS 0.0 0.0 - 0.06 K/UL    DF AUTOMATED     METABOLIC PANEL, BASIC    Collection Time: 01/11/17  4:35 AM   Result Value Ref Range    Sodium 143 136 - 145 mmol/L    Potassium 3.1 (L) 3.5 - 5.5 mmol/L    Chloride 101 100 - 108 mmol/L    CO2 33 (H) 21 - 32 mmol/L    Anion gap 9 3.0 - 18 mmol/L    Glucose 120 (H) 74 - 99 mg/dL    BUN 9 7.0 - 18 MG/DL    Creatinine 0.28 (L) 0.6 - 1.3 MG/DL    BUN/Creatinine ratio 32 (H) 12 - 20      GFR est AA >60 >60 ml/min/1.73m2    GFR est non-AA >60 >60 ml/min/1.73m2    Calcium 7.8 (L) 8.5 - 10.1 MG/DL   MAGNESIUM    Collection Time: 01/11/17  4:35 AM   Result Value Ref Range    Magnesium 1.9 1.8 - 2.4 mg/dL   VITAMIN B12 & FOLATE    Collection Time: 01/11/17  4:35 AM   Result Value Ref Range    Vitamin B12 867 211 - 911 pg/mL    Folate 4.7 3.10 - 17.50 ng/mL   TSH 3RD GENERATION    Collection Time: 01/11/17  4:35 AM   Result Value Ref Range    TSH 1.03 0.36 - 3.74 uIU/mL           Lab Results   Component Value Date/Time    Glucose 120 01/11/2017 04:35 AM    Glucose 101 01/06/2017 04:00 AM    Glucose 66 01/05/2017 05:32 PM        Assessment/Plan:     Patient Active Problem List   Diagnosis Code    Pneumonia J18.9    [facility]eps[facility] A41.9       A/P:  CAP: continue Azithromycin and Rocephin, O2.  Continue to monitor respiratory status  COPD: continue Duo-nebs, Brovana, Pulmicort and Prednisone   DVT prophylaxis: Lovenox    Continue PT   Check Folate, B12, TSH   Dispo: home soon, may require home O2- discussed with Care Management     LE Kaur-BC  [address]  Hospitalist Division  Pager:  [phone]  Office:  [phone]

## 2017-01-11 NOTE — PROGRESS NOTES
0725 Bedside and Verbal shift change report given to Katelynn Marcus RN (oncoming nurse) by Claudia Causey RN (offgoing nurse). Report included the following information SBAR, Procedure Summary, Intake/Output, MAR, Recent Results and Cardiac Rhythm ST. Pt sleeping in bed, NAD. Bed locked and lowered, siderails up x3. Call bell at bedside, will continue to monitor. 0957 Pt resting in bed, Assessment completed, VS obtained,  Pt provided with scheduled meds. Pt needs met at this time, will continue to monitor, call bell at bedside. 1344 Pt up to restroom,  at bedside. Pt provided with schedule breathing tx. Will continue to monitor. 1625 Pt sleeping in bed, NAD,  at bedside, call bell in reach. Will continue to monitor. 1936 Pt provided with scheduled meds. Pt needs met at this time. 2020 Bedside and Verbal shift change report given to Basim Gracia RN (oncoming nurse) by Katelynn Marcus RN (offgoing nurse).  Report included the following information SBAR, Procedure Summary, Intake/Output, MAR, Recent Results and Cardiac Rhythm ST.

## 2017-01-12 ENCOUNTER — APPOINTMENT (OUTPATIENT)
Dept: GENERAL RADIOLOGY | Age: 72
DRG: 871 | End: 2017-01-12
Attending: PHYSICIAN ASSISTANT
Payer: MEDICARE

## 2017-01-12 LAB
ANION GAP BLD CALC-SCNC: 6 MMOL/L (ref 3–18)
BASOPHILS # BLD AUTO: 0 K/UL (ref 0–0.06)
BASOPHILS # BLD: 0 % (ref 0–2)
BNP SERPL-MCNC: ABNORMAL PG/ML (ref 0–900)
BUN SERPL-MCNC: 19 MG/DL (ref 7–18)
BUN/CREAT SERPL: 33 (ref 12–20)
CALCIUM SERPL-MCNC: 8.6 MG/DL (ref 8.5–10.1)
CHLORIDE SERPL-SCNC: 103 MMOL/L (ref 100–108)
CK MB CFR SERPL CALC: 3.8 % (ref 0–4)
CK MB SERPL-MCNC: 4.2 NG/ML (ref 0.5–3.6)
CK SERPL-CCNC: 110 U/L (ref 26–192)
CO2 SERPL-SCNC: 36 MMOL/L (ref 21–32)
CREAT SERPL-MCNC: 0.57 MG/DL (ref 0.6–1.3)
DIFFERENTIAL METHOD BLD: ABNORMAL
EOSINOPHIL # BLD: 0.1 K/UL (ref 0–0.4)
EOSINOPHIL NFR BLD: 0 % (ref 0–5)
ERYTHROCYTE [DISTWIDTH] IN BLOOD BY AUTOMATED COUNT: 12.6 % (ref 11.6–14.5)
GLUCOSE SERPL-MCNC: 124 MG/DL (ref 74–99)
HCT VFR BLD AUTO: 37.2 % (ref 35–45)
HGB BLD-MCNC: 12.8 G/DL (ref 12–16)
LYMPHOCYTES # BLD AUTO: 7 % (ref 21–52)
LYMPHOCYTES # BLD: 1 K/UL (ref 0.9–3.6)
MCH RBC QN AUTO: 40.1 PG (ref 24–34)
MCHC RBC AUTO-ENTMCNC: 34.4 G/DL (ref 31–37)
MCV RBC AUTO: 116.6 FL (ref 74–97)
MONOCYTES # BLD: 0.8 K/UL (ref 0.05–1.2)
MONOCYTES NFR BLD AUTO: 5 % (ref 3–10)
NEUTS SEG # BLD: 12.4 K/UL (ref 1.8–8)
NEUTS SEG NFR BLD AUTO: 88 % (ref 40–73)
PLATELET # BLD AUTO: 282 K/UL (ref 135–420)
PMV BLD AUTO: 9.8 FL (ref 9.2–11.8)
POTASSIUM SERPL-SCNC: 3.6 MMOL/L (ref 3.5–5.5)
RBC # BLD AUTO: 3.19 M/UL (ref 4.2–5.3)
SODIUM SERPL-SCNC: 145 MMOL/L (ref 136–145)
TROPONIN I SERPL-MCNC: 0.06 NG/ML (ref 0–0.04)
WBC # BLD AUTO: 14.2 K/UL (ref 4.6–13.2)

## 2017-01-12 PROCEDURE — 97116 GAIT TRAINING THERAPY: CPT

## 2017-01-12 PROCEDURE — 74011250636 HC RX REV CODE- 250/636: Performed by: FAMILY MEDICINE

## 2017-01-12 PROCEDURE — 94640 AIRWAY INHALATION TREATMENT: CPT

## 2017-01-12 PROCEDURE — 74011000258 HC RX REV CODE- 258: Performed by: PHYSICIAN ASSISTANT

## 2017-01-12 PROCEDURE — 74011250636 HC RX REV CODE- 250/636: Performed by: NURSE PRACTITIONER

## 2017-01-12 PROCEDURE — 74011000250 HC RX REV CODE- 250: Performed by: FAMILY MEDICINE

## 2017-01-12 PROCEDURE — 77010033678 HC OXYGEN DAILY

## 2017-01-12 PROCEDURE — 36415 COLL VENOUS BLD VENIPUNCTURE: CPT | Performed by: NURSE PRACTITIONER

## 2017-01-12 PROCEDURE — 82550 ASSAY OF CK (CPK): CPT | Performed by: PHYSICIAN ASSISTANT

## 2017-01-12 PROCEDURE — 85025 COMPLETE CBC W/AUTO DIFF WBC: CPT | Performed by: NURSE PRACTITIONER

## 2017-01-12 PROCEDURE — 74011250636 HC RX REV CODE- 250/636: Performed by: PHYSICIAN ASSISTANT

## 2017-01-12 PROCEDURE — 93970 EXTREMITY STUDY: CPT

## 2017-01-12 PROCEDURE — 94760 N-INVAS EAR/PLS OXIMETRY 1: CPT

## 2017-01-12 PROCEDURE — 74011250637 HC RX REV CODE- 250/637: Performed by: HOSPITALIST

## 2017-01-12 PROCEDURE — 65660000000 HC RM CCU STEPDOWN

## 2017-01-12 PROCEDURE — 80048 BASIC METABOLIC PNL TOTAL CA: CPT | Performed by: NURSE PRACTITIONER

## 2017-01-12 PROCEDURE — 83880 ASSAY OF NATRIURETIC PEPTIDE: CPT | Performed by: NURSE PRACTITIONER

## 2017-01-12 PROCEDURE — 71020 XR CHEST PA LAT: CPT

## 2017-01-12 RX ORDER — FUROSEMIDE 10 MG/ML
40 INJECTION INTRAMUSCULAR; INTRAVENOUS 2 TIMES DAILY
Status: DISCONTINUED | OUTPATIENT
Start: 2017-01-12 | End: 2017-01-15

## 2017-01-12 RX ORDER — POTASSIUM CHLORIDE 20 MEQ/1
20 TABLET, EXTENDED RELEASE ORAL DAILY
Status: DISCONTINUED | OUTPATIENT
Start: 2017-01-13 | End: 2017-01-16

## 2017-01-12 RX ORDER — LEVOFLOXACIN 5 MG/ML
750 INJECTION, SOLUTION INTRAVENOUS EVERY 24 HOURS
Status: DISCONTINUED | OUTPATIENT
Start: 2017-01-12 | End: 2017-01-13

## 2017-01-12 RX ADMIN — ARFORMOTEROL TARTRATE 15 MCG: 15 SOLUTION RESPIRATORY (INHALATION) at 22:20

## 2017-01-12 RX ADMIN — METHYLPREDNISOLONE SODIUM SUCCINATE 60 MG: 40 INJECTION, POWDER, FOR SOLUTION INTRAMUSCULAR; INTRAVENOUS at 20:27

## 2017-01-12 RX ADMIN — FUROSEMIDE 40 MG: 10 INJECTION, SOLUTION INTRAMUSCULAR; INTRAVENOUS at 20:28

## 2017-01-12 RX ADMIN — ARFORMOTEROL TARTRATE 15 MCG: 15 SOLUTION RESPIRATORY (INHALATION) at 09:25

## 2017-01-12 RX ADMIN — BUDESONIDE 500 MCG: 0.5 SUSPENSION RESPIRATORY (INHALATION) at 22:20

## 2017-01-12 RX ADMIN — IPRATROPIUM BROMIDE AND ALBUTEROL SULFATE 3 ML: .5; 3 SOLUTION RESPIRATORY (INHALATION) at 05:10

## 2017-01-12 RX ADMIN — PANTOPRAZOLE SODIUM 40 MG: 40 TABLET, DELAYED RELEASE ORAL at 12:32

## 2017-01-12 RX ADMIN — LEVOFLOXACIN 750 MG: 750 INJECTION, SOLUTION INTRAVENOUS at 15:00

## 2017-01-12 RX ADMIN — IPRATROPIUM BROMIDE AND ALBUTEROL SULFATE 3 ML: .5; 3 SOLUTION RESPIRATORY (INHALATION) at 22:20

## 2017-01-12 RX ADMIN — SODIUM CHLORIDE 1500 MG: 900 INJECTION, SOLUTION INTRAVENOUS at 20:42

## 2017-01-12 RX ADMIN — PIPERACILLIN AND TAZOBACTAM 3.38 G: 3; .375 INJECTION, POWDER, LYOPHILIZED, FOR SOLUTION INTRAVENOUS; PARENTERAL at 22:21

## 2017-01-12 RX ADMIN — IPRATROPIUM BROMIDE AND ALBUTEROL SULFATE 3 ML: .5; 3 SOLUTION RESPIRATORY (INHALATION) at 09:25

## 2017-01-12 RX ADMIN — ENOXAPARIN SODIUM 40 MG: 40 INJECTION SUBCUTANEOUS at 22:20

## 2017-01-12 RX ADMIN — BUDESONIDE 500 MCG: 0.5 SUSPENSION RESPIRATORY (INHALATION) at 09:25

## 2017-01-12 NOTE — PROGRESS NOTES
Problem: Mobility Impaired (Adult and Pediatric)  Goal: *Acute Goals and Plan of Care (Insert Text)  Physical Therapy Goals  Initiated 1/10/2017 and to be accomplished within 7 day(s)  1. Patient will move from supine to sit and sit to supine in bed with independence. 2. Patient will transfer from bed to chair and chair to bed with modified independence using the least restrictive device. 3. Patient will perform sit to stand with modified independence. 4. Patient will ambulate with modified independence for 150 feet with the least restrictive device. 5. Patient will ascend/descend 1 stairs with Left handrail(s) with minimal assistance/contact guard assist.   Outcome: Progressing Towards Goal  PHYSICAL THERAPY TREATMENT     Patient: Drema Soulier (75 y.o. female)  Date: 1/12/2017  Diagnosis: [facility]  Pneumonia <principal problem not specified>     Precautions:    Chart, physical therapy assessment, plan of care and goals were reviewed. ASSESSMENT:  No assistance needed for bed mobility. SpO2 at rest on 2L 97%. Ambulated on room air without an assistive device, reciprocal gt pattern, decreased dang, no LOB or path deviations. SpO2 upon returning to room 86% on room air. Donned nasal cannula on 2L, saturation increased to 98% in less than 1 minute. Education:PT progression of care, increasing activity level slowly while weaning from O2  Progression toward goals:  [X]      Improving appropriately and progressing toward goals  [ ]      Improving slowly and progressing toward goals  [ ]      Not making progress toward goals and plan of care will be adjusted       PLAN:  Patient continues to benefit from skilled intervention to address the above impairments. Continue treatment per established plan of care. Discharge Recommendations:  Home Health  Further Equipment Recommendations for Discharge:  N/A       SUBJECTIVE:   Patient stated I have never needed oxygen before.       OBJECTIVE DATA SUMMARY:   Critical Behavior:  Neurologic State: Alert, Appropriate for age  Orientation Level: Oriented X4  Cognition: Appropriate decision making, Appropriate for age attention/concentration, Appropriate safety awareness  Safety/Judgement: Awareness of environment  Functional Mobility Training:  Bed Mobility:  Supine to Sit: Supervision  Sit to Supine: Supervision  Transfers:  Sit to Stand: Stand-by asssistance  Stand to Sit: Supervision  Balance:  Sitting: Intact  Sitting - Static: Good (unsupported)  Sitting - Dynamic: Good (unsupported)  Standing: Intact; Without support  Standing - Static: Good  Standing - Dynamic : Fair (+)  Ambulation/Gait Training:  Distance (ft): 90 Feet (ft)  Assistive Device: Gait belt  Ambulation - Level of Assistance: Contact guard assistance;Stand-by asssistance  Speed/Obdulia: Slow  Pain:  Pre 0  Post 0  Pain Scale 1: Visual  Activity Tolerance:   Fair  Please refer to the flowsheet for vital signs taken during this treatment.   After treatment:   [ ] Patient left in no apparent distress sitting up in chair  [X] Patient left in no apparent distress in bed  [X] Call bell left within reach  [ ] Nursing notified  [ ] Caregiver present  [ ] Bed alarm activated      [address], [place]   Time Calculation: 16 mins

## 2017-01-12 NOTE — PROGRESS NOTES
2020 -- Bedside and Verbal shift change report given to Daysi Velez, RN (oncoming nurse) by Mariel Craven RN, RN (offgoing nurse). Report included the following information SBAR, Procedure Summary, Intake/Output, MAR, Recent Results and Cardiac Rhythm ST. Pt awake in bed no pain concerns. Bed locked and lowered, siderails up x3. Call bell at bedside, will continue to monitor. 0023 -- Shift re-assessment completed, no change in pt condition.      0453 -- Shift re-assessment completed, no change in pt condition.      0715 -- Bedside and Verbal shift change report given to Oleksandr Peguero , POLLY  (oncoming nurse) by Gabby Maria RN (offgoing nurse) Report included the following information SBAR, Procedure Summary, Intake/Output, MAR, Recent Results. Pt up in bed, no indication of pain or acute distress. Bed locked and lowered, siderails up x3. Call bell at bedside.

## 2017-01-12 NOTE — PROGRESS NOTES
[facility] Pharmacy Dosing Services    Consult requested by PA [facility] for  therapy  Indication: CAP, Sepsis  Goal Level(s): 15-20 mcg/ml  Ke: 0.131  T 1/2:  5.3 hours  Vd:  38.1 liters    70 y.o., female   Height / Weight:     Ht Readings from Last 1 Encounters:   01/05/17 165.1 cm (65\")        Wt Readings from Last 1 Encounters:   01/05/17 54.4 kg (120 lb)      Temp: 98 °F (36.7 °C)    Serum Creatinine:   Lab Results   Component Value Date/Time    Creatinine 0.57 01/12/2017 03:14 PM     Creatinine Clearance:  Estimated Creatinine Clearance: 77.7 mL/min (based on Cr of 0.57). BUN:    Lab Results   Component Value Date/Time    BUN 19 01/12/2017 03:14 PM      WBC / C&S:    Lab Results   Component Value Date/Time    WBC 14.2 01/12/2017 03:14 PM    Culture result: NO GROWTH 3 DAYS 01/09/2017 05:57 PM       Other Current Antibiotics:    Current Antimicrobial Therapy (168h ago through future)      Ordered     Start Stop      01/12/17 1825  VANCOMYCIN INFORMATION NOTE  Other,   ONCE      01/14/17 1900 01/15/17 0659    01/12/17 1818  vancomycin (VANCOCIN) 1,000 mg in 0.9% sodium chloride (MBP/ADV) 250 mL adv  1,000 mg,   IntraVENous,   EVERY 12 HOURS      01/13/17 0800 --    01/12/17 1811  vancomycin (VANCOCIN) 1,500 mg in 0.9% sodium chloride 500 mL IVPB  1,500 mg,   IntraVENous,   ONCE      01/12/17 2000 01/13/17 0759    01/12/17 1637  piperacillin-tazobactam (ZOSYN) 3.375 g in 0.9% sodium chloride (MBP/ADV) 100 mL MBP  3.375 g,   IntraVENous,   EVERY 6 HOURS      01/12/17 1700 --    01/12/17 1413  levoFLOXacin (LEVAQUIN) 750 mg in D5W IVPB  750 mg,   IntraVENous,   EVERY 24 HOURS      01/12/17 1500 --              Initiate therapy with loading dose of: Vancomycin IV 1.5 grams for one dose this evening. Follow with maintenance dose of:  vancomycin IV 1 gram every 12 hours. A vancomycin serum trough has been ordered for Saturday evening, 14 January before the fourth maintenance dose.     Pharmacy will follow daily and make changes to dose and/or frequency as needed. CHARLOTTE Harris, Pharmacist  1/12/2017 6:26 PM

## 2017-01-12 NOTE — PROGRESS NOTES
[facility]  Hospitalist Division        Inpatient Daily Progress Note    Daily progress Note    Patient: Drema Soulier MRN: [id number]  CSN: [id number]    YOB: 1945  Age: 70 y.o. Sex: female    DOA: 1/5/2017 LOS:  LOS: 7 days                    Chief Complaint:  Shortness of breath       Subjective:      Denies fever, chills, chest pain or shortness of breath at rest. O2 increased to 2L since yesterday. Fatigue improving. Spouse and niece at bedside. Objective:      Visit Vitals    /76 (BP 1 Location: Right arm, BP Patient Position: Supine)    Pulse (!) 105    Temp 97.7 °F (36.5 °C)    Resp 18    Ht 5' 5\" (1.651 m)    Wt 54.4 kg (120 lb)    SpO2 100%    BMI 19.97 kg/m2           Physical Exam:  General appearance: alert and oriented, cooperative, appears stated age  Head: Normocephalic, without obvious abnormality, atraumatic  Lungs: clear REBECCA, fine crackles to LLL, coarse crackles to RLL, fine crackles RML, clear RUL  Heart: regular rate and rhythm, S1, S2 normal, no murmur, click, rub or gallop  Abdomen: soft, non tender, non distended. Normoactive bowel sounds   Extremities: extremities normal, atraumatic, no cyanosis. BLE trace edema   Skin: Skin color, texture, turgor normal. No rashes or lesions  Neurologic: grossly normal   PSY: Mood and affect normal, appropriately behaved        Intake and Output:  Current Shift:  01/12 0701 - 01/12 1900  In: 340 [P.O.:340]  Out: -   Last three shifts:  01/10 1901 - 01/12 0700  In: 1950 [P.O.:1350; I.V.:600]  Out: 1510 [Urine:1510]    No results found for this or any previous visit (from the past 24 hour(s)).         Lab Results   Component Value Date/Time    Glucose 120 01/11/2017 04:35 AM    Glucose 101 01/06/2017 04:00 AM    Glucose 66 01/05/2017 05:32 PM        Assessment/Plan:     Patient Active Problem List   Diagnosis Code    Pneumonia J18.9    Sepsis ([address] A41.9       A/P:  CAP: continue Rocephin. Change Azithromycin to Levaquin. Continue O2. Check CXR- PA LAT. CBC, BMP, BNP now   COPD: continue Duo-nebs, Brovana, Pulmicort and Prednisone   DVT prophylaxis: Lovenox    Continue PT           LE Braun-ELIEZER Crooks [address]  Pager:  [phone]  Office:  [phone]      1640: CXR noted. Pro-BNP > 11K. IV Lasix, triple antibiotic therapy, 2D echo ordered. Cardiology consulted. Pulmonology consulted.  Continue to follow respiratory status

## 2017-01-12 NOTE — PROGRESS NOTES
0734 Bedside and Verbal shift change report given to Arn Bumpers, RN (oncoming nurse) by Donnald Leventhal, RN (offgoing nurse). Report included the following information SBAR, Procedure Summary, Intake/Output, MAR, Recent Results and Cardiac Rhythm NSR. Pt resting in bed, NAD, no c/o pain. Bed locked and lowered, siderails up x3. Call bell at bedside, will continue to monitor. 1232 Assessment completed, scheduled meds provided. Pt resting in bed,  at bedside, IDRs completed. 1400 Lab called for cbc/chem7 results. 1434 Pt sleeping in bed, NAD.  at bedside. 1600 Pt down for Xray/duplex. 1707 Resting O2 2L-NC: 97%  Resting Room Air: 95%  Ambulating room air: 90%    2015 Bedside and Verbal shift change report given to Donnald Leventhal, RN (oncoming nurse) by Arn Bumpers, RN (offgoing nurse). Report included the following information SBAR, Procedure Summary, Intake/Output, MAR, Recent Results and Cardiac Rhythm NSR.

## 2017-01-12 NOTE — PROCEDURES
Kevin  *** FINAL REPORT ***    Name: MICHAEL DAMICO  MRN: [id number]    Inpatient  : 12 May 1945  HIS Order #: [id number]  [id number] [place] [facility] Visit #: [id number]  Date: 2017    TYPE OF TEST: Peripheral Venous Testing    REASON FOR TEST  Edema    Right Leg:-  Deep venous thrombosis:           No  Superficial venous thrombosis:    No  Deep venous insufficiency:        Not examined  Superficial venous insufficiency: Not examined    Left Leg:-  Deep venous thrombosis:           No  Superficial venous thrombosis:    No  Deep venous insufficiency:        Not examined  Superficial venous insufficiency: Not examined      INTERPRETATION/FINDINGS  Duplex images were obtained using 2-D gray scale, color flow, and  spectral Doppler analysis. Right leg :  1. Deep vein(s) visualized include the common femoral, deep femoral,  proximal femoral, mid femoral, distal femoral, popliteal(above knee),  popliteal(fossa), popliteal(below knee), posterior tibial and peroneal   veins. 2. No evidence of deep venous thrombosis detected in the veins  visualized. 3. No evidence of superficial thrombosis detected in the proximal  greater saphenous vein. Left leg :  1. Deep vein(s) visualized include the common femoral, deep femoral,  proximal femoral, mid femoral, distal femoral, popliteal(above knee),  popliteal(fossa), popliteal(below knee) and posterior tibial veins. 2. No evidence of deep venous thrombosis detected in the veins  visualized. 3. No evidence of superficial thrombosis detected in the proximal  greater saphenous vein. 4. Unable to clearly visualize the peroneal vein. ADDITIONAL COMMENTS    I have personally reviewed the data relevant to the interpretation of  this  study. TECHNOLOGIST: Evaristo Gunderson. ELSA Craven  Signed: 2017 04:13 PM    PHYSICIAN: Isaura Pritchard.  Mi Brown MD  Signed: 2017 11:32 PM

## 2017-01-13 ENCOUNTER — APPOINTMENT (OUTPATIENT)
Dept: GENERAL RADIOLOGY | Age: 72
DRG: 871 | End: 2017-01-13
Attending: RADIOLOGY
Payer: MEDICARE

## 2017-01-13 ENCOUNTER — APPOINTMENT (OUTPATIENT)
Dept: ULTRASOUND IMAGING | Age: 72
DRG: 871 | End: 2017-01-13
Attending: INTERNAL MEDICINE
Payer: MEDICARE

## 2017-01-13 LAB
ANION GAP BLD CALC-SCNC: 10 MMOL/L (ref 3–18)
APPEARANCE FLD: ABNORMAL
BASOPHILS # BLD AUTO: 0 K/UL (ref 0–0.06)
BASOPHILS # BLD: 0 % (ref 0–2)
BUN SERPL-MCNC: 17 MG/DL (ref 7–18)
BUN/CREAT SERPL: 44 (ref 12–20)
CALCIUM SERPL-MCNC: 8.3 MG/DL (ref 8.5–10.1)
CHLORIDE SERPL-SCNC: 99 MMOL/L (ref 100–108)
CO2 SERPL-SCNC: 34 MMOL/L (ref 21–32)
COLOR FLD: YELLOW
CREAT SERPL-MCNC: 0.39 MG/DL (ref 0.6–1.3)
DIFFERENTIAL METHOD BLD: ABNORMAL
EOSINOPHIL # BLD: 0 K/UL (ref 0–0.4)
EOSINOPHIL # FLD: 0 %
EOSINOPHIL NFR BLD: 0 % (ref 0–5)
ERYTHROCYTE [DISTWIDTH] IN BLOOD BY AUTOMATED COUNT: 12.6 % (ref 11.6–14.5)
GLUCOSE FLD-MCNC: 104 MG/DL
GLUCOSE SERPL-MCNC: 137 MG/DL (ref 74–99)
HCT VFR BLD AUTO: 39.4 % (ref 35–45)
HGB BLD-MCNC: 13.3 G/DL (ref 12–16)
LYMPHOCYTES # BLD AUTO: 2 % (ref 21–52)
LYMPHOCYTES # BLD: 0.3 K/UL (ref 0.9–3.6)
LYMPHOCYTES NFR FLD: 11 %
MACROPHAGES NFR FLD: 11 %
MCH RBC QN AUTO: 39.2 PG (ref 24–34)
MCHC RBC AUTO-ENTMCNC: 33.8 G/DL (ref 31–37)
MCV RBC AUTO: 116.2 FL (ref 74–97)
MONOCYTES # BLD: 0.2 K/UL (ref 0.05–1.2)
MONOCYTES NFR BLD AUTO: 1 % (ref 3–10)
MONOCYTES NFR FLD: 0 %
NEUTS BAND # FLD: 0 %
NEUTS SEG # BLD: 13.6 K/UL (ref 1.8–8)
NEUTS SEG NFR BLD AUTO: 97 % (ref 40–73)
NEUTS SEG NFR FLD: 78 %
NUC CELL # FLD: 895 /CU MM
PLATELET # BLD AUTO: 288 K/UL (ref 135–420)
PMV BLD AUTO: 9.6 FL (ref 9.2–11.8)
POTASSIUM SERPL-SCNC: 3.8 MMOL/L (ref 3.5–5.5)
PROT FLD-MCNC: 1.6 G/DL
RBC # BLD AUTO: 3.39 M/UL (ref 4.2–5.3)
RBC # FLD: 440 /CU MM
SODIUM SERPL-SCNC: 143 MMOL/L (ref 136–145)
SPECIMEN SOURCE FLD: ABNORMAL
SPECIMEN SOURCE FLD: NORMAL
SPECIMEN SOURCE FLD: NORMAL
WBC # BLD AUTO: 14.1 K/UL (ref 4.6–13.2)

## 2017-01-13 PROCEDURE — 93306 TTE W/DOPPLER COMPLETE: CPT

## 2017-01-13 PROCEDURE — C1729 CATH, DRAINAGE: HCPCS

## 2017-01-13 PROCEDURE — 74011250636 HC RX REV CODE- 250/636: Performed by: NURSE PRACTITIONER

## 2017-01-13 PROCEDURE — 0W9930Z DRAINAGE OF RIGHT PLEURAL CAVITY WITH DRAINAGE DEVICE, PERCUTANEOUS APPROACH: ICD-10-PCS | Performed by: RADIOLOGY

## 2017-01-13 PROCEDURE — 74011000250 HC RX REV CODE- 250: Performed by: FAMILY MEDICINE

## 2017-01-13 PROCEDURE — 87070 CULTURE OTHR SPECIMN AEROBIC: CPT | Performed by: INTERNAL MEDICINE

## 2017-01-13 PROCEDURE — 74011250637 HC RX REV CODE- 250/637: Performed by: HOSPITALIST

## 2017-01-13 PROCEDURE — 84157 ASSAY OF PROTEIN OTHER: CPT | Performed by: INTERNAL MEDICINE

## 2017-01-13 PROCEDURE — 82945 GLUCOSE OTHER FLUID: CPT | Performed by: INTERNAL MEDICINE

## 2017-01-13 PROCEDURE — 36415 COLL VENOUS BLD VENIPUNCTURE: CPT | Performed by: NURSE PRACTITIONER

## 2017-01-13 PROCEDURE — 32554 ASPIRATE PLEURA W/O IMAGING: CPT

## 2017-01-13 PROCEDURE — 83615 LACTATE (LD) (LDH) ENZYME: CPT | Performed by: INTERNAL MEDICINE

## 2017-01-13 PROCEDURE — 74011250637 HC RX REV CODE- 250/637: Performed by: PHYSICIAN ASSISTANT

## 2017-01-13 PROCEDURE — 83986 ASSAY PH BODY FLUID NOS: CPT | Performed by: INTERNAL MEDICINE

## 2017-01-13 PROCEDURE — 89051 BODY FLUID CELL COUNT: CPT | Performed by: INTERNAL MEDICINE

## 2017-01-13 PROCEDURE — 74011250636 HC RX REV CODE- 250/636: Performed by: FAMILY MEDICINE

## 2017-01-13 PROCEDURE — 74011250637 HC RX REV CODE- 250/637: Performed by: INTERNAL MEDICINE

## 2017-01-13 PROCEDURE — 88112 CYTOPATH CELL ENHANCE TECH: CPT | Performed by: INTERNAL MEDICINE

## 2017-01-13 PROCEDURE — 74011000250 HC RX REV CODE- 250: Performed by: RADIOLOGY

## 2017-01-13 PROCEDURE — 85025 COMPLETE CBC W/AUTO DIFF WBC: CPT | Performed by: NURSE PRACTITIONER

## 2017-01-13 PROCEDURE — 94640 AIRWAY INHALATION TREATMENT: CPT

## 2017-01-13 PROCEDURE — 80048 BASIC METABOLIC PNL TOTAL CA: CPT | Performed by: NURSE PRACTITIONER

## 2017-01-13 PROCEDURE — 65660000000 HC RM CCU STEPDOWN

## 2017-01-13 PROCEDURE — 88305 TISSUE EXAM BY PATHOLOGIST: CPT | Performed by: INTERNAL MEDICINE

## 2017-01-13 PROCEDURE — 71010 XR CHEST SNGL V: CPT

## 2017-01-13 PROCEDURE — 77010033678 HC OXYGEN DAILY

## 2017-01-13 PROCEDURE — 74011250636 HC RX REV CODE- 250/636: Performed by: PHYSICIAN ASSISTANT

## 2017-01-13 RX ORDER — LIDOCAINE HYDROCHLORIDE 10 MG/ML
1-20 INJECTION INFILTRATION; PERINEURAL
Status: COMPLETED | OUTPATIENT
Start: 2017-01-13 | End: 2017-01-13

## 2017-01-13 RX ORDER — MICONAZOLE NITRATE 2 %
1 CREAM WITH APPLICATOR VAGINAL EVERY EVENING
Status: DISCONTINUED | OUTPATIENT
Start: 2017-01-13 | End: 2017-01-13

## 2017-01-13 RX ORDER — LEVOFLOXACIN 500 MG/1
500 TABLET, FILM COATED ORAL EVERY 24 HOURS
Status: DISCONTINUED | OUTPATIENT
Start: 2017-01-13 | End: 2017-01-17 | Stop reason: HOSPADM

## 2017-01-13 RX ORDER — GUAIFENESIN 100 MG/5ML
81 LIQUID (ML) ORAL DAILY
Status: DISCONTINUED | OUTPATIENT
Start: 2017-01-13 | End: 2017-01-17 | Stop reason: HOSPADM

## 2017-01-13 RX ORDER — NYSTATIN 100000 [USP'U]/ML
500000 SUSPENSION ORAL 4 TIMES DAILY
Status: DISCONTINUED | OUTPATIENT
Start: 2017-01-13 | End: 2017-01-17 | Stop reason: HOSPADM

## 2017-01-13 RX ORDER — LEVOFLOXACIN 750 MG/1
750 TABLET ORAL
Status: DISCONTINUED | OUTPATIENT
Start: 2017-01-14 | End: 2017-01-13

## 2017-01-13 RX ORDER — LISINOPRIL 5 MG/1
2.5 TABLET ORAL DAILY
Status: DISCONTINUED | OUTPATIENT
Start: 2017-01-13 | End: 2017-01-17 | Stop reason: HOSPADM

## 2017-01-13 RX ORDER — LEVOFLOXACIN 750 MG/1
750 TABLET ORAL EVERY 24 HOURS
Status: DISCONTINUED | OUTPATIENT
Start: 2017-01-13 | End: 2017-01-13

## 2017-01-13 RX ORDER — CARVEDILOL 3.12 MG/1
3.12 TABLET ORAL 2 TIMES DAILY WITH MEALS
Status: DISCONTINUED | OUTPATIENT
Start: 2017-01-13 | End: 2017-01-17 | Stop reason: HOSPADM

## 2017-01-13 RX ORDER — MICONAZOLE NITRATE 2 %
1 CREAM WITH APPLICATOR VAGINAL EVERY EVENING
Status: DISCONTINUED | OUTPATIENT
Start: 2017-01-13 | End: 2017-01-17 | Stop reason: HOSPADM

## 2017-01-13 RX ADMIN — CARVEDILOL 3.12 MG: 3.12 TABLET, FILM COATED ORAL at 18:36

## 2017-01-13 RX ADMIN — LEVOFLOXACIN 500 MG: 500 TABLET, FILM COATED ORAL at 18:36

## 2017-01-13 RX ADMIN — LIDOCAINE HYDROCHLORIDE 8 ML: 10 INJECTION, SOLUTION INFILTRATION; PERINEURAL at 17:52

## 2017-01-13 RX ADMIN — BUDESONIDE 500 MCG: 0.5 SUSPENSION RESPIRATORY (INHALATION) at 09:32

## 2017-01-13 RX ADMIN — CARVEDILOL 3.12 MG: 3.12 TABLET, FILM COATED ORAL at 11:26

## 2017-01-13 RX ADMIN — LISINOPRIL 2.5 MG: 5 TABLET ORAL at 11:27

## 2017-01-13 RX ADMIN — MICONAZOLE NITRATE 1 APPLICATOR: 20 CREAM VAGINAL at 20:45

## 2017-01-13 RX ADMIN — FUROSEMIDE 40 MG: 10 INJECTION, SOLUTION INTRAMUSCULAR; INTRAVENOUS at 10:35

## 2017-01-13 RX ADMIN — IPRATROPIUM BROMIDE AND ALBUTEROL SULFATE 3 ML: .5; 3 SOLUTION RESPIRATORY (INHALATION) at 01:38

## 2017-01-13 RX ADMIN — METHYLPREDNISOLONE SODIUM SUCCINATE 60 MG: 40 INJECTION, POWDER, FOR SOLUTION INTRAMUSCULAR; INTRAVENOUS at 01:35

## 2017-01-13 RX ADMIN — IPRATROPIUM BROMIDE AND ALBUTEROL SULFATE 3 ML: .5; 3 SOLUTION RESPIRATORY (INHALATION) at 15:55

## 2017-01-13 RX ADMIN — MICONAZOLE NITRATE 1 APPLICATOR: 20 CREAM VAGINAL at 11:01

## 2017-01-13 RX ADMIN — SODIUM BICARBONATE 1 ML: 0.2 INJECTION, SOLUTION INTRAVENOUS at 17:00

## 2017-01-13 RX ADMIN — ENOXAPARIN SODIUM 40 MG: 40 INJECTION SUBCUTANEOUS at 20:44

## 2017-01-13 RX ADMIN — PANTOPRAZOLE SODIUM 40 MG: 40 TABLET, DELAYED RELEASE ORAL at 10:35

## 2017-01-13 RX ADMIN — POTASSIUM CHLORIDE 20 MEQ: 20 TABLET, EXTENDED RELEASE ORAL at 10:35

## 2017-01-13 RX ADMIN — IPRATROPIUM BROMIDE AND ALBUTEROL SULFATE 3 ML: .5; 3 SOLUTION RESPIRATORY (INHALATION) at 20:44

## 2017-01-13 RX ADMIN — IPRATROPIUM BROMIDE AND ALBUTEROL SULFATE 3 ML: .5; 3 SOLUTION RESPIRATORY (INHALATION) at 09:31

## 2017-01-13 RX ADMIN — FUROSEMIDE 40 MG: 10 INJECTION, SOLUTION INTRAMUSCULAR; INTRAVENOUS at 18:36

## 2017-01-13 RX ADMIN — ASPIRIN 81 MG 81 MG: 81 TABLET ORAL at 11:26

## 2017-01-13 RX ADMIN — NYSTATIN 500000 UNITS: 100000 SUSPENSION ORAL at 18:36

## 2017-01-13 RX ADMIN — ARFORMOTEROL TARTRATE 15 MCG: 15 SOLUTION RESPIRATORY (INHALATION) at 09:31

## 2017-01-13 RX ADMIN — NYSTATIN 500000 UNITS: 100000 SUSPENSION ORAL at 21:59

## 2017-01-13 RX ADMIN — METHYLPREDNISOLONE SODIUM SUCCINATE: 40 INJECTION, POWDER, FOR SOLUTION INTRAMUSCULAR; INTRAVENOUS at 11:27

## 2017-01-13 RX ADMIN — METHYLPREDNISOLONE SODIUM SUCCINATE 60 MG: 40 INJECTION, POWDER, FOR SOLUTION INTRAMUSCULAR; INTRAVENOUS at 06:00

## 2017-01-13 NOTE — PROGRESS NOTES
[facility]  Hospitalist Division        Inpatient Daily Progress Note    Daily progress Note    Patient: Zack Chen MRN: [id number]  CSN: [id number]    YOB: 1945  Age: 70 y.o. Sex: female    DOA: 1/5/2017 LOS:  LOS: 8 days                    Chief Complaint:  Shortness of breath       Subjective:      Feeling better. Reports breathing has improved. Objective:      Visit Vitals    /81    Pulse 66    Temp 97.6 °F (36.4 °C)    Resp 18    Ht 5' 5\" (1.651 m)    Wt 54.4 kg (120 lb)    SpO2 92%    BMI 19.97 kg/m2           Physical Exam:  General appearance: alert and oriented, cooperative, appears stated age  Head: Normocephalic, without obvious abnormality, atraumatic  Lungs: clear REBECCA, fine crackles to LLL, fine crackles to RLL, clear RML, clear RUL  Heart: regular rate and rhythm, S1, S2 normal, no murmur, click, rub or gallop  Abdomen: soft, non tender, non distended. Normoactive bowel sounds   Extremities: extremities normal, atraumatic, no cyanosis.  1+ BLE edema   Skin: Skin color, texture, turgor normal. No rashes or lesions  Neurologic: grossly normal   PSY: Mood and affect normal, appropriately behaved        Intake and Output:  Current Shift:  01/13 0701 - 01/13 1900  In: 120 [P.O.:120]  Out: 600 [Urine:600]  Last three shifts:  01/11 1901 - 01/13 0700  In: 1880 [P.O.:1080; I.V.:800]  Out: 0502 [Urine:1675]    Recent Results (from the past 24 hour(s))   CBC WITH AUTOMATED DIFF    Collection Time: 01/12/17  3:14 PM   Result Value Ref Range    WBC 14.2 (H) 4.6 - 13.2 K/uL    RBC 3.19 (L) 4.20 - 5.30 M/uL    HGB 12.8 12.0 - 16.0 g/dL    HCT 37.2 35.0 - 45.0 %    .6 (H) 74.0 - 97.0 FL    MCH 40.1 (H) 24.0 - 34.0 PG    MCHC 34.4 31.0 - 37.0 g/dL    RDW 12.6 11.6 - 14.5 %    PLATELET 097 879 - 800 K/uL    MPV 9.8 9.2 - 11.8 FL    NEUTROPHILS 88 (H) 40 - 73 %    LYMPHOCYTES 7 (L) 21 - 52 %    MONOCYTES 5 3 - 10 %    EOSINOPHILS 0 0 - 5 % BASOPHILS 0 0 - 2 %    ABS. NEUTROPHILS 12.4 (H) 1.8 - 8.0 K/UL    ABS. LYMPHOCYTES 1.0 0.9 - 3.6 K/UL    ABS. MONOCYTES 0.8 0.05 - 1.2 K/UL    ABS. EOSINOPHILS 0.1 0.0 - 0.4 K/UL    ABS. BASOPHILS 0.0 0.0 - 0.06 K/UL    DF AUTOMATED     METABOLIC PANEL, BASIC    Collection Time: 01/12/17  3:14 PM   Result Value Ref Range    Sodium 145 136 - 145 mmol/L    Potassium 3.6 3.5 - 5.5 mmol/L    Chloride 103 100 - 108 mmol/L    CO2 36 (H) 21 - 32 mmol/L    Anion gap 6 3.0 - 18 mmol/L    Glucose 124 (H) 74 - 99 mg/dL    BUN 19 (H) 7.0 - 18 MG/DL    Creatinine 0.57 (L) 0.6 - 1.3 MG/DL    BUN/Creatinine ratio 33 (H) 12 - 20      GFR est AA >60 >60 ml/min/1.73m2    GFR est non-AA >60 >60 ml/min/1.73m2    Calcium 8.6 8.5 - 10.1 MG/DL   PRO-BNP    Collection Time: 01/12/17  3:14 PM   Result Value Ref Range    NT pro-BNP 52980 (H) 0 - 900 PG/ML   CARDIAC PANEL,(CK, CKMB & TROPONIN)    Collection Time: 01/12/17  3:14 PM   Result Value Ref Range     26 - 192 U/L    CK - MB 4.2 (H) 0.5 - 3.6 ng/ml    CK-MB Index 3.8 0.0 - 4.0 %    Troponin-I, Qt. 0.06 (H) 0.0 - 0.045 NG/ML   CBC WITH AUTOMATED DIFF    Collection Time: 01/13/17  5:04 AM   Result Value Ref Range    WBC 14.1 (H) 4.6 - 13.2 K/uL    RBC 3.39 (L) 4.20 - 5.30 M/uL    HGB 13.3 12.0 - 16.0 g/dL    HCT 39.4 35.0 - 45.0 %    .2 (H) 74.0 - 97.0 FL    MCH 39.2 (H) 24.0 - 34.0 PG    MCHC 33.8 31.0 - 37.0 g/dL    RDW 12.6 11.6 - 14.5 %    PLATELET 891 035 - 097 K/uL    MPV 9.6 9.2 - 11.8 FL    NEUTROPHILS 97 (H) 40 - 73 %    LYMPHOCYTES 2 (L) 21 - 52 %    MONOCYTES 1 (L) 3 - 10 %    EOSINOPHILS 0 0 - 5 %    BASOPHILS 0 0 - 2 %    ABS. NEUTROPHILS 13.6 (H) 1.8 - 8.0 K/UL    ABS. LYMPHOCYTES 0.3 (L) 0.9 - 3.6 K/UL    ABS. MONOCYTES 0.2 0.05 - 1.2 K/UL    ABS. EOSINOPHILS 0.0 0.0 - 0.4 K/UL    ABS.  BASOPHILS 0.0 0.0 - 0.06 K/UL    DF AUTOMATED     METABOLIC PANEL, BASIC    Collection Time: 01/13/17  5:04 AM   Result Value Ref Range    Sodium 143 136 - 145 mmol/L Potassium 3.8 3.5 - 5.5 mmol/L    Chloride 99 (L) 100 - 108 mmol/L    CO2 34 (H) 21 - 32 mmol/L    Anion gap 10 3.0 - 18 mmol/L    Glucose 137 (H) 74 - 99 mg/dL    BUN 17 7.0 - 18 MG/DL    Creatinine 0.39 (L) 0.6 - 1.3 MG/DL    BUN/Creatinine ratio 44 (H) 12 - 20      GFR est AA >60 >60 ml/min/1.73m2    GFR est non-AA >60 >60 ml/min/1.73m2    Calcium 8.3 (L) 8.5 - 10.1 MG/DL           Lab Results   Component Value Date/Time    Glucose 137 01/13/2017 05:04 AM    Glucose 124 01/12/2017 03:14 PM    Glucose 120 01/11/2017 04:35 AM    Glucose 101 01/06/2017 04:00 AM    Glucose 66 01/05/2017 05:32 PM        Assessment/Plan:     Patient Active Problem List   Diagnosis Code    Pneumonia J18.9    Sepsis ([facility] [address] A41.9       A/P:  CAP: continue Rocephin, Levaquin, Vancomycin. O2. Pulmonology consulted   Acute systolic CHF exacerbation: continue Lasix.  Coreg, Lisinopril  2D echo noted, discussed with Cardiology- plan for NM stress test on 1/16  COPD: continue Duo-nebs, Brovana, Pulmicort, Solumedrol   DVT prophylaxis: Lovenox    Continue PT         LE Arnold-ELIEZER Crooks [address]  Pager:  [phone]  Office:  [phone]

## 2017-01-13 NOTE — PROGRESS NOTES
0800 received patient in bed. On her way to cardiology for echo with and without contrast.  1057 Patient refusing IV ABT due to superinfection. Scheduled monistat cream applied. Will continue to monitor. 1150 New medications given. Coreg, Lisinopril, and ASA. /61 HR 66 Before administering. Tolerated all meds. Ambulated to the restroom x2 in the past hour for lasix voiding 400ml. 8330 [facility] rounds completed. Awaiting on orders for PO ABT. 1515 Patient refusing IV ABT. Spoke with NP Edmar Lee and Levaquin was changed to PO. Unable to change zosyn IV ABT. Encouraged patient to continue to take Zosyn and assured her that the monistat would help with the yeast infection and can be taken after ABT as well if needed with doctors order. 1650 Patient transferred to radiology for a thoracentesis and chest xray. 6301 Patient returned from radiology. Chest tube intact on lower right side with pigtail tube attached. Area is clean, dry and intact. Patient denies any pain at this time. Mild SOB. 02 @2L via NC running. Sats 98%. Patient encouraged to lay on left side and to use caution when repositioning. Will continue to follow plan of care. 1900 Bedside and Verbal shift change report given to Law Nguyen RN  (oncoming nurse) by Neville Marcus RN (offgoing nurse) Report included the following information SBAR, Procedure Summary, Intake/Output, MAR, Recent Results.

## 2017-01-13 NOTE — PROGRESS NOTES
Rec'd pt on 1 lpm NC  Pt awake & alert  Administer neb-no adverse reaction    1550-Pt remains on 1 lpm NC  -administer Neb-no adverse reaction

## 2017-01-13 NOTE — CONSULTS
PCCM  Consult, Initial  1/13/2017    Reason for Consult:  ATSP re. worsenig radiographs and persistent (new) oxygen requirement. Hx: Smoker last cigarette 12/26 when she started feeling bad. Admitted 1/5 and \"just not resolving. \"    PEx:   A&A ND  HEENT + thrush   Neck no bruits soft symm no mass no JVD  Lungs no wheeze rhonchi; + BB insp crackle; decr bs R>L bases/dep  CW: no tenderness/ mass; little kyphotic maybe   Cor RRR  Abd soft NT  Extr 1+ diffuse LE dependent edema she says completely new    LABS:  Reviewed  BCx grew E.coli x1 which may be real as she seems to have been pretty sick on admission. No sputum culture  UA was unremarkable       IMPRESSION:  # CAP right side   * E.coli bacteremia?  (1 bottle of 2 +)   # smoker   # new bilat pleural effusions probably due to IVF and decreased mobility; anasarca (mild)    * doubt but will rule out parapneumonic effusion  # Others, including concern for new CAD - seen by cardiology     PLAN:  -Continue abx - PO levaquin sufficient  -Drain right side; send for cultures/ labs  -Stop IVF and meds  -Stop brovana & pulimicort (redundant)  -PO nystatin S/S  -Decrease steroids considerably: I doubt she needs at all but should probably taper now over 7-10 days or so  -Pulmonary Dr Selvin Vargas will follow-up tomorrow  -I may be reached for questions or concerns  - [facility] [phone]     ================================ radiographs ======================      Final result (Exam End: 1/12/2017  3:40 PM)  EXAM: Two-view chest  CLINICAL HISTORY: Short of breath ,  COMPARISON: 4/20/2015  FINDINGS:  Frontal and lateral views of the chest demonstrate bibasilar opacities, right  greater than left. . Cardiac silhouette is likely enlarged, partially obscured. .  No acute bony or soft tissue abnormality. IMPRESSION:  Right greater than left moderate-sized pleural effusions and probable underlying atelectasis versus airspace disease.

## 2017-01-13 NOTE — PROGRESS NOTES
Pt in [address] for right sided chest tube insertion for thoracentesis. Pt mary carmen procedure well. Pt began to c/o chest tightness and coughing during fluid removal (expected finding), stopped removing fluid at 500 ml, coughing decreased, pt noted that chest tightness was decreasing. Pt educated about coughing and pain, verbalizes understanding. Post procedure CXR completed, pt transferred back to room 3012 with radiology RN. Radiology RN gave report to pt's primary RN Imelda.

## 2017-01-13 NOTE — PROCEDURES
Vascular & Interventional Radiology Brief Procedure Note    Interventional Radiologist: Abdoulaye Marie MD    Pre-operative Diagnosis:  Right Pleural Effusion    Post-operative Diagnosis: Same as pre-op dx    Procedure(s) Performed:  Ultrasound guided right thoracentesis and chest tube placement    Anesthesia:  Local    Findings:  8 [employer]-Cent[employer] pigtail placed with ultrasound guidance into right pleural space from a posterior approach. 500 cc straw colored, cloudy fluid removed. Dressing applied and tube locked. Complications: No immediate complications. Chest Xray will follow. Estimated Blood Loss:  minimal    Tubes and Drains: None    Specimens: To lab as requested.     Condition: Good    Disposition:  Inpatient    Abdoulaye Marie MD, MD  [address] [address]  Vascular & Interventional Radiology  1/13/2017

## 2017-01-13 NOTE — CONSULTS
[facility] - Consult Note    Date of  Admission: 1/5/2017  5:04 PM   Primary Care Physician:  Charly Pierce MD  Patient seen and examined independently. Plan to stabilize on meds over the weekend and do nuclear stress test on Monday. Agree with assessment and plan as noted below. Kendal Garcia MD   Assessment:     Patient Active Problem List   Diagnosis Code    Pneumonia J18.9    Sepsis ([facility] [address] A41.9       - Presented with Community Acquired PNA  - Acute systolic CHF exacerbation, elevated BNP, evidence of volume overload on exam  - Preliminary echo with reduced ejection fraction  - Tobacco use  - COPD  - Macrocytosis     Plan:     - Agree with IV Lasix for diuresis  - Adding low dose Coreg, Lisinopril and ASA for cardiomyopathy.  - Will need ischemic workup for new CMO. Cardiac cath vs. Nuclear stress test. May need further diuresis first.  - Will defer macrocytosis to primary team  - Strict I&O's  - Daily weights   - Fluid restriction 1.5L  - Na <2 grams  - CHF education discussed in detail with patient  - Medication compliance encouraged     History of Present Illness: This is a 70 y.o. female admitted for Sepsis ([facility] [address]  Pneumonia. Patient complains of:  Dyspnea, cough    70year old female with a history of COPD and tobacco abuse presented to urgent care initially then transferred to [facility] due to PNA. Her initial complaints were productive white cough, right sided chest pain and dyspnea. Right sided pain radiated to her back, was worse with cough and deep inspiration, would occur intermittently. Has improved since admission. She has been treated with antibiotics, but dyspnea was without significant improvement, and she was noted to have some LE edema. Denies orthopnea, palpitations or PND. Denies known cardiac history, denies hypertension, diabetes, family history of CAD, or hyperlipidemia. She follows with her PCP on a regular basis.        Cardiac risk factors: smoking/ tobacco exposure      Review of Symptoms:  Except as stated above include:  Constitutional:  negative  Respiratory:  +dyspnea, cough  Cardiovascular:  Per HPI  Gastrointestinal: negative  Genitourinary:  negative  Musculoskeletal:  Negative  Neurological:  Negative  Dermatological:  Negative  Endocrinological: Negative  Psychological:  Negative    A comprehensive review of systems was negative except for that written in the HPI. Past Medical History:     Past Medical History   Diagnosis Date    Chronic obstructive pulmonary disease ([facility] [address]          Social History:     Social History     Social History    Marital status:      Spouse name: N/A    Number of children: N/A    Years of education: N/A     Social History Main Topics    Smoking status: Current Every Day Smoker     Packs/day: 0.50    Smokeless tobacco: None    Alcohol use 8.4 oz/week     14 Glasses of wine per week    Drug use: No    Sexual activity: Not Asked     Other Topics Concern    None     Social History Narrative    None        Family History:   History reviewed. No pertinent family history.      Medications:   No Known Allergies     Current Facility-Administered Medications   Medication Dose Route Frequency    miconazole (MICOTIN) 2 % vaginal cream 1 Applicator  1 Applicator Vaginal QPM    levoFLOXacin (LEVAQUIN) 750 mg in D5W IVPB  750 mg IntraVENous Q24H    methylPREDNISolone (PF) (SOLU-MEDROL) injection 60 mg  60 mg IntraVENous Q6H    furosemide (LASIX) injection 40 mg  40 mg IntraVENous BID    piperacillin-tazobactam (ZOSYN) 3.375 g in 0.9% sodium chloride (MBP/ADV) 100 mL MBP  3.375 g IntraVENous Q6H    potassium chloride (K-DUR, KLOR-CON) SR tablet 20 mEq  20 mEq Oral DAILY    vancomycin (VANCOCIN) 1,000 mg in 0.9% sodium chloride (MBP/ADV) 250 mL adv  1,000 mg IntraVENous Q12H    [START ON 1/14/2017] VANCOMYCIN INFORMATION NOTE   Other ONCE    pantoprazole (PROTONIX) tablet 40 mg  40 mg Oral ACB    alum-mag hydroxide-simeth (MYLANTA) oral suspension 30 mL  30 mL Oral Q4H PRN    sodium chloride (NS) flush 5-10 mL  5-10 mL IntraVENous PRN    acetaminophen (TYLENOL) tablet 650 mg  650 mg Oral Q4H PRN    morphine injection 2 mg  2 mg IntraVENous Q4H PRN    naloxone (NARCAN) injection 0.4 mg  0.4 mg IntraVENous PRN    ondansetron (ZOFRAN) injection 4 mg  4 mg IntraVENous Q4H PRN    enoxaparin (LOVENOX) injection 40 mg  40 mg SubCUTAneous Q24H    albuterol-ipratropium (DUO-NEB) 2.5 MG-0.5 MG/3 ML  3 mL Nebulization Q6H RT    budesonide (PULMICORT) 500 mcg/2 ml nebulizer suspension  500 mcg Nebulization BID RT    arformoterol (BROVANA) neb solution 15 mcg  15 mcg Nebulization BID RT         Physical Exam:     Visit Vitals    /83 (BP 1 Location: Right arm, BP Patient Position: At rest)    Pulse 96    Temp 98 °F (36.7 °C)    Resp 16    Ht 5' 5\" (1.651 m)    Wt 120 lb (54.4 kg)    SpO2 94%    BMI 19.97 kg/m2     BP Readings from Last 3 Encounters:   01/13/17 147/83     Pulse Readings from Last 3 Encounters:   01/13/17 96     Wt Readings from Last 3 Encounters:   01/05/17 120 lb (54.4 kg)       General:  alert, cooperative, no distress  Neck:  nontender, no JVD  Lungs:  Rales right base  Heart:  regular rate and rhythm, S1, S2 normal, no murmur, click, rub or gallop  Abdomen:  abdomen is soft without significant tenderness, masses, organomegaly or guarding  Extremities:  edema 2+ bilateral LE's, right > left  Skin: Warm and dry.  no hyperpigmentation, vitiligo, or suspicious lesions  Neuro: alert, oriented x3, affect appropriate  Psych: non focal     Data Review:     Recent Labs      01/13/17   0504  01/12/17   1514  01/11/17   0435   WBC  14.1*  14.2*  11.3   HGB  13.3  12.8  12.7   HCT  39.4  37.2  37.0   PLT  288  282  212     Recent Labs      01/13/17   0504  01/12/17   1514  01/11/17   0435   NA  143  145  143   K  3.8  3.6  3.1*   CL  99*  103  101   CO2  34*  36*  33*   GLU  137*  124*  120*   BUN  17 19*  9   CREA  0.39*  0.57*  0.28*   CA  8.3*  8.6  7.8*   MG   --    --   1.9       Results for orders placed or performed during the hospital encounter of 01/05/17   EKG, 12 LEAD, INITIAL   Result Value Ref Range    Ventricular Rate 114 BPM    Atrial Rate 114 BPM    P-R Interval 124 ms    QRS Duration 88 ms    Q-T Interval 350 ms    QTC Calculation (Bezet) 482 ms    Calculated P Axis 73 degrees    Calculated R Axis 68 degrees    Calculated T Axis 60 degrees    Diagnosis       Sinus tachycardia  Possible Left atrial enlargement  RSR' or QR pattern in V1 suggests right ventricular conduction delay  Borderline ECG  No previous ECGs available  Confirmed by Viktor Zaragoza ([id number]) on 1/7/2017 11:29:51 AM         All Cardiac Markers in the last 24 hours:    Lab Results   Component Value Date/Time     01/12/2017 03:14 PM    CKMB 4.2 (H) 01/12/2017 03:14 PM    CKND1 3.8 01/12/2017 03:14 PM    TROIQ 0.06 (H) 01/12/2017 03:14 PM       Last Lipid:  No results found for: CHOL, CHOLX, CHLST, CHOLV, HDL, LDL, DLDL, LDLC, DLDLP, TGL, TGLX, TRIGL, TRIGP, CHHD, CHHDX    Signed By: Tara Grant.  Telma Basilio     January 13, 2017

## 2017-01-13 NOTE — PROGRESS NOTES
2035 -- Bedside and Verbal shift change report given to Raffy Roe, RN (oncoming nurse) by Rony Gómez RN, RN (offgoing nurse). Report included the following information SBAR, Procedure Summary, Intake/Output, MAR, Recent Results and Cardiac Rhythm ST. Pt awake in bed no pain concerns. Bed locked and lowered, siderails up x3. Call bell at bedside, will continue to monitor. Vancomycin due at 2000 is being brought up from Pharm by [facility] per [employer]. 12 -- Pt wishes to hold all IV medications for the reminder of the night. She wants to discuss with physician in the morning changing all medications to oral.    0012 -- Shift re-assessment completed, no change in pt condition.     0455 -- Shift re-assessment completed, no change in pt condition.         0459 -- Pt states she is experiencing vaginal irritation, she has history of yeast infections when taking antibiotics. Will contact on call physician.      0505 -- Rec'd return call from Dr. Perla Mcmillan, approved Miconazole 2% daily. 9274 -- Bedside and Verbal shift change report given to Katerina Augustin / Shayna AREVALO, RN  (oncoming nurse) by Viviana Castillo, POLLY (offgoing nurse) Report included the following information SBAR, Procedure Summary, Intake/Output, MAR, Recent Results. Pt up in bed, no indication of pain or acute distress. Bed locked and lowered, siderails up x3. Call bell at bedside.

## 2017-01-13 NOTE — PROGRESS NOTES
Reason for Renal Dosing:  Per Renal dosing Policy    Indication: Pneumonia, Sepsis    Patient clinical status and labs ordered/reviewed. Pt Weight Weight: 54.4 kg (120 lb)   Serum Creatinine Lab Results   Component Value Date/Time    Creatinine 0.39 01/13/2017 05:04 AM       Creatinine Clearance Estimated Creatinine Clearance: 113.6 mL/min (based on Cr of 0.39). Round Serum Creatinine to 1 - Crcl = 44 ml/minute   BUN Lab Results   Component Value Date/Time    BUN 17 01/13/2017 05:04 AM       WBC Lab Results   Component Value Date/Time    WBC 14.1 01/13/2017 05:04 AM      Temperature Temp: 98.4 °F (36.9 °C)   HR Pulse (Heart Rate): 82     BP BP: 142/88           Drug type: Quinolone antibacterial    When taking into account the patient's advanced age, her creatinine clearance calculates to be about 44 ml/minute. Drug/dose: renally dosed the levofloxacin to 750 mg PO every 48 hours. Continue to monitor.     Signed Dewayne Salomon PHARMD  Date 1/13/2017  Time 3:27 PM

## 2017-01-14 ENCOUNTER — APPOINTMENT (OUTPATIENT)
Dept: GENERAL RADIOLOGY | Age: 72
DRG: 871 | End: 2017-01-14
Attending: INTERNAL MEDICINE
Payer: MEDICARE

## 2017-01-14 LAB
ANION GAP BLD CALC-SCNC: 9 MMOL/L (ref 3–18)
BASOPHILS # BLD AUTO: 0 K/UL (ref 0–0.06)
BASOPHILS # BLD: 0 % (ref 0–2)
BODY FLD TYPE: NORMAL
BUN SERPL-MCNC: 16 MG/DL (ref 7–18)
BUN/CREAT SERPL: 41 (ref 12–20)
CALCIUM SERPL-MCNC: 8.2 MG/DL (ref 8.5–10.1)
CHLORIDE SERPL-SCNC: 94 MMOL/L (ref 100–108)
CO2 SERPL-SCNC: 38 MMOL/L (ref 21–32)
CREAT SERPL-MCNC: 0.39 MG/DL (ref 0.6–1.3)
DIFFERENTIAL METHOD BLD: ABNORMAL
EOSINOPHIL # BLD: 0 K/UL (ref 0–0.4)
EOSINOPHIL NFR BLD: 0 % (ref 0–5)
ERYTHROCYTE [DISTWIDTH] IN BLOOD BY AUTOMATED COUNT: 12.5 % (ref 11.6–14.5)
GLUCOSE SERPL-MCNC: 82 MG/DL (ref 74–99)
HCT VFR BLD AUTO: 36.2 % (ref 35–45)
HGB BLD-MCNC: 12.6 G/DL (ref 12–16)
LDH FLD L TO P-CCNC: 131 U/L
LYMPHOCYTES # BLD AUTO: 5 % (ref 21–52)
LYMPHOCYTES # BLD: 0.7 K/UL (ref 0.9–3.6)
MCH RBC QN AUTO: 40 PG (ref 24–34)
MCHC RBC AUTO-ENTMCNC: 34.8 G/DL (ref 31–37)
MCV RBC AUTO: 114.9 FL (ref 74–97)
MONOCYTES # BLD: 0.6 K/UL (ref 0.05–1.2)
MONOCYTES NFR BLD AUTO: 5 % (ref 3–10)
NEUTS SEG # BLD: 12.3 K/UL (ref 1.8–8)
NEUTS SEG NFR BLD AUTO: 90 % (ref 40–73)
PH FLD: 7.6 [PH]
PLATELET # BLD AUTO: 297 K/UL (ref 135–420)
PMV BLD AUTO: 9.9 FL (ref 9.2–11.8)
POTASSIUM SERPL-SCNC: 3.1 MMOL/L (ref 3.5–5.5)
RBC # BLD AUTO: 3.15 M/UL (ref 4.2–5.3)
SODIUM SERPL-SCNC: 141 MMOL/L (ref 136–145)
SPECIMEN SOURCE FLD: NORMAL
WBC # BLD AUTO: 13.6 K/UL (ref 4.6–13.2)

## 2017-01-14 PROCEDURE — 74011000250 HC RX REV CODE- 250: Performed by: FAMILY MEDICINE

## 2017-01-14 PROCEDURE — 94640 AIRWAY INHALATION TREATMENT: CPT

## 2017-01-14 PROCEDURE — 74011250637 HC RX REV CODE- 250/637: Performed by: PHYSICIAN ASSISTANT

## 2017-01-14 PROCEDURE — 74011250636 HC RX REV CODE- 250/636: Performed by: FAMILY MEDICINE

## 2017-01-14 PROCEDURE — 74011250636 HC RX REV CODE- 250/636: Performed by: PHYSICIAN ASSISTANT

## 2017-01-14 PROCEDURE — 94760 N-INVAS EAR/PLS OXIMETRY 1: CPT

## 2017-01-14 PROCEDURE — 85025 COMPLETE CBC W/AUTO DIFF WBC: CPT | Performed by: NURSE PRACTITIONER

## 2017-01-14 PROCEDURE — 74011636637 HC RX REV CODE- 636/637: Performed by: INTERNAL MEDICINE

## 2017-01-14 PROCEDURE — 77010033678 HC OXYGEN DAILY

## 2017-01-14 PROCEDURE — 36415 COLL VENOUS BLD VENIPUNCTURE: CPT | Performed by: NURSE PRACTITIONER

## 2017-01-14 PROCEDURE — 65660000000 HC RM CCU STEPDOWN

## 2017-01-14 PROCEDURE — 74011250637 HC RX REV CODE- 250/637: Performed by: INTERNAL MEDICINE

## 2017-01-14 PROCEDURE — 74011250637 HC RX REV CODE- 250/637: Performed by: HOSPITALIST

## 2017-01-14 PROCEDURE — 71010 XR CHEST PORT: CPT

## 2017-01-14 PROCEDURE — 80048 BASIC METABOLIC PNL TOTAL CA: CPT | Performed by: NURSE PRACTITIONER

## 2017-01-14 RX ADMIN — ASPIRIN 81 MG 81 MG: 81 TABLET ORAL at 09:25

## 2017-01-14 RX ADMIN — LEVOFLOXACIN 500 MG: 500 TABLET, FILM COATED ORAL at 17:39

## 2017-01-14 RX ADMIN — CARVEDILOL 3.12 MG: 3.12 TABLET, FILM COATED ORAL at 17:00

## 2017-01-14 RX ADMIN — ENOXAPARIN SODIUM 40 MG: 40 INJECTION SUBCUTANEOUS at 22:53

## 2017-01-14 RX ADMIN — PANTOPRAZOLE SODIUM 40 MG: 40 TABLET, DELAYED RELEASE ORAL at 09:24

## 2017-01-14 RX ADMIN — NYSTATIN 500000 UNITS: 100000 SUSPENSION ORAL at 12:10

## 2017-01-14 RX ADMIN — IPRATROPIUM BROMIDE AND ALBUTEROL SULFATE 3 ML: .5; 3 SOLUTION RESPIRATORY (INHALATION) at 03:40

## 2017-01-14 RX ADMIN — IPRATROPIUM BROMIDE AND ALBUTEROL SULFATE 3 ML: .5; 3 SOLUTION RESPIRATORY (INHALATION) at 09:34

## 2017-01-14 RX ADMIN — PREDNISONE 30 MG: 5 TABLET ORAL at 09:24

## 2017-01-14 RX ADMIN — NYSTATIN 500000 UNITS: 100000 SUSPENSION ORAL at 17:39

## 2017-01-14 RX ADMIN — FUROSEMIDE 40 MG: 10 INJECTION, SOLUTION INTRAMUSCULAR; INTRAVENOUS at 17:39

## 2017-01-14 RX ADMIN — IPRATROPIUM BROMIDE AND ALBUTEROL SULFATE 3 ML: .5; 3 SOLUTION RESPIRATORY (INHALATION) at 14:19

## 2017-01-14 RX ADMIN — POTASSIUM CHLORIDE 20 MEQ: 20 TABLET, EXTENDED RELEASE ORAL at 09:25

## 2017-01-14 RX ADMIN — IPRATROPIUM BROMIDE AND ALBUTEROL SULFATE 3 ML: .5; 3 SOLUTION RESPIRATORY (INHALATION) at 22:53

## 2017-01-14 RX ADMIN — MICONAZOLE NITRATE 1 APPLICATOR: 20 CREAM VAGINAL at 19:08

## 2017-01-14 RX ADMIN — NYSTATIN 500000 UNITS: 100000 SUSPENSION ORAL at 09:23

## 2017-01-14 RX ADMIN — NYSTATIN 500000 UNITS: 100000 SUSPENSION ORAL at 22:53

## 2017-01-14 RX ADMIN — LISINOPRIL 2.5 MG: 5 TABLET ORAL at 09:25

## 2017-01-14 RX ADMIN — CARVEDILOL 3.12 MG: 3.12 TABLET, FILM COATED ORAL at 09:24

## 2017-01-14 RX ADMIN — FUROSEMIDE 40 MG: 10 INJECTION, SOLUTION INTRAMUSCULAR; INTRAVENOUS at 09:24

## 2017-01-14 NOTE — PROGRESS NOTES
0730 Received patient in bed sleeping, laying on her left side. Pillow under right side where chest tube is located. Chest tube sight dressing intact. No signs of distress noted. Will continue to monitor    0905 All AM medications given with no adverse reactions. RT in room administering nebulizer treatment. Lung sounds clear. Patient denies any pain or SOB. Some edema noted to bilateral lower legs. New order to remain NPO after midnight Sunday for nuclear stress test in the AM. Patient made aware and educated on reason for test and type of test being performed. 1200 Patient sitting up in bed. Chest tube sight dressing intact. No bleeding noted. Will continue to monitor    1345 Family at bedside. Legs elevated due to edema to both legs. No c/o pain or discomfort. Will continue to monitor. 1816 Patient tolerated all scheduled evening meds. Chest tube remains intact, dressing clean and dry, no bleeding or drainage noted. Denies any pain or discomfort. Will continue to monitor.   1920 Bedside report given to Bay Farris RN(oncoming nurse)  by Luis Vaca

## 2017-01-14 NOTE — PROGRESS NOTES
Progress Note      Patient: Kenyetta Gordon               Sex: female          DOA: 1/5/2017       YOB: 1945      Age:  70 y.o.        LOS:  LOS: 9 days             CHIEF COMPLAINT:  Pneumonia, CHF    Subjective:     Patient able to go to the bathroom without SOB    Objective:      Visit Vitals    BP 98/54 (BP 1 Location: Right arm, BP Patient Position: At rest)    Pulse 74    Temp 98.2 °F (36.8 °C)    Resp 18    Ht 5' 5\" (1.651 m)    Wt 54.4 kg (120 lb)    SpO2 96%    BMI 19.97 kg/m2       Physical Exam:  Gen:  No distress, no complaint  Lungs:  Rales R>L; Adequate aeration  Heart:  Regular rate and rhythm, no murmurs or gallops  Abdomen:  Soft, non-tender, normal bowel sounds        Lab/Data Reviewed:  BMP:   Lab Results   Component Value Date/Time     01/14/2017 04:25 AM    K 3.1 (L) 01/14/2017 04:25 AM    CL 94 (L) 01/14/2017 04:25 AM    CO2 38 (H) 01/14/2017 04:25 AM    AGAP 9 01/14/2017 04:25 AM    GLU 82 01/14/2017 04:25 AM    BUN 16 01/14/2017 04:25 AM    CREA 0.39 (L) 01/14/2017 04:25 AM    GFRAA >60 01/14/2017 04:25 AM    GFRNA >60 01/14/2017 04:25 AM     CBC:   Lab Results   Component Value Date/Time    WBC 13.6 (H) 01/14/2017 04:25 AM    HGB 12.6 01/14/2017 04:25 AM    HCT 36.2 01/14/2017 04:25 AM     01/14/2017 04:25 AM           Assessment/Plan     Active Problems:    Pneumonia (1/5/2017)      Sepsis ([address] (1/5/2017)    CHF  Cardiomyopathy  Pleural effusion    Plan:  Continue with antibiotics  Monitor respiratory status  For cardiac stress on Monday.

## 2017-01-14 NOTE — PROGRESS NOTES
1954 -- Bedside and Verbal shift change report given to Lolly Ewing, POLLY (oncoming nurse) by Denis Patricio RN (offgoing nurse). Report included the following information SBAR, Procedure Summary, Intake/Output, MAR, Recent Results and Cardiac Rhythm ST. Pt awake in bed no pain concerns. Bed locked and lowered, siderails up x3. Call bell at bedside, will continue to monitor. 0003 -- Shift re-assessment completed, no change in pt condition.       0403 -- Shift re-assessment completed, no change in pt condition.         0709 -- Bedside and Verbal shift change report given to Raffi Bell / Bienvenido Palm RN  (oncoming nurse) by Ty Townsend RN (offgoing nurse) Report included the following information SBAR, Procedure Summary, Intake/Output, MAR, Recent Results. Pt up in bed, no indication of pain or acute distress. Bed locked and lowered, siderails up x3. Call bell at bedside.

## 2017-01-14 NOTE — PROGRESS NOTES
[facility] - Progress Note  Admit Date: 1/5/2017    Assessment:     - Presented with Community Acquired PNA  - E. Coli bacteremia from initial blood culture  - Pleural effusion, s/p thoracentesis on right 8/98/82  - Acute systolic CHF exacerbation, elevated BNP, evidence of volume overload on exam  - New cardiomyopathy with EF 30-35% by echo 1/14/17  - Tobacco use  - COPD  - Macrocytosis    Plan: On ASA, BB, ACEI and lasix IV, continue. Plan pharm nuc on Monday to exclude ischemia given new cardiomyopathy. Subjective:     No new complaints.      Objective:      Patient Vitals for the past 8 hrs:   Temp Pulse Resp BP SpO2   01/14/17 0554 98.1 °F (36.7 °C) 78 18 115/63 95 %   01/14/17 0220 98.4 °F (36.9 °C) 94 18 106/57 97 %             Intake/Output Summary (Last 24 hours) at 01/14/17 0904  Last data filed at 01/13/17 1807   Gross per 24 hour   Intake              360 ml   Output             1100 ml   Net             -740 ml       Physical Exam:  General:  alert, cooperative, no distress, appears stated age  Neck:  nontender  Lungs:  clear to auscultation bilaterally  Heart:  regular rate and rhythm, S1, S2 normal, no murmur, click, rub or gallop  Abdomen:  abdomen is soft without significant tenderness, masses, organomegaly or guarding  Extremities:  extremities normal, atraumatic, no cyanosis or edema    Data Review:     Labs: Results:       Chemistry Recent Labs      01/14/17 0425 01/13/17   0504  01/12/17   1514   GLU  82  137*  124*   NA  141  143  145   K  3.1*  3.8  3.6   CL  94*  99*  103   CO2  38*  34*  36*   BUN  16  17  19*   CREA  0.39*  0.39*  0.57*   CA  8.2*  8.3*  8.6   AGAP  9  10  6   BUCR  41*  44*  33*      CBC w/Diff Recent Labs      01/14/17 0425 01/13/17   0504  01/12/17   1514   WBC  13.6*  14.1*  14.2*   RBC  3.15*  3.39*  3.19*   HGB  12.6  13.3  12.8   HCT  36.2  39.4  37.2   PLT  297  288  282   GRANS  90*  97*  88*   LYMPH  5*  2*  7*   EOS  0  0  0 Cardiac Enzymes No results found for: CPK, CKMMB, CKMB, RCK3, CKMBT, CKNDX, CKND1, ESTEFANY, TROPT, TROIQ, TEZ, TROPT, TNIPOC, BNP, BNPP   Coagulation No results for input(s): PTP, INR, APTT in the last 72 hours. No lab exists for component: INREXT    Lipid Panel No results found for: CHOL, CHOLPOCT, CHOLX, CHLST, CHOLV, 630114, HDL, LDL, NLDLCT, DLDL, LDLC, DLDLP, 612206, VLDLC, VLDL, TGL, TGLX, TRIGL, [id number], TRIGP, TGLPOCT, 517120, CHHD, CHHDX   BNP No results found for: BNP, BNPP, XBNPT   Liver Enzymes No results for input(s): TP, ALB, TBIL, AP, SGOT, GPT in the last 72 hours.     No lab exists for component: DBIL   Digoxin    Thyroid Studies Lab Results   Component Value Date/Time    TSH 1.03 01/11/2017 04:35 AM          Signed By: Annita Solis MD     January 14, 2017

## 2017-01-15 ENCOUNTER — APPOINTMENT (OUTPATIENT)
Dept: GENERAL RADIOLOGY | Age: 72
DRG: 871 | End: 2017-01-15
Attending: PHYSICIAN ASSISTANT
Payer: MEDICARE

## 2017-01-15 LAB
ANION GAP BLD CALC-SCNC: 9 MMOL/L (ref 3–18)
BACTERIA SPEC CULT: NORMAL
BASOPHILS # BLD AUTO: 0 K/UL (ref 0–0.06)
BASOPHILS # BLD: 0 % (ref 0–2)
BUN SERPL-MCNC: 17 MG/DL (ref 7–18)
BUN/CREAT SERPL: 46 (ref 12–20)
CALCIUM SERPL-MCNC: 7.7 MG/DL (ref 8.5–10.1)
CHLORIDE SERPL-SCNC: 94 MMOL/L (ref 100–108)
CO2 SERPL-SCNC: 39 MMOL/L (ref 21–32)
CREAT SERPL-MCNC: 0.37 MG/DL (ref 0.6–1.3)
DIFFERENTIAL METHOD BLD: ABNORMAL
EOSINOPHIL # BLD: 0 K/UL (ref 0–0.4)
EOSINOPHIL NFR BLD: 0 % (ref 0–5)
ERYTHROCYTE [DISTWIDTH] IN BLOOD BY AUTOMATED COUNT: 12.5 % (ref 11.6–14.5)
GLUCOSE SERPL-MCNC: 98 MG/DL (ref 74–99)
HCT VFR BLD AUTO: 35.2 % (ref 35–45)
HGB BLD-MCNC: 12.1 G/DL (ref 12–16)
LYMPHOCYTES # BLD AUTO: 10 % (ref 21–52)
LYMPHOCYTES # BLD: 0.9 K/UL (ref 0.9–3.6)
MAGNESIUM SERPL-MCNC: 1.2 MG/DL (ref 1.8–2.4)
MCH RBC QN AUTO: 39.4 PG (ref 24–34)
MCHC RBC AUTO-ENTMCNC: 34.4 G/DL (ref 31–37)
MCV RBC AUTO: 114.7 FL (ref 74–97)
MONOCYTES # BLD: 0.7 K/UL (ref 0.05–1.2)
MONOCYTES NFR BLD AUTO: 7 % (ref 3–10)
NEUTS SEG # BLD: 8.2 K/UL (ref 1.8–8)
NEUTS SEG NFR BLD AUTO: 83 % (ref 40–73)
PLATELET # BLD AUTO: 306 K/UL (ref 135–420)
PMV BLD AUTO: 9.8 FL (ref 9.2–11.8)
POTASSIUM SERPL-SCNC: 2.6 MMOL/L (ref 3.5–5.5)
RBC # BLD AUTO: 3.07 M/UL (ref 4.2–5.3)
SERVICE CMNT-IMP: NORMAL
SODIUM SERPL-SCNC: 142 MMOL/L (ref 136–145)
WBC # BLD AUTO: 9.9 K/UL (ref 4.6–13.2)

## 2017-01-15 PROCEDURE — 74011250637 HC RX REV CODE- 250/637: Performed by: INTERNAL MEDICINE

## 2017-01-15 PROCEDURE — 74011250637 HC RX REV CODE- 250/637: Performed by: HOSPITALIST

## 2017-01-15 PROCEDURE — 85025 COMPLETE CBC W/AUTO DIFF WBC: CPT | Performed by: NURSE PRACTITIONER

## 2017-01-15 PROCEDURE — 94640 AIRWAY INHALATION TREATMENT: CPT

## 2017-01-15 PROCEDURE — 94760 N-INVAS EAR/PLS OXIMETRY 1: CPT

## 2017-01-15 PROCEDURE — 74011000250 HC RX REV CODE- 250: Performed by: FAMILY MEDICINE

## 2017-01-15 PROCEDURE — 74011250636 HC RX REV CODE- 250/636: Performed by: FAMILY MEDICINE

## 2017-01-15 PROCEDURE — 74011250637 HC RX REV CODE- 250/637: Performed by: PHYSICIAN ASSISTANT

## 2017-01-15 PROCEDURE — 74011250636 HC RX REV CODE- 250/636: Performed by: HOSPITALIST

## 2017-01-15 PROCEDURE — 83735 ASSAY OF MAGNESIUM: CPT | Performed by: HOSPITALIST

## 2017-01-15 PROCEDURE — 77010033678 HC OXYGEN DAILY

## 2017-01-15 PROCEDURE — 74011636637 HC RX REV CODE- 636/637: Performed by: INTERNAL MEDICINE

## 2017-01-15 PROCEDURE — 36415 COLL VENOUS BLD VENIPUNCTURE: CPT | Performed by: NURSE PRACTITIONER

## 2017-01-15 PROCEDURE — 65660000000 HC RM CCU STEPDOWN

## 2017-01-15 PROCEDURE — 71010 XR CHEST PORT: CPT

## 2017-01-15 PROCEDURE — 80048 BASIC METABOLIC PNL TOTAL CA: CPT | Performed by: NURSE PRACTITIONER

## 2017-01-15 RX ORDER — POTASSIUM CHLORIDE 7.45 MG/ML
10 INJECTION INTRAVENOUS
Status: COMPLETED | OUTPATIENT
Start: 2017-01-15 | End: 2017-01-15

## 2017-01-15 RX ORDER — POTASSIUM CHLORIDE 1500 MG/1
20 TABLET, FILM COATED, EXTENDED RELEASE ORAL
Status: COMPLETED | OUTPATIENT
Start: 2017-01-15 | End: 2017-01-15

## 2017-01-15 RX ORDER — FUROSEMIDE 20 MG/1
20 TABLET ORAL 2 TIMES DAILY
Status: DISCONTINUED | OUTPATIENT
Start: 2017-01-15 | End: 2017-01-17 | Stop reason: HOSPADM

## 2017-01-15 RX ADMIN — FUROSEMIDE 20 MG: 20 TABLET ORAL at 11:16

## 2017-01-15 RX ADMIN — POTASSIUM CHLORIDE 20 MEQ: 20 TABLET, EXTENDED RELEASE ORAL at 12:41

## 2017-01-15 RX ADMIN — CARVEDILOL 3.12 MG: 3.12 TABLET, FILM COATED ORAL at 17:44

## 2017-01-15 RX ADMIN — PREDNISONE 30 MG: 5 TABLET ORAL at 09:07

## 2017-01-15 RX ADMIN — POTASSIUM CHLORIDE 20 MEQ: 20 TABLET, EXTENDED RELEASE ORAL at 09:07

## 2017-01-15 RX ADMIN — LEVOFLOXACIN 500 MG: 500 TABLET, FILM COATED ORAL at 17:44

## 2017-01-15 RX ADMIN — POTASSIUM CHLORIDE 20 MEQ: 20 TABLET, EXTENDED RELEASE ORAL at 11:19

## 2017-01-15 RX ADMIN — IPRATROPIUM BROMIDE AND ALBUTEROL SULFATE 3 ML: .5; 3 SOLUTION RESPIRATORY (INHALATION) at 14:36

## 2017-01-15 RX ADMIN — IPRATROPIUM BROMIDE AND ALBUTEROL SULFATE 3 ML: .5; 3 SOLUTION RESPIRATORY (INHALATION) at 21:06

## 2017-01-15 RX ADMIN — NYSTATIN 500000 UNITS: 100000 SUSPENSION ORAL at 22:33

## 2017-01-15 RX ADMIN — ENOXAPARIN SODIUM 40 MG: 40 INJECTION SUBCUTANEOUS at 22:33

## 2017-01-15 RX ADMIN — CARVEDILOL 3.12 MG: 3.12 TABLET, FILM COATED ORAL at 09:07

## 2017-01-15 RX ADMIN — NYSTATIN 500000 UNITS: 100000 SUSPENSION ORAL at 12:42

## 2017-01-15 RX ADMIN — FUROSEMIDE 20 MG: 20 TABLET ORAL at 17:44

## 2017-01-15 RX ADMIN — MICONAZOLE NITRATE 1 APPLICATOR: 20 CREAM VAGINAL at 17:46

## 2017-01-15 RX ADMIN — PANTOPRAZOLE SODIUM 40 MG: 40 TABLET, DELAYED RELEASE ORAL at 06:10

## 2017-01-15 RX ADMIN — NYSTATIN 500000 UNITS: 100000 SUSPENSION ORAL at 17:44

## 2017-01-15 RX ADMIN — IPRATROPIUM BROMIDE AND ALBUTEROL SULFATE 3 ML: .5; 3 SOLUTION RESPIRATORY (INHALATION) at 01:45

## 2017-01-15 RX ADMIN — LISINOPRIL 2.5 MG: 5 TABLET ORAL at 09:08

## 2017-01-15 RX ADMIN — NYSTATIN 500000 UNITS: 100000 SUSPENSION ORAL at 09:08

## 2017-01-15 RX ADMIN — POTASSIUM CHLORIDE 10 MEQ: 7.46 INJECTION, SOLUTION INTRAVENOUS at 06:11

## 2017-01-15 RX ADMIN — POTASSIUM CHLORIDE 10 MEQ: 7.46 INJECTION, SOLUTION INTRAVENOUS at 07:35

## 2017-01-15 RX ADMIN — ASPIRIN 81 MG 81 MG: 81 TABLET ORAL at 09:08

## 2017-01-15 RX ADMIN — IPRATROPIUM BROMIDE AND ALBUTEROL SULFATE 3 ML: .5; 3 SOLUTION RESPIRATORY (INHALATION) at 09:19

## 2017-01-15 NOTE — PROGRESS NOTES
Progress Note      Patient: Jocelyn Vaughan               Sex: female          DOA: 1/5/2017       YOB: 1945      Age:  70 y.o.        LOS:  LOS: 10 days             CHIEF COMPLAINT:  Pneumonia, CHF, Pleural effusion    Subjective:     Awake and interacting  No distress    Objective:      Visit Vitals    /62 (BP 1 Location: Right arm, BP Patient Position: At rest)    Pulse 88    Temp 98.1 °F (36.7 °C)    Resp 18    Ht 5' 5\" (1.651 m)    Wt 54.4 kg (120 lb)    SpO2 92%    BMI 19.97 kg/m2       Physical Exam:  Gen:  No distress, no complaint  Lungs:  Clear bilaterally, no wheeze or rhonchi  Heart:  Regular rate and rhythm, no murmurs or gallops  Abdomen:  Soft, non-tender, normal bowel sounds        Lab/Data Reviewed:  BMP:   Lab Results   Component Value Date/Time     01/15/2017 04:12 AM    K 2.6 (LL) 01/15/2017 04:12 AM    CL 94 (L) 01/15/2017 04:12 AM    CO2 39 (H) 01/15/2017 04:12 AM    AGAP 9 01/15/2017 04:12 AM    GLU 98 01/15/2017 04:12 AM    BUN 17 01/15/2017 04:12 AM    CREA 0.37 (L) 01/15/2017 04:12 AM    GFRAA >60 01/15/2017 04:12 AM    GFRNA >60 01/15/2017 04:12 AM     CBC:   Lab Results   Component Value Date/Time    WBC 9.9 01/15/2017 04:12 AM    HGB 12.1 01/15/2017 04:12 AM    HCT 35.2 01/15/2017 04:12 AM     01/15/2017 04:12 AM           Assessment/Plan     Active Problems:    Pneumonia (1/5/2017)      Sepsis ([address] (1/5/2017)    Pleural effusion  CHF  Hypokalemia    Plan:  Continue with antibiotics  Replace potassium  Follow pleural effusion  For stress testing.

## 2017-01-15 NOTE — PROGRESS NOTES
0730 Patient in bed sleeping during AM shift change. On second bag of 2 of 2 Potassium for 2.6 potassium level. Patient denied any discomfort or weakness when asked. Will clarify lasix order with doctor this AM. Will continue to monitor. 1025 Patient concerned about when her chest tube will be drained and/or discontinued. Spoke with doctor. They will be making rounds this AM. Patient made aware. Will continue to monitor. 1200 Family at bedside.  informed about change in patients care regarding potassium, chest x-ray, and change in lasix route. Will continue to follow plan of care    1407 Patient resting in bed. Legs elevated to help decrease trace edema in the legs and ankles. Will continue to monitor. 1805 Evening medications given. Tolerated well. Denies any pain or discomfort. Will continue to monitor.       1900 Bedside report given to oncoming nurse by Prasanna Tanner RN(offgoing nurse)

## 2017-01-15 NOTE — PROGRESS NOTES
Shift Progress note:   Assumed care of patient in bed alert, no complaints offered. Uneventful night. Receiving 2 IV doses of potasium due to 2.6 level this, otherwise no change in treatment. Call bell within reach.

## 2017-01-15 NOTE — ROUTINE PROCESS
Bedside and Verbal shift change report given to Imelda RN (oncoming nurse) by Yael Lopez RN   (offgoing nurse). Report included the following information SBAR, Kardex, MAR and Recent Results.

## 2017-01-15 NOTE — PROGRESS NOTES
[facility] - Progress Note  Admit Date: 1/5/2017    Assessment:     - Presented with Community Acquired PNA  - E. Coli bacteremia from initial blood culture  - Pleural effusion, s/p thoracentesis on right 6/63/76  - Acute systolic CHF exacerbation, elevated BNP, evidence of volume overload on exam  - New cardiomyopathy with EF 30-35% by echo 1/14/17  - Tobacco use  - COPD  - Macrocytosis    Plan:     I will switch to oral lasix today, plan pharm nuc tomorrow for new cardiomyopathy. Discussed with patient. Subjective:     No new complaints.      Objective:      Patient Vitals for the past 8 hrs:   Temp Pulse Resp BP SpO2   01/15/17 0536 98.1 °F (36.7 °C) 88 18 112/62 92 %       Intake/Output Summary (Last 24 hours) at 01/15/17 1009  Last data filed at 01/14/17 1532   Gross per 24 hour   Intake              120 ml   Output             1100 ml   Net             -980 ml       Physical Exam:  General:  alert, cooperative, no distress, appears stated age  Neck:  nontender  Lungs:  clear to auscultation bilaterally  Heart:  regular rate and rhythm, S1, S2 normal, no murmur, click, rub or gallop  Abdomen:  abdomen is soft without significant tenderness, masses, organomegaly or guarding  Extremities:  extremities normal, atraumatic, no cyanosis or edema    Data Review:     Labs: Results:       Chemistry Recent Labs      01/15/17   0412  01/14/17   0425  01/13/17   0504   GLU  98  82  137*   NA  142  141  143   K  2.6*  3.1*  3.8   CL  94*  94*  99*   CO2  39*  38*  34*   BUN  17  16  17   CREA  0.37*  0.39*  0.39*   CA  7.7*  8.2*  8.3*   MG  1.2*   --    --    AGAP  9  9  10   BUCR  46*  41*  44*      CBC w/Diff Recent Labs      01/15/17   0412  01/14/17   0425  01/13/17   0504   WBC  9.9  13.6*  14.1*   RBC  3.07*  3.15*  3.39*   HGB  12.1  12.6  13.3   HCT  35.2  36.2  39.4   PLT  306  297  288   GRANS  83*  90*  97*   LYMPH  10*  5*  2*   EOS  0  0  0      Cardiac Enzymes No results found for: CPK, CKMMB, CKMB, RCK3, CKMBT, CKNDX, CKND1, ESTEFANY, TROPT, TROIQ, TEZ, TROPT, TNIPOC, BNP, BNPP   Coagulation No results for input(s): PTP, INR, APTT in the last 72 hours. No lab exists for component: INREXT    Lipid Panel No results found for: CHOL, CHOLPOCT, CHOLX, CHLST, CHOLV, 056112, HDL, LDL, NLDLCT, DLDL, LDLC, DLDLP, 282008, VLDLC, VLDL, TGL, TGLX, TRIGL, [id number], TRIGP, TGLPOCT, 022451, CHHD, CHHDX   BNP No results found for: BNP, BNPP, XBNPT   Liver Enzymes No results for input(s): TP, ALB, TBIL, AP, SGOT, GPT in the last 72 hours.     No lab exists for component: DBIL   Digoxin    Thyroid Studies Lab Results   Component Value Date/Time    TSH 1.03 01/11/2017 04:35 AM          Signed By: Gregorio Villa MD     January 15, 2017

## 2017-01-16 ENCOUNTER — APPOINTMENT (OUTPATIENT)
Dept: NUCLEAR MEDICINE | Age: 72
DRG: 871 | End: 2017-01-16
Attending: INTERNAL MEDICINE
Payer: MEDICARE

## 2017-01-16 LAB
ALBUMIN SERPL BCP-MCNC: 2.4 G/DL (ref 3.4–5)
ALBUMIN/GLOB SERPL: 1 {RATIO} (ref 0.8–1.7)
ALP SERPL-CCNC: 100 U/L (ref 45–117)
ALT SERPL-CCNC: 64 U/L (ref 13–56)
ANION GAP BLD CALC-SCNC: 8 MMOL/L (ref 3–18)
AST SERPL W P-5'-P-CCNC: 55 U/L (ref 15–37)
BASOPHILS # BLD AUTO: 0 K/UL (ref 0–0.06)
BASOPHILS # BLD: 0 % (ref 0–2)
BILIRUB DIRECT SERPL-MCNC: 0.1 MG/DL (ref 0–0.2)
BILIRUB SERPL-MCNC: 0.4 MG/DL (ref 0.2–1)
BUN SERPL-MCNC: 16 MG/DL (ref 7–18)
BUN/CREAT SERPL: 40 (ref 12–20)
CALCIUM SERPL-MCNC: 8.2 MG/DL (ref 8.5–10.1)
CHLORIDE SERPL-SCNC: 96 MMOL/L (ref 100–108)
CO2 SERPL-SCNC: 38 MMOL/L (ref 21–32)
CREAT SERPL-MCNC: 0.4 MG/DL (ref 0.6–1.3)
DIFFERENTIAL METHOD BLD: ABNORMAL
EOSINOPHIL # BLD: 0 K/UL (ref 0–0.4)
EOSINOPHIL NFR BLD: 0 % (ref 0–5)
ERYTHROCYTE [DISTWIDTH] IN BLOOD BY AUTOMATED COUNT: 12.6 % (ref 11.6–14.5)
GLOBULIN SER CALC-MCNC: 2.5 G/DL (ref 2–4)
GLUCOSE SERPL-MCNC: 82 MG/DL (ref 74–99)
HCT VFR BLD AUTO: 35.2 % (ref 35–45)
HGB BLD-MCNC: 12.1 G/DL (ref 12–16)
LYMPHOCYTES # BLD AUTO: 9 % (ref 21–52)
LYMPHOCYTES # BLD: 1 K/UL (ref 0.9–3.6)
MAGNESIUM SERPL-MCNC: 1.3 MG/DL (ref 1.8–2.4)
MCH RBC QN AUTO: 39.9 PG (ref 24–34)
MCHC RBC AUTO-ENTMCNC: 34.4 G/DL (ref 31–37)
MCV RBC AUTO: 116.2 FL (ref 74–97)
MONOCYTES # BLD: 0.9 K/UL (ref 0.05–1.2)
MONOCYTES NFR BLD AUTO: 8 % (ref 3–10)
NEUTS SEG # BLD: 8.9 K/UL (ref 1.8–8)
NEUTS SEG NFR BLD AUTO: 83 % (ref 40–73)
PLATELET # BLD AUTO: 319 K/UL (ref 135–420)
PMV BLD AUTO: 9.7 FL (ref 9.2–11.8)
POTASSIUM SERPL-SCNC: 3.2 MMOL/L (ref 3.5–5.5)
PROT SERPL-MCNC: 4.9 G/DL (ref 6.4–8.2)
RBC # BLD AUTO: 3.03 M/UL (ref 4.2–5.3)
SODIUM SERPL-SCNC: 142 MMOL/L (ref 136–145)
WBC # BLD AUTO: 10.8 K/UL (ref 4.6–13.2)

## 2017-01-16 PROCEDURE — 74011250636 HC RX REV CODE- 250/636: Performed by: INTERNAL MEDICINE

## 2017-01-16 PROCEDURE — 36415 COLL VENOUS BLD VENIPUNCTURE: CPT | Performed by: HOSPITALIST

## 2017-01-16 PROCEDURE — 74011636637 HC RX REV CODE- 636/637: Performed by: INTERNAL MEDICINE

## 2017-01-16 PROCEDURE — 74011250636 HC RX REV CODE- 250/636: Performed by: NURSE PRACTITIONER

## 2017-01-16 PROCEDURE — 83735 ASSAY OF MAGNESIUM: CPT | Performed by: HOSPITALIST

## 2017-01-16 PROCEDURE — 74011250637 HC RX REV CODE- 250/637: Performed by: PHYSICIAN ASSISTANT

## 2017-01-16 PROCEDURE — 80048 BASIC METABOLIC PNL TOTAL CA: CPT | Performed by: HOSPITALIST

## 2017-01-16 PROCEDURE — 74011250637 HC RX REV CODE- 250/637: Performed by: INTERNAL MEDICINE

## 2017-01-16 PROCEDURE — 65660000000 HC RM CCU STEPDOWN

## 2017-01-16 PROCEDURE — 80076 HEPATIC FUNCTION PANEL: CPT | Performed by: INTERNAL MEDICINE

## 2017-01-16 PROCEDURE — 77010033678 HC OXYGEN DAILY

## 2017-01-16 PROCEDURE — 94640 AIRWAY INHALATION TREATMENT: CPT

## 2017-01-16 PROCEDURE — 74011000250 HC RX REV CODE- 250: Performed by: INTERNAL MEDICINE

## 2017-01-16 PROCEDURE — 74011250637 HC RX REV CODE- 250/637: Performed by: FAMILY MEDICINE

## 2017-01-16 PROCEDURE — A9500 TC99M SESTAMIBI: HCPCS

## 2017-01-16 PROCEDURE — 74011000250 HC RX REV CODE- 250: Performed by: FAMILY MEDICINE

## 2017-01-16 PROCEDURE — 93017 CV STRESS TEST TRACING ONLY: CPT | Performed by: INTERNAL MEDICINE

## 2017-01-16 PROCEDURE — 74011250637 HC RX REV CODE- 250/637: Performed by: HOSPITALIST

## 2017-01-16 PROCEDURE — 74011250637 HC RX REV CODE- 250/637: Performed by: NURSE PRACTITIONER

## 2017-01-16 PROCEDURE — 85025 COMPLETE CBC W/AUTO DIFF WBC: CPT | Performed by: HOSPITALIST

## 2017-01-16 PROCEDURE — 74011250636 HC RX REV CODE- 250/636: Performed by: FAMILY MEDICINE

## 2017-01-16 RX ORDER — ARFORMOTEROL TARTRATE 15 UG/2ML
15 SOLUTION RESPIRATORY (INHALATION)
Status: DISCONTINUED | OUTPATIENT
Start: 2017-01-16 | End: 2017-01-17 | Stop reason: HOSPADM

## 2017-01-16 RX ORDER — FUROSEMIDE 10 MG/ML
40 INJECTION INTRAMUSCULAR; INTRAVENOUS ONCE
Status: COMPLETED | OUTPATIENT
Start: 2017-01-16 | End: 2017-01-16

## 2017-01-16 RX ORDER — POTASSIUM CHLORIDE 20 MEQ/1
20 TABLET, EXTENDED RELEASE ORAL 2 TIMES DAILY
Status: DISCONTINUED | OUTPATIENT
Start: 2017-01-16 | End: 2017-01-17 | Stop reason: HOSPADM

## 2017-01-16 RX ORDER — BUDESONIDE AND FORMOTEROL FUMARATE DIHYDRATE 160; 4.5 UG/1; UG/1
2 AEROSOL RESPIRATORY (INHALATION) 2 TIMES DAILY
Status: DISCONTINUED | OUTPATIENT
Start: 2017-01-16 | End: 2017-01-16 | Stop reason: CLARIF

## 2017-01-16 RX ORDER — PREDNISONE 20 MG/1
20 TABLET ORAL
Status: DISCONTINUED | OUTPATIENT
Start: 2017-01-17 | End: 2017-01-17 | Stop reason: HOSPADM

## 2017-01-16 RX ORDER — MAGNESIUM SULFATE HEPTAHYDRATE 500 MG/ML
2 INJECTION, SOLUTION INTRAMUSCULAR; INTRAVENOUS
Status: DISCONTINUED | OUTPATIENT
Start: 2017-01-16 | End: 2017-01-16 | Stop reason: CLARIF

## 2017-01-16 RX ORDER — MAGNESIUM SULFATE HEPTAHYDRATE 40 MG/ML
2 INJECTION, SOLUTION INTRAVENOUS ONCE
Status: COMPLETED | OUTPATIENT
Start: 2017-01-16 | End: 2017-01-16

## 2017-01-16 RX ORDER — BUDESONIDE 0.5 MG/2ML
500 INHALANT ORAL
Status: DISCONTINUED | OUTPATIENT
Start: 2017-01-16 | End: 2017-01-17 | Stop reason: HOSPADM

## 2017-01-16 RX ADMIN — POTASSIUM CHLORIDE 20 MEQ: 20 TABLET, EXTENDED RELEASE ORAL at 18:47

## 2017-01-16 RX ADMIN — FUROSEMIDE 40 MG: 10 INJECTION, SOLUTION INTRAMUSCULAR; INTRAVENOUS at 18:49

## 2017-01-16 RX ADMIN — BUDESONIDE 500 MCG: 0.5 SUSPENSION RESPIRATORY (INHALATION) at 20:49

## 2017-01-16 RX ADMIN — POTASSIUM CHLORIDE 20 MEQ: 20 TABLET, EXTENDED RELEASE ORAL at 10:36

## 2017-01-16 RX ADMIN — PREDNISONE 30 MG: 5 TABLET ORAL at 10:36

## 2017-01-16 RX ADMIN — NYSTATIN 500000 UNITS: 100000 SUSPENSION ORAL at 14:52

## 2017-01-16 RX ADMIN — NYSTATIN 500000 UNITS: 100000 SUSPENSION ORAL at 10:38

## 2017-01-16 RX ADMIN — ASPIRIN 81 MG 81 MG: 81 TABLET ORAL at 10:36

## 2017-01-16 RX ADMIN — FUROSEMIDE 20 MG: 20 TABLET ORAL at 10:36

## 2017-01-16 RX ADMIN — ACETAMINOPHEN 650 MG: 325 TABLET, FILM COATED ORAL at 10:31

## 2017-01-16 RX ADMIN — POTASSIUM CHLORIDE 20 MEQ: 20 TABLET, EXTENDED RELEASE ORAL at 14:52

## 2017-01-16 RX ADMIN — REGADENOSON 0.4 MG: 0.08 INJECTION, SOLUTION INTRAVENOUS at 09:07

## 2017-01-16 RX ADMIN — NYSTATIN 500000 UNITS: 100000 SUSPENSION ORAL at 18:47

## 2017-01-16 RX ADMIN — CARVEDILOL 3.12 MG: 3.12 TABLET, FILM COATED ORAL at 10:36

## 2017-01-16 RX ADMIN — MICONAZOLE NITRATE 1 APPLICATOR: 20 CREAM VAGINAL at 20:44

## 2017-01-16 RX ADMIN — MAGNESIUM SULFATE HEPTAHYDRATE 2 G: 40 INJECTION, SOLUTION INTRAVENOUS at 14:52

## 2017-01-16 RX ADMIN — LEVOFLOXACIN 500 MG: 500 TABLET, FILM COATED ORAL at 18:47

## 2017-01-16 RX ADMIN — ARFORMOTEROL TARTRATE 15 MCG: 15 SOLUTION RESPIRATORY (INHALATION) at 20:50

## 2017-01-16 RX ADMIN — LISINOPRIL 2.5 MG: 5 TABLET ORAL at 10:36

## 2017-01-16 RX ADMIN — CARVEDILOL 3.12 MG: 3.12 TABLET, FILM COATED ORAL at 18:48

## 2017-01-16 RX ADMIN — IPRATROPIUM BROMIDE AND ALBUTEROL SULFATE 3 ML: .5; 3 SOLUTION RESPIRATORY (INHALATION) at 14:05

## 2017-01-16 RX ADMIN — PANTOPRAZOLE SODIUM 40 MG: 40 TABLET, DELAYED RELEASE ORAL at 10:36

## 2017-01-16 RX ADMIN — NYSTATIN 500000 UNITS: 100000 SUSPENSION ORAL at 21:38

## 2017-01-16 RX ADMIN — ENOXAPARIN SODIUM 40 MG: 40 INJECTION SUBCUTANEOUS at 21:38

## 2017-01-16 RX ADMIN — FUROSEMIDE 20 MG: 20 TABLET ORAL at 18:49

## 2017-01-16 RX ADMIN — IPRATROPIUM BROMIDE AND ALBUTEROL SULFATE 3 ML: .5; 3 SOLUTION RESPIRATORY (INHALATION) at 20:49

## 2017-01-16 NOTE — PROGRESS NOTES
[facility]  Hospitalist Division        Inpatient Daily Progress Note    Daily progress Note    Patient: Julio Vines MRN: [id number]  CSN: [id number]    YOB: 1945  Age: 70 y.o. Sex: female    DOA: 1/5/2017 LOS:  LOS: 11 days                    Chief Complaint:  Shortness of breath       Subjective:      Sitting edge of bed, eating lunch. Denies chest pain or shortness of breath. In NAD. Objective:      Visit Vitals    BP 90/47 (BP 1 Location: Right arm, BP Patient Position: At rest)    Pulse 83    Temp 98.2 °F (36.8 °C)    Resp 16    Ht 5' 5\" (1.651 m)    Wt 54.4 kg (120 lb)    SpO2 94%    BMI 19.97 kg/m2           Physical Exam:  General appearance: alert and oriented, cooperative, appears stated age  Head: Normocephalic, without obvious abnormality, atraumatic  Lungs: clear left lung field, fine crackles to RLL otherwise clear. Right chest tube clamped   Heart: regular rate and rhythm, S1, S2 normal, no murmur, click, rub or gallop  Abdomen: soft, non tender, non distended. Normoactive bowel sounds   Extremities: extremities normal, atraumatic, no cyanosis. 2+ BLE edema   Skin: Skin color, texture, turgor normal. No rashes or lesions.   Neurologic: grossly normal   PSY: Mood and affect normal, appropriately behaved        Intake and Output:  Current Shift:     Last three shifts:  01/14 1901 - 01/16 0700  In: 440 [P.O.:440]  Out: -     Recent Results (from the past 24 hour(s))   CBC WITH AUTOMATED DIFF    Collection Time: 01/16/17  4:35 AM   Result Value Ref Range    WBC 10.8 4.6 - 13.2 K/uL    RBC 3.03 (L) 4.20 - 5.30 M/uL    HGB 12.1 12.0 - 16.0 g/dL    HCT 35.2 35.0 - 45.0 %    .2 (H) 74.0 - 97.0 FL    MCH 39.9 (H) 24.0 - 34.0 PG    MCHC 34.4 31.0 - 37.0 g/dL    RDW 12.6 11.6 - 14.5 %    PLATELET 946 277 - 555 K/uL    MPV 9.7 9.2 - 11.8 FL    NEUTROPHILS 83 (H) 40 - 73 %    LYMPHOCYTES 9 (L) 21 - 52 %    MONOCYTES 8 3 - 10 %    EOSINOPHILS 0 0 - 5 %    BASOPHILS 0 0 - 2 %    ABS. NEUTROPHILS 8.9 (H) 1.8 - 8.0 K/UL    ABS. LYMPHOCYTES 1.0 0.9 - 3.6 K/UL    ABS. MONOCYTES 0.9 0.05 - 1.2 K/UL    ABS. EOSINOPHILS 0.0 0.0 - 0.4 K/UL    ABS. BASOPHILS 0.0 0.0 - 0.06 K/UL    DF AUTOMATED     METABOLIC PANEL, BASIC    Collection Time: 01/16/17  4:35 AM   Result Value Ref Range    Sodium 142 136 - 145 mmol/L    Potassium 3.2 (L) 3.5 - 5.5 mmol/L    Chloride 96 (L) 100 - 108 mmol/L    CO2 38 (H) 21 - 32 mmol/L    Anion gap 8 3.0 - 18 mmol/L    Glucose 82 74 - 99 mg/dL    BUN 16 7.0 - 18 MG/DL    Creatinine 0.40 (L) 0.6 - 1.3 MG/DL    BUN/Creatinine ratio 40 (H) 12 - 20      GFR est AA >60 >60 ml/min/1.73m2    GFR est non-AA >60 >60 ml/min/1.73m2    Calcium 8.2 (L) 8.5 - 10.1 MG/DL   MAGNESIUM    Collection Time: 01/16/17  4:35 AM   Result Value Ref Range    Magnesium 1.3 (L) 1.8 - 2.4 mg/dL           Lab Results   Component Value Date/Time    Glucose 82 01/16/2017 04:35 AM    Glucose 98 01/15/2017 04:12 AM    Glucose 82 01/14/2017 04:25 AM    Glucose 137 01/13/2017 05:04 AM    Glucose 124 01/12/2017 03:14 PM        Assessment/Plan:     Patient Active Problem List   Diagnosis Code    Pneumonia J18.9    Sepsis ([facility] [address] A41.9       A/P:  CAP: continue Levaquin, Vancomycin. Wean O2 as tolerated  S/p thoracentesis and right chest tube placement 1/13/17: chest tube clamped. Pulmonology following- appreciate assistance  Acute systolic CHF exacerbation: continue Lasix. Coreg, Lisinopril. Cardiology following- NM stress test completed today- report pending  COPD: continue Duo-nebs, Brovana, Pulmicort, Prednisone   Hypomagnesemia: replete. Check in am  Hypokalemia: replete.  Check BMP in AM  GI prophylaxis: Protonix   DVT prophylaxis: Lovenox    Continue PT   Dispo: home soon       Raad Snow, LE-BC  [facility]  Pager:  [phone]  Office:  [phone]      I examined the patient , reviewed the history, labs, and radiology reports  independently. I have reviewed the NP documentation, agree with the documentation, medical decision making and treatment plan as outlined by my NP.   Stress test negative for ischemia   D/w pulm, will remove chest tube today  Repeat CXR  Possible DC in AM       Jens Uriostegui MD

## 2017-01-16 NOTE — PROGRESS NOTES
1905 - Report received from [facility], RN. Orders, medications, and labs reviewed. 0000 - Pt NPO at midnight. Drinks removed from pt's bedside table and pt reminded of NPO status for stress test in AM.   8081 - Report given to [facility], [facility]. Orders, medications, and labs reviewed.

## 2017-01-16 NOTE — ROUTINE PROCESS
Bedside and Verbal shift change report given to [facility] (oncoming nurse) by Vi Ro (offgoing nurse). Report included the following information SBAR, Kardex, Intake/Output, MAR, Recent Results and Cardiac Rhythm Normal Sinus Rhythm. 0740 Pt transported for Stress Test.     1010 Pt back in room. 1230 Pt resting in bed with no change to condition and no complaints of pain. 80 Pt's chest tube removed by Dr. Maria Esther Bethea from posterior right lung field. No complications. Pt tolerated procedure well. Dressing with vaseline gauze, 4x4's and silk tape placed. 1630 Dressing saturated with drainage. Consulted with Dr. Maria Esther Bethea, some drainage is expected, reinforce with additional 4x4's as needed. Bedside and Verbal shift change report given to [address] [facility] (oncoming nurse) by [address] [facility] (offgoing nurse). Report included the following information SBAR, Kardex, Procedure Summary, Intake/Output, MAR, Recent Results and Cardiac Rhythm Normal Sinus Rhythm. 1945 Posterior dressing change and reinforced.

## 2017-01-16 NOTE — PROGRESS NOTES
[place] M.D. Pulmonary Critical Care & Sleep Medicine       Follow up     Name: Winifred Marie MRN: [id number]   : 1945 Hospital: [facility]   Date: 2017        IMPRESSION:   Patient Active Problem List   Diagnosis Code    Pneumonia J18.9    Sepsis ([facility] [address] A41.9 ·   Bilateral effusion possible transudate   · Possible COPD ex smoker  · Cardiomyopathy< Stress test negative and EF better on stress test>  · S/P chest tube placement for unknown reason< Do  Not see rationale on transudative effusion to have chest tubes>  · Tube was clamped since 2 days no pneumo or worsening effusion noted      RECOMMENDATIONS: Tube is clamped and not in any distress to be drained again  Will remove chest tube  Possible transudate effusion Get Serum LDH and Protein to evaluate further  Give additional dose of lasix  Repeat Xray in am  Taper and DC oxygen  Smoking cessation counseling done  Was on symbicort at home will resume it here  Not sure on role of steroids will taper it   Out patient PFTS  Will follow       Addendum: Chest tube removed without difficulty  As per RN after few min noted to have some fluid coming out through track most likely residual fluid coming out as tube was clamped for 2 days <Not sure why> monitor with gauge and re inforcement and keeping in negative balance   · Pleural fluid culture is negative  · Unfortunately Pleural fluid cytology was never sent. · Prior/Old records reviewed and discussed with patient. · Labs, Images and available PFT and sleep study discussed with patient. Labs and images personally seen and available reports reviewed with patient. · All current medicines are reviewed and doses and prescription adjusted. · Plan of care discussed/reported to primary physician. I spent 35 min of time excluding procedure, with face to face evaluation  >50% on complex decision making, coordination of care and counseling patient.          Subjective:   - Presented with Community Acquired PNA  - E. Coli bacteremia from initial blood culture  - Pleural effusion, s/p thoracentesis on right 1/13/17 possible transudative  - Acute systolic CHF exacerbation, elevated BNP, evidence of volume overload on exam  - New cardiomyopathy with EF 30-35% by echo 1/14/17  - Hypokalemia  - Tobacco use  - COPD    1/16/17  Feels better  Wish for chest tube to be removed  Clamped chest tube since placed  Current Facility-Administered Medications   Medication Dose Route Frequency Provider Last Rate Last Dose    potassium chloride (K-DUR, KLOR-CON) SR tablet 20 mEq  20 mEq Oral BID Flo Gray NP   20 mEq at 01/16/17 1452    furosemide (LASIX) injection 40 mg  40 mg IntraVENous ONCE Deni Kapadia MD        [START ON 1/17/2017] predniSONE (DELTASONE) tablet 20 mg  20 mg Oral DAILY WITH BREAKFAST Deni Kapadia MD        furosemide (LASIX) tablet 20 mg  20 mg Oral BID Euel Romberg, MD   20 mg at 01/16/17 1036    miconazole (MICOTIN) 2 % vaginal cream 1 Applicator  1 Applicator Vaginal QPM Mallika Oro MD   1 Applicator at 39/20/31 8050    lisinopril (PRINIVIL, ZESTRIL) tablet 2.5 mg  2.5 mg Oral DAILY Gauri Cortez PA-C   2.5 mg at 01/16/17 1036    carvedilol (COREG) tablet 3.125 mg  3.125 mg Oral BID WITH MEALS Gauri Cortez PA-C   3.125 mg at 01/16/17 1036    aspirin chewable tablet 81 mg  81 mg Oral DAILY Gauri Cortez PA-C   81 mg at 01/16/17 1036    levoFLOXacin (LEVAQUIN) tablet 500 mg  500 mg Oral Q24H Jose Espinosa MD   500 mg at 01/15/17 1744    nystatin (MYCOSTATIN) 100,000 unit/mL oral suspension 500,000 Units  500,000 Units Oral QID Karlene Nice MD   500,000 Units at 01/16/17 1452    pantoprazole (PROTONIX) tablet 40 mg  40 mg Oral ACB Karen Danielle MD   40 mg at 01/16/17 1036    alum-mag hydroxide-simeth (MYLANTA) oral suspension 30 mL  30 mL Oral Q4H PRN Juve Buckner MD   30 mL at 01/09/17 4643    sodium chloride (NS) flush 5-10 mL  5-10 mL IntraVENous PRN Wale Alejandre MD   10 mL at 17 0652    acetaminophen (TYLENOL) tablet 650 mg  650 mg Oral Q4H PRN Alisia De La Garza MD   650 mg at 17 1031    morphine injection 2 mg  2 mg IntraVENous Q4H PRN Moises Chauhan MD        naloxone (NARCAN) injection 0.4 mg  0.4 mg IntraVENous PRN Moises Chauhan MD        ondansetron (ZOFRAN) injection 4 mg  4 mg IntraVENous Q4H PRN Moises Chauhan MD        enoxaparin (LOVENOX) injection 40 mg  40 mg SubCUTAneous Q24H Moises Chauhan MD   40 mg at 01/15/17 2233    albuterol-ipratropium (DUO-NEB) 2.5 MG-0.5 MG/3 ML  3 mL Nebulization Q6H RT Alisia De La Garza MD   3 mL at 17 1405       Review of Systems:  HEENT: No epistaxis, no nasal drainage, no difficulty in swallowing, no redness in eyes  Cardiovascular: no chest pain, no palpitations, no chronic leg edema, no syncope  Gastrointestinal: no abd pain, no vomiting, no diarrhea, no bleeding symptoms  Neurological: No focal weakness, no seizures, no headaches  Constitutional: No fever, no chills, no weight loss, no night sweats     Objective:   Vital Signs:    Visit Vitals    BP 90/47 (BP 1 Location: Right arm, BP Patient Position: At rest)    Pulse 83    Temp 98.2 °F (36.8 °C)    Resp 16    Ht 5' 5\" (1.651 m)    Wt 54.4 kg (120 lb)    SpO2 94%    BMI 19.97 kg/m2       O2 Device: Nasal cannula   O2 Flow Rate (L/min): 1 l/min   Temp (24hrs), Av.2 °F (36.8 °C), Min:97.6 °F (36.4 °C), Max:98.6 °F (37 °C)       Intake/Output:   Last shift:         Last 3 shifts:  1901 -  0700  In: 440 [P.O.:440]  Out: -     Intake/Output Summary (Last 24 hours) at 17 1615  Last data filed at 17 0000   Gross per 24 hour   Intake              200 ml   Output                0 ml   Net              200 ml      Physical Exam:     Physical Exam:   General: comfortable, no acute distress  HEENT: pupils reactive, sclera anicteric, EOM intact  Neck: No adenopathy or thyroid swelling, no lymphadenopathy or JVD, supple  CVS: S1S2 no murmurs  RS: Mod AE bilaterally, minimal decrease at base Lots of rales especially on right side heard  Abd: soft, non tender, no hepatosplenomegaly  Neuro: non focal, awake, alert  Extrm: no leg edema, clubbing or cyanosis  Lymph node: No obvious palpable lymph node appreciated. Skin: no rash      Chemistry Recent Labs      01/16/17   0435  01/15/17   0412  01/14/17   0425   GLU  82  98  82   NA  142  142  141   K  3.2*  2.6*  3.1*   CL  96*  94*  94*   CO2  38*  39*  38*   BUN  16  17  16   CREA  0.40*  0.37*  0.39*   CA  8.2*  7.7*  8.2*   MG  1.3*  1.2*   --    AGAP  8  9  9   BUCR  40*  46*  41*        Lactic Acid No results found for: LAC  No results for input(s): LAC in the last 72 hours. Liver Enzymes Protein, total   Date Value Ref Range Status   01/05/2017 5.9 (L) 6.4 - 8.2 g/dL Final     Albumin   Date Value Ref Range Status   01/05/2017 2.9 (L) 3.4 - 5.0 g/dL Final     Globulin   Date Value Ref Range Status   01/05/2017 3.0 2.0 - 4.0 g/dL Final     A-G Ratio   Date Value Ref Range Status   01/05/2017 1.0 0.8 - 1.7   Final     AST   Date Value Ref Range Status   01/05/2017 28 15 - 37 U/L Final     Alk. phosphatase   Date Value Ref Range Status   01/05/2017 96 45 - 117 U/L Final     No results for input(s): TP, ALB, GLOB, AGRAT, SGOT, GPT, AP, TBIL in the last 72 hours.     No lab exists for component: DBIL     CBC w/Diff Recent Labs      01/16/17   0435  01/15/17   0412  01/14/17   0425   WBC  10.8  9.9  13.6*   RBC  3.03*  3.07*  3.15*   HGB  12.1  12.1  12.6   HCT  35.2  35.2  36.2   PLT  319  306  297   GRANS  83*  83*  90*   LYMPH  9*  10*  5*   EOS  0  0  0        Cardiac Enzymes No results found for: CPK, CKMMB, CKMB, RCK3, CKMBT, CKNDX, CKND1, ESTEFANY, TROPT, TROIQ, TEZ, TROPT, TNIPOC, BNP, BNPP     BNP No results found for: BNP, BNPP, XBNPT     Coagulation No results for input(s): PTP, INR, APTT in the last 72 hours. No lab exists for component: INREXT      Thyroid  Lab Results   Component Value Date/Time    TSH 1.03 01/11/2017 04:35 AM            ABG No results for input(s): PHI, PHI, PCO2I, PO2, PO2I, HCO3, HCO3I, FIO2, FIO2I in the last 72 hours. No lab exists for component: POC2     Urinalysis Lab Results   Component Value Date/Time    Color YELLOW 01/07/2017 11:45 AM    Appearance CLEAR 01/07/2017 11:45 AM    Specific gravity 1.021 01/07/2017 11:45 AM    pH (UA) 6.5 01/07/2017 11:45 AM    Protein TRACE 01/07/2017 11:45 AM    Glucose NEGATIVE  01/07/2017 11:45 AM    Ketone TRACE 01/07/2017 11:45 AM    Bilirubin NEGATIVE  01/07/2017 11:45 AM    Urobilinogen 0.2 01/07/2017 11:45 AM    Nitrites NEGATIVE  01/07/2017 11:45 AM    Leukocyte Esterase TRACE 01/07/2017 11:45 AM    Epithelial cells 2+ 01/07/2017 11:45 AM    Bacteria 2+ 01/07/2017 11:45 AM    WBC NEGATIVE  01/07/2017 11:45 AM    RBC NEGATIVE  01/07/2017 11:45 AM        Micro  Recent Labs      01/13/17   1730   CULT  NO GROWTH 3 DAYS     Recent Labs      01/13/17   1730   CULT  NO GROWTH 3 DAYS        XR (Most Recent). CXR reviewed by me and compared with previous CXR   Results from Hospital Encounter encounter on 01/05/17   XR CHEST PORT   Narrative EXAM: Chest portable    INDICATION: Right chest tube    COMPARISON: Single view chest 1/14/2017    _______________    FINDINGS:    AP portable chest film was performed. Pigtail catheter is present in the  inferior right hemithorax. This appears unchanged. Persistent right lower lobe  and middle lobe opacity which may represent residual atelectasis or infiltrate. Stable small to moderate left effusion with probable underlying atelectasis. Upper lung fields are clear. No pneumothorax. Heart is probably enlarged. Pulmonary vascularity is normal.    _______________         Impression IMPRESSION:      1. Stable chest with right chest tube. No pneumothorax.  Persistent right lower  lung field atelectasis versus infiltrate with left effusion. CT (Most Recent) No results found for this or any previous visit. EKG No results found for this or any previous visit. ECHO SUMMARY:  Left ventricle: Size was normal. Systolic function was moderately reduced. Ejection fraction was estimated in the range of 30 % to 35 %. There  appeared to be dyskinesis of the anterior, anteroseptal and apical septal  segments. Wall thickness was normal. Doppler parameters were consistent  with abnormal left ventricular relaxation (grade 1 diastolic dysfunction). Right ventricle: The size was normal. Estimated peak pressure was in the  range of 20 mmHg to 25 mmHg. Left atrium: The atrium was dilated. Right atrium: Size was normal.    Mitral valve: There was mild regurgitation. Aortic valve: There was no stenosis. There was no regurgitation. Tricuspid valve: There was trivial regurgitation. Pericardium: A small pericardial effusion was identified. There was no  evidence of hemodynamic compromise. There was a left pleural effusion. PFT No flowsheet data found. Results for MICHAEL DAMICO (MRN [id number]) as of 1/16/2017 15:55   Ref. Range 1/13/2017 17:30   BODY FLUID TYPE Latest Units:   PLEURAL FLUID   FLUID APPEARANCE Latest Units:   HAZY   FLUID COLOR Latest Units:   YELLOW   FLUID RBC COUNT Latest Ref Range: NRRE /cu mm 440 (A)   FLD NUCLEATED CELLS Latest Ref Range: NRRE /cu mm 895 (A)   FLD SEGS Latest Units: % 78   FLD BANDS Latest Units: % 0   FLD LYMPHS Latest Units: % 11   FLD MONOCYTES Latest Units: % 0   FLD EOSINS Latest Units: % 0   MACROPHAGE Latest Units: % 11   FLUID TYPE(15) Latest Units:   PLEURAL FLUID   FLUID PH Latest Units:   7.6   Fluid Type: Latest Units:   PLEURAL FLUID   Protein total, body fld. Latest Units: g/dL 1.6   Fluid Type: Latest Units:   PLEURAL FLUID   Glucose, body fld.  Latest Units: MG/   Fluid Type: Latest Units:   PLEURAL FLUID   LD, body fld. Latest Units: U/L 131       Imaging:  I have personally reviewed the patients radiographs and have reviewed the reports:             Plan of care discussed with patient.     Madie De La Rosa MD  Pulmonary Critical Care & Sleep Medicine

## 2017-01-16 NOTE — PROGRESS NOTES
[facility] - Progress Note  Admit Date: 1/5/2017      I saw, evaluated, interviewed and examined the patient personally. I agree with the findings and plan of care as documented below with [id number] note  Pneumonia and sepsis initiall  New cardiomypathy by echo  Nuclear stress test today without ischemia or infarct  Continue aggressive medical management. Continue BB, ACE-I, ASA  Can change lasix to 40 mg once a day dosing. Abhi Beard MD          Assessment:     - Presented with Community Acquired PNA  - E. Coli bacteremia from initial blood culture  - Pleural effusion, s/p thoracentesis on right 6/27/11  - Acute systolic CHF exacerbation, elevated BNP, evidence of volume overload on exam  - New cardiomyopathy with EF 30-35% by echo 1/14/17  - Hypokalemia  - Tobacco use  - COPD  - Macrocytosis    Plan:     - Nuclear stress test in progress today. Full report to follow. - Potassium replacement ordered  - Continue with ace-i, ASA, and BB. On PO Lasix, volume status stable. Subjective:     No new complaints. Objective:      Patient Vitals for the past 8 hrs:   Temp Pulse Resp BP SpO2   01/16/17 0518 98.6 °F (37 °C) 82 18 129/57 96 %         No data found.                    Intake/Output Summary (Last 24 hours) at 01/16/17 1024  Last data filed at 01/16/17 0000   Gross per 24 hour   Intake              320 ml   Output                0 ml   Net              320 ml       Physical Exam:  General:  alert, cooperative, no distress  Neck:  nontender, no JVD  Lungs:  clear to auscultation bilaterally  Heart:  regular rate and rhythm, S1, S2 normal, no murmur, click, rub or gallop  Abdomen:  abdomen is soft without significant tenderness, masses, organomegaly or guarding  Extremities:  extremities normal, atraumatic, no cyanosis or edema    Data Review:     Labs: Results:       Chemistry Recent Labs      01/16/17   0435  01/15/17   0412  01/14/17   0425   GLU  82  98  82   NA  142  142  141   K 3.2*  2.6*  3.1*   CL  96*  94*  94*   CO2  38*  39*  38*   BUN  16  17  16   CREA  0.40*  0.37*  0.39*   CA  8.2*  7.7*  8.2*   MG  1.3*  1.2*   --    AGAP  8  9  9   BUCR  40*  46*  41*      CBC w/Diff Recent Labs      01/16/17   0435  01/15/17   0412  01/14/17   0425   WBC  10.8  9.9  13.6*   RBC  3.03*  3.07*  3.15*   HGB  12.1  12.1  12.6   HCT  35.2  35.2  36.2   PLT  319  306  297   GRANS  83*  83*  90*   LYMPH  9*  10*  5*   EOS  0  0  0      Cardiac Enzymes No results found for: CPK, CKMMB, CKMB, RCK3, CKMBT, CKNDX, CKND1, ESTEFANY, TROPT, TROIQ, TEZ, TROPT, TNIPOC, BNP, BNPP   Coagulation No results for input(s): PTP, INR, APTT in the last 72 hours. No lab exists for component: INREXT    Lipid Panel No results found for: CHOL, CHOLPOCT, CHOLX, CHLST, CHOLV, 262277, HDL, LDL, NLDLCT, DLDL, LDLC, DLDLP, 509455, VLDLC, VLDL, TGL, TGLX, TRIGL, [id number], TRIGP, TGLPOCT, 256335, CHHD, CHHDX   BNP No results found for: BNP, BNPP, XBNPT   Liver Enzymes No results for input(s): TP, ALB, TBIL, AP, SGOT, GPT in the last 72 hours. No lab exists for component: DBIL   Digoxin    Thyroid Studies Lab Results   Component Value Date/Time    TSH 1.03 01/11/2017 04:35 AM          Signed By: Kati Edmonds.  Darcie Catalan     January 16, 2017

## 2017-01-17 ENCOUNTER — APPOINTMENT (OUTPATIENT)
Dept: GENERAL RADIOLOGY | Age: 72
DRG: 871 | End: 2017-01-17
Attending: INTERNAL MEDICINE
Payer: MEDICARE

## 2017-01-17 VITALS
SYSTOLIC BLOOD PRESSURE: 106 MMHG | BODY MASS INDEX: 19.99 KG/M2 | RESPIRATION RATE: 20 BRPM | HEART RATE: 82 BPM | HEIGHT: 65 IN | TEMPERATURE: 98.1 F | WEIGHT: 120 LBS | OXYGEN SATURATION: 91 % | DIASTOLIC BLOOD PRESSURE: 56 MMHG

## 2017-01-17 LAB
ANION GAP BLD CALC-SCNC: 7 MMOL/L (ref 3–18)
BASOPHILS # BLD AUTO: 0 K/UL (ref 0–0.06)
BASOPHILS # BLD: 0 % (ref 0–2)
BUN SERPL-MCNC: 17 MG/DL (ref 7–18)
BUN/CREAT SERPL: 35 (ref 12–20)
CALCIUM SERPL-MCNC: 7.9 MG/DL (ref 8.5–10.1)
CHLORIDE SERPL-SCNC: 98 MMOL/L (ref 100–108)
CO2 SERPL-SCNC: 38 MMOL/L (ref 21–32)
CREAT SERPL-MCNC: 0.48 MG/DL (ref 0.6–1.3)
DIFFERENTIAL METHOD BLD: ABNORMAL
EOSINOPHIL # BLD: 0.1 K/UL (ref 0–0.4)
EOSINOPHIL NFR BLD: 1 % (ref 0–5)
ERYTHROCYTE [DISTWIDTH] IN BLOOD BY AUTOMATED COUNT: 12.7 % (ref 11.6–14.5)
GLUCOSE SERPL-MCNC: 80 MG/DL (ref 74–99)
HCT VFR BLD AUTO: 34 % (ref 35–45)
HGB BLD-MCNC: 11.7 G/DL (ref 12–16)
LYMPHOCYTES # BLD AUTO: 10 % (ref 21–52)
LYMPHOCYTES # BLD: 1 K/UL (ref 0.9–3.6)
MAGNESIUM SERPL-MCNC: 1.8 MG/DL (ref 1.8–2.4)
MCH RBC QN AUTO: 39.9 PG (ref 24–34)
MCHC RBC AUTO-ENTMCNC: 34.4 G/DL (ref 31–37)
MCV RBC AUTO: 116 FL (ref 74–97)
MONOCYTES # BLD: 1 K/UL (ref 0.05–1.2)
MONOCYTES NFR BLD AUTO: 10 % (ref 3–10)
NEUTS SEG # BLD: 8.3 K/UL (ref 1.8–8)
NEUTS SEG NFR BLD AUTO: 79 % (ref 40–73)
PLATELET # BLD AUTO: 367 K/UL (ref 135–420)
PMV BLD AUTO: 9.8 FL (ref 9.2–11.8)
POTASSIUM SERPL-SCNC: 3.5 MMOL/L (ref 3.5–5.5)
RBC # BLD AUTO: 2.93 M/UL (ref 4.2–5.3)
SODIUM SERPL-SCNC: 143 MMOL/L (ref 136–145)
WBC # BLD AUTO: 10.4 K/UL (ref 4.6–13.2)

## 2017-01-17 PROCEDURE — 94640 AIRWAY INHALATION TREATMENT: CPT

## 2017-01-17 PROCEDURE — 80048 BASIC METABOLIC PNL TOTAL CA: CPT | Performed by: INTERNAL MEDICINE

## 2017-01-17 PROCEDURE — 74011250636 HC RX REV CODE- 250/636: Performed by: INTERNAL MEDICINE

## 2017-01-17 PROCEDURE — 36415 COLL VENOUS BLD VENIPUNCTURE: CPT | Performed by: INTERNAL MEDICINE

## 2017-01-17 PROCEDURE — 74011000250 HC RX REV CODE- 250: Performed by: FAMILY MEDICINE

## 2017-01-17 PROCEDURE — 74011250637 HC RX REV CODE- 250/637: Performed by: INTERNAL MEDICINE

## 2017-01-17 PROCEDURE — 74011250637 HC RX REV CODE- 250/637: Performed by: HOSPITALIST

## 2017-01-17 PROCEDURE — 74011636637 HC RX REV CODE- 636/637: Performed by: INTERNAL MEDICINE

## 2017-01-17 PROCEDURE — 74011000250 HC RX REV CODE- 250: Performed by: INTERNAL MEDICINE

## 2017-01-17 PROCEDURE — 83735 ASSAY OF MAGNESIUM: CPT | Performed by: INTERNAL MEDICINE

## 2017-01-17 PROCEDURE — 85025 COMPLETE CBC W/AUTO DIFF WBC: CPT | Performed by: INTERNAL MEDICINE

## 2017-01-17 PROCEDURE — 94760 N-INVAS EAR/PLS OXIMETRY 1: CPT

## 2017-01-17 PROCEDURE — 74011250637 HC RX REV CODE- 250/637: Performed by: PHYSICIAN ASSISTANT

## 2017-01-17 PROCEDURE — 74011250637 HC RX REV CODE- 250/637: Performed by: NURSE PRACTITIONER

## 2017-01-17 PROCEDURE — 71010 XR CHEST PORT: CPT

## 2017-01-17 RX ORDER — FUROSEMIDE 10 MG/ML
40 INJECTION INTRAMUSCULAR; INTRAVENOUS ONCE
Status: COMPLETED | OUTPATIENT
Start: 2017-01-17 | End: 2017-01-17

## 2017-01-17 RX ORDER — POTASSIUM CHLORIDE 20 MEQ/1
20 TABLET, EXTENDED RELEASE ORAL 2 TIMES DAILY
Qty: 14 TAB | Refills: 0 | Status: SHIPPED | OUTPATIENT
Start: 2017-01-17 | End: 2017-01-27 | Stop reason: ALTCHOICE

## 2017-01-17 RX ORDER — CARVEDILOL 3.12 MG/1
3.12 TABLET ORAL 2 TIMES DAILY WITH MEALS
Qty: 60 TAB | Refills: 0 | Status: SHIPPED | OUTPATIENT
Start: 2017-01-17 | End: 2017-02-24 | Stop reason: ALTCHOICE

## 2017-01-17 RX ORDER — ALBUTEROL SULFATE 90 UG/1
1 AEROSOL, METERED RESPIRATORY (INHALATION)
Qty: 1 INHALER | Refills: 0 | Status: SHIPPED | OUTPATIENT
Start: 2017-01-17

## 2017-01-17 RX ORDER — FUROSEMIDE 20 MG/1
20 TABLET ORAL 2 TIMES DAILY
Qty: 14 TAB | Refills: 0 | Status: SHIPPED | OUTPATIENT
Start: 2017-01-17 | End: 2017-01-17

## 2017-01-17 RX ORDER — LEVOFLOXACIN 500 MG/1
500 TABLET, FILM COATED ORAL EVERY 24 HOURS
Qty: 5 TAB | Refills: 0 | Status: SHIPPED | OUTPATIENT
Start: 2017-01-17 | End: 2017-01-26

## 2017-01-17 RX ORDER — LISINOPRIL 2.5 MG/1
2.5 TABLET ORAL DAILY
Qty: 30 TAB | Refills: 0 | Status: SHIPPED | OUTPATIENT
Start: 2017-01-17 | End: 2017-02-24 | Stop reason: ALTCHOICE

## 2017-01-17 RX ORDER — FUROSEMIDE 20 MG/1
40 TABLET ORAL DAILY
Qty: 60 TAB | Refills: 0 | Status: SHIPPED | OUTPATIENT
Start: 2017-01-17 | End: 2017-01-27 | Stop reason: ALTCHOICE

## 2017-01-17 RX ORDER — PREDNISONE 10 MG/1
TABLET ORAL
Qty: 4 TAB | Refills: 0 | Status: SHIPPED | OUTPATIENT
Start: 2017-01-17 | End: 2017-01-27 | Stop reason: ALTCHOICE

## 2017-01-17 RX ORDER — GUAIFENESIN 100 MG/5ML
81 LIQUID (ML) ORAL DAILY
Qty: 30 TAB | Refills: 0 | Status: SHIPPED | OUTPATIENT
Start: 2017-01-17 | End: 2021-01-01

## 2017-01-17 RX ADMIN — POTASSIUM CHLORIDE 20 MEQ: 20 TABLET, EXTENDED RELEASE ORAL at 09:20

## 2017-01-17 RX ADMIN — IPRATROPIUM BROMIDE AND ALBUTEROL SULFATE 3 ML: .5; 3 SOLUTION RESPIRATORY (INHALATION) at 09:56

## 2017-01-17 RX ADMIN — BUDESONIDE 500 MCG: 0.5 SUSPENSION RESPIRATORY (INHALATION) at 09:55

## 2017-01-17 RX ADMIN — PREDNISONE 20 MG: 20 TABLET ORAL at 09:21

## 2017-01-17 RX ADMIN — PANTOPRAZOLE SODIUM 40 MG: 40 TABLET, DELAYED RELEASE ORAL at 09:21

## 2017-01-17 RX ADMIN — CARVEDILOL 3.12 MG: 3.12 TABLET, FILM COATED ORAL at 09:21

## 2017-01-17 RX ADMIN — NYSTATIN 500000 UNITS: 100000 SUSPENSION ORAL at 09:21

## 2017-01-17 RX ADMIN — ARFORMOTEROL TARTRATE 15 MCG: 15 SOLUTION RESPIRATORY (INHALATION) at 09:55

## 2017-01-17 RX ADMIN — FUROSEMIDE 40 MG: 10 INJECTION, SOLUTION INTRAMUSCULAR; INTRAVENOUS at 12:35

## 2017-01-17 RX ADMIN — IPRATROPIUM BROMIDE AND ALBUTEROL SULFATE 3 ML: .5; 3 SOLUTION RESPIRATORY (INHALATION) at 02:00

## 2017-01-17 RX ADMIN — FUROSEMIDE 20 MG: 20 TABLET ORAL at 09:21

## 2017-01-17 RX ADMIN — LISINOPRIL 2.5 MG: 5 TABLET ORAL at 09:20

## 2017-01-17 RX ADMIN — NYSTATIN 500000 UNITS: 100000 SUSPENSION ORAL at 12:35

## 2017-01-17 RX ADMIN — ASPIRIN 81 MG 81 MG: 81 TABLET ORAL at 09:21

## 2017-01-17 NOTE — PROGRESS NOTES
I have reviewed discharge instructions with the patient and spouse. The patient and spouse verbalized understanding.     Patient armband removed and shredded    Prescriptions given to patient

## 2017-01-17 NOTE — PROGRESS NOTES
Ganga Bell M.D. Pulmonary Critical Care & Sleep Medicine       Follow up     Name: Drema Soulier MRN: [id number]   : 1945 Hospital: [facility]   Date: 2017        IMPRESSION:   Patient Active Problem List   Diagnosis Code    Pneumonia J18.9    Sepsis ([facility] [address] A41.9 ·   Bilateral effusion possible transudate <protein and just LDH criteria pending LDH ratio>  · Possible COPD ex smoker  · Cardiomyopathy< Stress test negative and EF better on stress test>  · S/P chest tube placement for unknown reason< Do  Not see rationale on transudative effusion to have chest tubes>  · Tube was clamped since 2 days no pneumo or worsening effusion noted      RECOMMENDATIONS: S/P removal of tube doing well  Give additional lasix in am  Close follow up with cardiology  Out patient Pulmonary Follow up and PFTs for COPD  Doing well at rest on room air consider doing walk test to evaluate for home oxygen on discharge  Smoking cessation counseling done  Was on symbicort at home will resume it here and continue for now adjust with PFTs   Not sure on role of steroids will taper it Dc on 3-5 days taper  Out patient PFTS  Possible DC soon patient would like to discuss with primary and decide on pulmonary follow up   · Pleural fluid culture is negative  · Unfortunately Pleural fluid cytology was never sent. · Prior/Old records reviewed and discussed with patient. · Labs, Images and available PFT and sleep study discussed with patient. Labs and images personally seen and available reports reviewed with patient. · All current medicines are reviewed and doses and prescription adjusted. · Plan of care discussed/reported to primary physician. I spent 35 min of time excluding procedure, with face to face evaluation  >50% on complex decision making, coordination of care and counseling patient. Subjective:   - Presented with Community Acquired PNA  - E. Coli bacteremia from initial blood culture  - Pleural effusion, s/p thoracentesis on right 1/13/17 possible transudative  - Acute systolic CHF exacerbation, elevated BNP, evidence of volume overload on exam  - New cardiomyopathy with EF 30-35% by echo 1/14/17  - Hypokalemia  - Tobacco use  - COPD    1/17/17  Feels better  S/P removal of chest tube for transudate effusion  Doing well  Want to go home  No other complain occasional sharp pain where chest tube was inserted    Current Facility-Administered Medications   Medication Dose Route Frequency Provider Last Rate Last Dose    furosemide (LASIX) injection 40 mg  40 mg IntraVENous ONCE Deni Hurtado MD        potassium chloride (K-DUR, KLOR-CON) SR tablet 20 mEq  20 mEq Oral BID Forrest Wick NP   20 mEq at 01/17/17 0920    predniSONE (DELTASONE) tablet 20 mg  20 mg Oral DAILY WITH BREAKFAST Deni Oliveira MD   20 mg at 01/17/17 0921    budesonide (PULMICORT) 500 mcg/2 ml nebulizer suspension  500 mcg Nebulization BID RT Iván Gomez MD   500 mcg at 01/17/17 0955    arformoterol (BROVANA) neb solution 15 mcg  15 mcg Nebulization BID RT Iván Gomez MD   15 mcg at 01/17/17 0955    furosemide (LASIX) tablet 20 mg  20 mg Oral BID Uche Arora MD   20 mg at 01/17/17 5885    miconazole (MICOTIN) 2 % vaginal cream 1 Applicator  1 Applicator Vaginal QPM Gabriella Muñiz MD   1 Applicator at 40/94/60 8899    lisinopril (PRINIVIL, ZESTRIL) tablet 2.5 mg  2.5 mg Oral DAILY Ryan Cortez PA-C   2.5 mg at 01/17/17 0920    carvedilol (COREG) tablet 3.125 mg  3.125 mg Oral BID WITH MEALS Ryan Cortez PA-C   3.125 mg at 01/17/17 1985    aspirin chewable tablet 81 mg  81 mg Oral DAILY Ryan Cortez PA-C   81 mg at 01/17/17 0921    levoFLOXacin (LEVAQUIN) tablet 500 mg  500 mg Oral Q24H Jose Espinosa MD   500 mg at 01/16/17 1847    nystatin (MYCOSTATIN) 100,000 unit/mL oral suspension 500,000 Units  500,000 Units Oral QID Mckenzie Vidales MD   500,000 Units at 01/17/17 0921    pantoprazole (PROTONIX) tablet 40 mg  40 mg Oral ACB Ty Acharya MD   40 mg at 17 8107    alum-mag hydroxide-simeth (MYLANTA) oral suspension 30 mL  30 mL Oral Q4H PRN Carlos Javed MD   30 mL at 17 2133    sodium chloride (NS) flush 5-10 mL  5-10 mL IntraVENous PRN Patricia Delgado MD   10 mL at 17 9764    acetaminophen (TYLENOL) tablet 650 mg  650 mg Oral Q4H PRN Carlos Javed MD   650 mg at 17 1031    morphine injection 2 mg  2 mg IntraVENous Q4H PRN Moises Tucker MD        naloxone (NARCAN) injection 0.4 mg  0.4 mg IntraVENous PRN Moises Tucker MD        ondansetron (ZOFRAN) injection 4 mg  4 mg IntraVENous Q4H PRN Moises Tucker MD        enoxaparin (LOVENOX) injection 40 mg  40 mg SubCUTAneous Q24H Moises Tucker MD   40 mg at 17 2138    albuterol-ipratropium (DUO-NEB) 2.5 MG-0.5 MG/3 ML  3 mL Nebulization Q6H RT Carlos Javed MD   3 mL at 17 0956       Review of Systems:  HEENT: No epistaxis, no nasal drainage, no difficulty in swallowing, no redness in eyes  Cardiovascular: no chest pain, no palpitations, no chronic leg edema, no syncope  Gastrointestinal: no abd pain, no vomiting, no diarrhea, no bleeding symptoms  Neurological: No focal weakness, no seizures, no headaches  Constitutional: No fever, no chills, no weight loss, no night sweats     Objective:   Vital Signs:    Visit Vitals    /56 (BP 1 Location: Right arm, BP Patient Position: Supine)    Pulse 82    Temp 98.1 °F (36.7 °C)    Resp 20    Ht 5' 5\" (1.651 m)    Wt 54.4 kg (120 lb)    SpO2 91%    BMI 19.97 kg/m2       O2 Device: Room air   O2 Flow Rate (L/min): 1 l/min   Temp (24hrs), Av.1 °F (36.7 °C), Min:97.7 °F (36.5 °C), Max:98.4 °F (36.9 °C)       Intake/Output:   Last shift:      701 - 1900  In: 480 [P.O.:480]  Out: -   Last 3 shifts: 01/15 1901 - 700  In: 640 [P.O.:640]  Out: -     Intake/Output Summary (Last 24 hours) at 01/17/17 1132  Last data filed at 01/17/17 0936   Gross per 24 hour   Intake              920 ml   Output                0 ml   Net              920 ml      Physical Exam:     Physical Exam:   General: comfortable, no acute distress  HEENT: pupils reactive, sclera anicteric, EOM intact  Neck: No adenopathy or thyroid swelling, no lymphadenopathy or JVD, supple  CVS: S1S2 no murmurs  RS: Mod AE bilaterally, minimal decrease at base Lots of rales especially on right side heard  Abd: soft, non tender, no hepatosplenomegaly  Neuro: non focal, awake, alert  Extrm: no leg edema, clubbing or cyanosis  Lymph node: No obvious palpable lymph node appreciated. Skin: no rash      Chemistry Recent Labs      01/17/17   0430  01/16/17   1712  01/16/17   0435  01/15/17   0412   GLU  80   --   82  98   NA  143   --   142  142   K  3.5   --   3.2*  2.6*   CL  98*   --   96*  94*   CO2  38*   --   38*  39*   BUN  17   --   16 17   CREA  0.48*   --   0.40*  0.37*   CA  7.9*   --   8.2*  7.7*   MG  1.8   --   1.3*  1.2*   AGAP  7   --   8  9   BUCR  35*   --   40*  46*   AP   --   100   --    --    TP   --   4.9*   --    --    ALB   --   2.4*   --    --    GLOB   --   2.5   --    --    AGRAT   --   1.0   --    --         Lactic Acid No results found for: LAC  No results for input(s): LAC in the last 72 hours. Liver Enzymes Protein, total   Date Value Ref Range Status   01/16/2017 4.9 (L) 6.4 - 8.2 g/dL Final     Albumin   Date Value Ref Range Status   01/16/2017 2.4 (L) 3.4 - 5.0 g/dL Final     Globulin   Date Value Ref Range Status   01/16/2017 2.5 2.0 - 4.0 g/dL Final     A-G Ratio   Date Value Ref Range Status   01/16/2017 1.0 0.8 - 1.7   Final     AST   Date Value Ref Range Status   01/16/2017 55 (H) 15 - 37 U/L Final     Alk.  phosphatase   Date Value Ref Range Status   01/16/2017 100 45 - 117 U/L Final     Recent Labs      01/16/17   1712   TP  4.9*   ALB  2.4* GLOB  2.5   AGRAT  1.0   SGOT  55*   AP  100        CBC w/Diff Recent Labs      01/17/17   0430  01/16/17   0435  01/15/17   0412   WBC  10.4  10.8  9.9   RBC  2.93*  3.03*  3.07*   HGB  11.7*  12.1  12.1   HCT  34.0*  35.2  35.2   PLT  367  319  306   GRANS  79*  83*  83*   LYMPH  10*  9*  10*   EOS  1  0  0        Cardiac Enzymes No results found for: CPK, CKMMB, CKMB, RCK3, CKMBT, CKNDX, CKND1, ESTEFANY, TROPT, TROIQ, TEZ, TROPT, TNIPOC, BNP, BNPP     BNP No results found for: BNP, BNPP, XBNPT     Coagulation No results for input(s): PTP, INR, APTT in the last 72 hours. No lab exists for component: INREXT, INREXT      Thyroid  Lab Results   Component Value Date/Time    TSH 1.03 01/11/2017 04:35 AM            ABG No results for input(s): PHI, PHI, PCO2I, PO2, PO2I, HCO3, HCO3I, FIO2, FIO2I in the last 72 hours. No lab exists for component: POC2     Urinalysis Lab Results   Component Value Date/Time    Color YELLOW 01/07/2017 11:45 AM    Appearance CLEAR 01/07/2017 11:45 AM    Specific gravity 1.021 01/07/2017 11:45 AM    pH (UA) 6.5 01/07/2017 11:45 AM    Protein TRACE 01/07/2017 11:45 AM    Glucose NEGATIVE  01/07/2017 11:45 AM    Ketone TRACE 01/07/2017 11:45 AM    Bilirubin NEGATIVE  01/07/2017 11:45 AM    Urobilinogen 0.2 01/07/2017 11:45 AM    Nitrites NEGATIVE  01/07/2017 11:45 AM    Leukocyte Esterase TRACE 01/07/2017 11:45 AM    Epithelial cells 2+ 01/07/2017 11:45 AM    Bacteria 2+ 01/07/2017 11:45 AM    WBC NEGATIVE  01/07/2017 11:45 AM    RBC NEGATIVE  01/07/2017 11:45 AM        Micro  No results for input(s): SDES, CULT in the last 72 hours. No results for input(s): CULT in the last 72 hours. XR (Most Recent). CXR reviewed by me and compared with previous CXR   Results from Hospital Encounter encounter on 01/05/17   XR CHEST PORT   Narrative Chest, single view    Indication: Hypoxia    Comparison: Several prior exams, most recently 1/15/2017.     Findings:  Portable upright AP view of the chest was obtained. The degree of  pulmonary inflation is similar to prior exams. Patchy right lower lung opacity  as well as dense left retrocardiac opacity are unchanged. Bilateral pleural  effusions, left greater than right similar in appearance. Interval removal of a  right basilar thoracostomy tube. No pneumothorax. Cardiac size and mediastinal  contours are stable. No acute osseous abnormality. Impression Impression:    1. Bilateral, left greater than right pleural effusions with associated  bibasilar opacities which may represent atelectasis or pneumonia. 2. Interval removal right basilar thoracostomy tube without pneumothorax. CT (Most Recent) No results found for this or any previous visit. EKG No results found for this or any previous visit. ECHO SUMMARY:  Left ventricle: Size was normal. Systolic function was moderately reduced. Ejection fraction was estimated in the range of 30 % to 35 %. There  appeared to be dyskinesis of the anterior, anteroseptal and apical septal  segments. Wall thickness was normal. Doppler parameters were consistent  with abnormal left ventricular relaxation (grade 1 diastolic dysfunction). Right ventricle: The size was normal. Estimated peak pressure was in the  range of 20 mmHg to 25 mmHg. Left atrium: The atrium was dilated. Right atrium: Size was normal.    Mitral valve: There was mild regurgitation. Aortic valve: There was no stenosis. There was no regurgitation. Tricuspid valve: There was trivial regurgitation. Pericardium: A small pericardial effusion was identified. There was no  evidence of hemodynamic compromise. There was a left pleural effusion. PFT No flowsheet data found. Results for MARILYN DAMICOOPE (MRN [id number]) as of 1/16/2017 15:55   Ref.  Range 1/13/2017 17:30   BODY FLUID TYPE Latest Units:   PLEURAL FLUID   FLUID APPEARANCE Latest Units:   HAZY   FLUID COLOR Latest Units:   YELLOW   FLUID RBC COUNT Latest Ref Range: NRRE /cu mm 440 (A)   FLD NUCLEATED CELLS Latest Ref Range: NRRE /cu mm 895 (A)   FLD SEGS Latest Units: % 78   FLD BANDS Latest Units: % 0   FLD LYMPHS Latest Units: % 11   FLD MONOCYTES Latest Units: % 0   FLD EOSINS Latest Units: % 0   MACROPHAGE Latest Units: % 11   FLUID TYPE(15) Latest Units:   PLEURAL FLUID   FLUID PH Latest Units:   7.6   Fluid Type: Latest Units:   PLEURAL FLUID   Protein total, body fld. Latest Units: g/dL 1.6   Fluid Type: Latest Units:   PLEURAL FLUID   Glucose, body fld. Latest Units: MG/   Fluid Type: Latest Units:   PLEURAL FLUID   LD, body fld. Latest Units: U/L 131       Imaging:  I have personally reviewed the patients radiographs and have reviewed the reports:          1/17/17 CXR             Plan of care discussed with patient.     Balta Osuna MD  Pulmonary Critical Care & Sleep Medicine

## 2017-01-17 NOTE — MED STUDENT NOTES
*ATTENTION:  This note has been created by a medical student for educational purposes only. Please do not refer to the content of this note for clinical decision-making, billing, or other purposes. Please see attending physicians note to obtain clinical information on this patient. *       Student Progress Note  Please refer to attendings daily rounding note for full details      Patient: Amisha Mendieta MRN: [id number]  CSN: [id number]    YOB: 1945  Age: 70 y.o. Sex: female    DOA: 1/5/2017 LOS:  LOS: 12 days          Chief Complaint:  \"catch\" in R chest    Subjective:    Pt says she feels generally better but complains of a \"catch\" in the R side of her chest. Describes it as a short pain that she occasionally gets with deep breaths or movement. Pt says the pain has been present for several days and has not worsened or improved. Pt has been off of supplemental O2 since chest tube removal yesterday and denies SOB or chest pain. Pt says she feels ready for discharge. Objective:      Visit Vitals    /56 (BP 1 Location: Right arm, BP Patient Position: Supine)    Pulse 82    Temp 98.1 °F (36.7 °C)    Resp 20    Ht 5' 5\" (1.651 m)    Wt 54.4 kg (120 lb)    SpO2 91%    BMI 19.97 kg/m2         Physical Exam:  Gen: Thin, well nourished woman that is cooperative and AOx3  HEENT: Normocephalic and atruamatic  CV: RRR without murmurs, rubs, or gallops  Resp: CTA bilat with some diminished breath sounds in L lower lobe  Abd: soft, nontender, nondistended  Extrem:  Warm with distal pulses present x4. LE edema resolved  Skin: warm, atraumatic, and without rash. Site of chest tube removal is non-erythematous and healing well  Neuro: sensation and movement in all extremities. CXR this am reviewed. 1. Bilateral, left greater than right pleural effusions with associated  bibasilar opacities which may represent atelectasis or pneumonia.    2. Interval removal right basilar thoracostomy tube without pneumothorax. I have reviewed the patient's Labs and Radiology studies. Assessment/Plan:     Pt is ready for discharge after clearance form Cardiology and Pulmonology     1. CAP   - continue Levaquin, Vancomycin. FU with PCP in 1 week and for repeat CXR in 2-4 weeks    2. Acute systolic CHF exacerbation   - continue Lasix. Coreg, Lisinopril. Cardio following. LE edema resolved. 3. COPD   - continue Duo-nebs, Brovana, Pulmicort, Prednisone. FU with PCP      Karma Santiago  1/17/2017  11:38 AM  *ATTENTION:  This note has been created by a medical student for educational purposes only. Please do not refer to the content of this note for clinical decision-making, billing, or other purposes. Please see attending physicians note to obtain clinical information on this patient. *

## 2017-01-17 NOTE — PROGRESS NOTES
1950 -- Bedside and Verbal shift change report given to Yuridia Antunez RN (oncoming nurse) by [facility] aEmon., RN (offgoing nurse). Report included the following information SBAR, Procedure Summary, Intake/Output, MAR, Recent Results and Cardiac Rhythm ST. Pt awake in bed no pain concerns. Bed locked and lowered, siderails up x3. Call bell at bedside, will continue to monitor.      0026 -- Shift re-assessment completed, no change in pt condition.      0502 -- Shift re-assessment completed, no change in pt condition.       0902 -- Shift re-assessment completed, no change in pt condition. 1112 -- Bedside and Verbal shift change report given to Olla Mohs  (oncoming nurse) by Cassie Metcalf RN (offgoing nurse) Report included the following information SBAR, Procedure Summary, Intake/Output, MAR, Recent Results. Pt up in bed, no indication of pain or acute distress. Bed locked and lowered, siderails up x3. Call bell at bedside.

## 2017-01-17 NOTE — DISCHARGE SUMMARY
[facility] [place]    Patient: Dona Gomez MRN: [id number]  CSN: [id number]    YOB: 1945  Age: 70 y.o. Sex: female    DOA: 1/5/2017 LOS:  LOS: 12 days   Discharge Date:      Admission Diagnoses: Sepsis ([facility]  Pneumonia    Discharge Diagnoses:    Problem List as of 1/17/2017  Never Reviewed          Codes Class Noted - Resolved    Pneumonia ICD-10-CM: J18.9  ICD-9-CM: 931  1/5/2017 - Present        Sepsis ([facility] ICD-10-CM: A41.9  ICD-9-CM: 038.9, 995.91  1/5/2017 - Present              Discharge Condition: Stable    Discharge To: Home    Hospital Course: Dona Gomez is a 70 y.o. female who was admitted for community acquired pneumonia. Patient presented in ED as a transfer from Urgent care with c/o chest pain . Patient reported the pain was in the left sub sternum with radiation to her back. She reported that it had become progressively worse and pleuritic. She also c/o productive cough chills, wheezing and sob. She has hx copd and is a smoker. She had a CXR done out patient which shows RLL opacity. She also had a EKG done non diagnostic and Troponin WNL. She was started on Rocephin and Zithromax and IVF per sepsis protocol. Patient also received duoneb. Patient was admitted to Telemetry for further management. Patient continued to require 2L O2 via NC and was unable to be weaned. CBC revealed WBC trending upward from 5.5 to 14.2 despite antibiotics therapy. Patient also had increased BLE edema. PA and lateral was obtained which showed: Right greater than left moderate-sized pleural effusions and probable underlying atelectasis versus airspace disease. 2D echo, pro-BNP were ordered. BNP was 44527. 2D echo showed: EF of 30% to 35% and appeared to be dyskinesis of the anterior, anteroseptal and apical septal segments. Cardiology was consulted. Patient was started on Coreg, Lisinopril and Lasix for acute systolic CHF exacerbation.  Pulmonology was also consulted and patient diuresed with IV Lasix. On 1/13/17 patient underwent ultrasound guided right thoracentesis and chest tube placement. 500 mL straw colored, cloudy fluid was removed. NM stress test was completed on 1/16/17 which was without ischemia or infarct. Chest tube was removed on 1/16/17 after being clamped for 2 days. Patient was weaned from O2. Patient is stable for discharge home with additional 5 day course of PO Levaquin. Patient was instructed to follow up with her PCP within 1 week for follow up CXR, Cardiology within 1 week for CHF, and Pulmonology within 1-2 weeks for COPD and PFTs. Consults: Cardiology, Hospitalist and Pulmonary/Critical Care    Significant Diagnostic Studies:   Recent Results (from the past 24 hour(s))   HEPATIC FUNCTION PANEL    Collection Time: 01/16/17  5:12 PM   Result Value Ref Range    Protein, total 4.9 (L) 6.4 - 8.2 g/dL    Albumin 2.4 (L) 3.4 - 5.0 g/dL    Globulin 2.5 2.0 - 4.0 g/dL    A-G Ratio 1.0 0.8 - 1.7      Bilirubin, total 0.4 0.2 - 1.0 MG/DL    Bilirubin, direct 0.1 0.0 - 0.2 MG/DL    Alk.  phosphatase 100 45 - 117 U/L    AST 55 (H) 15 - 37 U/L    ALT 64 (H) 13 - 56 U/L   MAGNESIUM    Collection Time: 01/17/17  4:30 AM   Result Value Ref Range    Magnesium 1.8 1.8 - 2.4 mg/dL   METABOLIC PANEL, BASIC    Collection Time: 01/17/17  4:30 AM   Result Value Ref Range    Sodium 143 136 - 145 mmol/L    Potassium 3.5 3.5 - 5.5 mmol/L    Chloride 98 (L) 100 - 108 mmol/L    CO2 38 (H) 21 - 32 mmol/L    Anion gap 7 3.0 - 18 mmol/L    Glucose 80 74 - 99 mg/dL    BUN 17 7.0 - 18 MG/DL    Creatinine 0.48 (L) 0.6 - 1.3 MG/DL    BUN/Creatinine ratio 35 (H) 12 - 20      GFR est AA >60 >60 ml/min/1.73m2    GFR est non-AA >60 >60 ml/min/1.73m2    Calcium 7.9 (L) 8.5 - 10.1 MG/DL   CBC WITH AUTOMATED DIFF    Collection Time: 01/17/17  4:30 AM   Result Value Ref Range    WBC 10.4 4.6 - 13.2 K/uL    RBC 2.93 (L) 4.20 - 5.30 M/uL    HGB 11.7 (L) 12.0 - 16.0 g/dL    HCT 34.0 (L) 35.0 - 45.0 %    .0 (H) 74.0 - 97.0 FL    MCH 39.9 (H) 24.0 - 34.0 PG    MCHC 34.4 31.0 - 37.0 g/dL    RDW 12.7 11.6 - 14.5 %    PLATELET 652 462 - 159 K/uL    MPV 9.8 9.2 - 11.8 FL    NEUTROPHILS 79 (H) 40 - 73 %    LYMPHOCYTES 10 (L) 21 - 52 %    MONOCYTES 10 3 - 10 %    EOSINOPHILS 1 0 - 5 %    BASOPHILS 0 0 - 2 %    ABS. NEUTROPHILS 8.3 (H) 1.8 - 8.0 K/UL    ABS. LYMPHOCYTES 1.0 0.9 - 3.6 K/UL    ABS. MONOCYTES 1.0 0.05 - 1.2 K/UL    ABS. EOSINOPHILS 0.1 0.0 - 0.4 K/UL    ABS. BASOPHILS 0.0 0.0 - 0.06 K/UL    DF AUTOMATED                 Discharge Medications:     Current Discharge Medication List      START taking these medications    Details   aspirin 81 mg chewable tablet Take 1 Tab by mouth daily. Qty: 30 Tab, Refills: 0      carvedilol (COREG) 3.125 mg tablet Take 1 Tab by mouth two (2) times daily (with meals). Qty: 60 Tab, Refills: 0      levoFLOXacin (LEVAQUIN) 500 mg tablet Take 1 Tab by mouth every twenty-four (24) hours. Qty: 5 Tab, Refills: 0      lisinopril (PRINIVIL, ZESTRIL) 2.5 mg tablet Take 1 Tab by mouth daily. Qty: 30 Tab, Refills: 0      potassium chloride (K-DUR, KLOR-CON) 20 mEq tablet Take 1 Tab by mouth two (2) times a day. Qty: 14 Tab, Refills: 0      predniSONE (DELTASONE) 10 mg tablet Take 2 tabs on day 1, take 1 tab on day 2, take 1 tab on day 3  Qty: 4 Tab, Refills: 0      furosemide (LASIX) 20 mg tablet Take 2 Tabs by mouth daily. Qty: 60 Tab, Refills: 0      albuterol (PROVENTIL HFA, VENTOLIN HFA, PROAIR HFA) 90 mcg/actuation inhaler Take 1 Puff by inhalation every four (4) hours as needed for Wheezing. Qty: 1 Inhaler, Refills: 0         CONTINUE these medications which have NOT CHANGED    Details   budesonide-formoterol (SYMBICORT) 160-4.5 mcg/actuation HFA inhaler Take 2 Puffs by inhalation two (2) times a day. Activity: Activity as tolerated    Diet: Regular Diet    Wound Care: None needed    Follow-up: PCP within 1 week, repeat CXR in 1 week.  Cardiology in 1 week  Pulmonology in 1-2 weeks    Discharge time: 50 minutes   Sriram Todd NP  1/17/2017, 11:12 AM      I examined the patient , reviewed the history, labs, and radiology reports  independently. I have reviewed the NP documentation, agree with the documentation, medical decision making and treatment plan as outlined by my NP.     Tammie Mcnally MD

## 2017-01-18 LAB
BACTERIA SPEC CULT: NORMAL
GRAM STN SPEC: NORMAL
GRAM STN SPEC: NORMAL
SERVICE CMNT-IMP: NORMAL

## 2017-01-24 ENCOUNTER — HOSPITAL ENCOUNTER (OUTPATIENT)
Dept: GENERAL RADIOLOGY | Age: 72
Discharge: HOME | End: 2017-01-24
Payer: MEDICARE

## 2017-01-24 DIAGNOSIS — J18.9 PNEUMONIA: ICD-10-CM

## 2017-01-24 PROCEDURE — 71020 XR CHEST PA LAT: CPT

## 2017-01-25 NOTE — ANCILLARY DISCHARGE INSTRUCTIONS
[facility]  Discharge Phone Call       After-Care Discharge Phone Call Questions: no answer    Were you able to get your prescriptions filled? Comment:      [] Yes  []No    Comment if answer is \"No\"   Are you taking your medication(s) as your doctor ordered? Do you understand the purpose of your medications? Comment:    [] Yes  []No    Comment if answer is \"No\"   Are you taking any other medications that are not on the list?  Comment:      [] Yes  []No    Comment if answer is \"Yes\"   Do you have any questions about your medications? Comment:    [] Yes  []No    Comment if answer is \"Yes\"   Did you make your follow-up appointments (if the hospital did not do this before  discharge)? Comment:    [] Yes  []No    Comment if answer is \"No\"   Is there any reason you might not be able to keep your follow-up appointments? Comment:     [] Yes  []No    Comment if answer is \"Yes\"   Do you have any questions about your care plan? Comment:    [] Yes  []No    Comment if answer is \"Yes\"   Do you have a good understanding of how you should manage your health? Comment:    [] Yes  []No    Comment if answer is \"Yes\"   Do you know which symptoms to watch for that would mean you would need to call your doctor right away? Comment:      [] Yes  []No    Comment if answer is \"No\"   Do you have any questions about the follow up process or any instructions that we have provided? Comment:    [] Yes  []No    Comment if answer is \"Yes\"   Did staff take your preferences into account?

## 2017-01-26 ENCOUNTER — APPOINTMENT (OUTPATIENT)
Dept: CT IMAGING | Age: 72
End: 2017-01-26
Attending: EMERGENCY MEDICINE
Payer: MEDICARE

## 2017-01-26 ENCOUNTER — HOSPITAL ENCOUNTER (EMERGENCY)
Age: 72
Discharge: HOME | End: 2017-01-26
Attending: EMERGENCY MEDICINE
Payer: MEDICARE

## 2017-01-26 VITALS
HEART RATE: 93 BPM | OXYGEN SATURATION: 96 % | SYSTOLIC BLOOD PRESSURE: 110 MMHG | RESPIRATION RATE: 27 BRPM | BODY MASS INDEX: 19.97 KG/M2 | TEMPERATURE: 97.9 F | DIASTOLIC BLOOD PRESSURE: 55 MMHG | WEIGHT: 120 LBS

## 2017-01-26 DIAGNOSIS — R05.9 COUGH: Primary | ICD-10-CM

## 2017-01-26 DIAGNOSIS — J90 PLEURAL EFFUSION, LEFT: ICD-10-CM

## 2017-01-26 LAB
ALBUMIN SERPL BCP-MCNC: 2.6 G/DL (ref 3.4–5)
ALBUMIN/GLOB SERPL: 0.7 {RATIO} (ref 0.8–1.7)
ALP SERPL-CCNC: 123 U/L (ref 45–117)
ALT SERPL-CCNC: 34 U/L (ref 13–56)
ANION GAP BLD CALC-SCNC: 9 MMOL/L (ref 3–18)
APPEARANCE UR: CLEAR
AST SERPL W P-5'-P-CCNC: 20 U/L (ref 15–37)
BASOPHILS # BLD AUTO: 0 K/UL (ref 0–0.06)
BASOPHILS # BLD: 0 % (ref 0–2)
BILIRUB SERPL-MCNC: 0.2 MG/DL (ref 0.2–1)
BILIRUB UR QL: NEGATIVE
BNP SERPL-MCNC: 465 PG/ML (ref 0–900)
BUN SERPL-MCNC: 19 MG/DL (ref 7–18)
BUN/CREAT SERPL: 29 (ref 12–20)
CALCIUM SERPL-MCNC: 8.6 MG/DL (ref 8.5–10.1)
CHLORIDE SERPL-SCNC: 102 MMOL/L (ref 100–108)
CK MB CFR SERPL CALC: 5.8 % (ref 0–4)
CK MB SERPL-MCNC: 1.8 NG/ML (ref 0.5–3.6)
CK SERPL-CCNC: 31 U/L (ref 26–192)
CO2 SERPL-SCNC: 29 MMOL/L (ref 21–32)
COLOR UR: YELLOW
CREAT SERPL-MCNC: 0.65 MG/DL (ref 0.6–1.3)
DIFFERENTIAL METHOD BLD: ABNORMAL
EOSINOPHIL # BLD: 0.4 K/UL (ref 0–0.4)
EOSINOPHIL NFR BLD: 4 % (ref 0–5)
ERYTHROCYTE [DISTWIDTH] IN BLOOD BY AUTOMATED COUNT: 12.8 % (ref 11.6–14.5)
GLOBULIN SER CALC-MCNC: 3.7 G/DL (ref 2–4)
GLUCOSE SERPL-MCNC: 118 MG/DL (ref 74–99)
GLUCOSE UR STRIP.AUTO-MCNC: NEGATIVE MG/DL
HCT VFR BLD AUTO: 39.8 % (ref 35–45)
HGB BLD-MCNC: 13.6 G/DL (ref 12–16)
HGB UR QL STRIP: NEGATIVE
INR PPP: 0.8 (ref 0.8–1.2)
KETONES UR QL STRIP.AUTO: NEGATIVE MG/DL
LACTATE BLD-SCNC: 1.2 MMOL/L (ref 0.4–2)
LEUKOCYTE ESTERASE UR QL STRIP.AUTO: NEGATIVE
LYMPHOCYTES # BLD AUTO: 14 % (ref 21–52)
LYMPHOCYTES # BLD: 1.5 K/UL (ref 0.9–3.6)
MCH RBC QN AUTO: 39 PG (ref 24–34)
MCHC RBC AUTO-ENTMCNC: 34.2 G/DL (ref 31–37)
MCV RBC AUTO: 114 FL (ref 74–97)
MONOCYTES # BLD: 1 K/UL (ref 0.05–1.2)
MONOCYTES NFR BLD AUTO: 10 % (ref 3–10)
NEUTS SEG # BLD: 7.7 K/UL (ref 1.8–8)
NEUTS SEG NFR BLD AUTO: 72 % (ref 40–73)
NITRITE UR QL STRIP.AUTO: NEGATIVE
PH UR STRIP: 5 [PH] (ref 5–8)
PLATELET # BLD AUTO: 386 K/UL (ref 135–420)
PMV BLD AUTO: 9.5 FL (ref 9.2–11.8)
POTASSIUM SERPL-SCNC: 4 MMOL/L (ref 3.5–5.5)
PROT SERPL-MCNC: 6.3 G/DL (ref 6.4–8.2)
PROT UR STRIP-MCNC: NEGATIVE MG/DL
PROTHROMBIN TIME: 11.1 SEC (ref 11.5–15.2)
RBC # BLD AUTO: 3.49 M/UL (ref 4.2–5.3)
SODIUM SERPL-SCNC: 140 MMOL/L (ref 136–145)
SP GR UR REFRACTOMETRY: 1.02 (ref 1–1.03)
TROPONIN I SERPL-MCNC: <0.02 NG/ML (ref 0–0.04)
UROBILINOGEN UR QL STRIP.AUTO: 0.2 EU/DL (ref 0.2–1)
WBC # BLD AUTO: 10.7 K/UL (ref 4.6–13.2)

## 2017-01-26 PROCEDURE — 80053 COMPREHEN METABOLIC PANEL: CPT | Performed by: EMERGENCY MEDICINE

## 2017-01-26 PROCEDURE — 81003 URINALYSIS AUTO W/O SCOPE: CPT | Performed by: EMERGENCY MEDICINE

## 2017-01-26 PROCEDURE — 85610 PROTHROMBIN TIME: CPT | Performed by: EMERGENCY MEDICINE

## 2017-01-26 PROCEDURE — 82550 ASSAY OF CK (CPK): CPT | Performed by: EMERGENCY MEDICINE

## 2017-01-26 PROCEDURE — 99285 EMERGENCY DEPT VISIT HI MDM: CPT

## 2017-01-26 PROCEDURE — 83880 ASSAY OF NATRIURETIC PEPTIDE: CPT | Performed by: EMERGENCY MEDICINE

## 2017-01-26 PROCEDURE — 87040 BLOOD CULTURE FOR BACTERIA: CPT | Performed by: EMERGENCY MEDICINE

## 2017-01-26 PROCEDURE — 83605 ASSAY OF LACTIC ACID: CPT

## 2017-01-26 PROCEDURE — 85025 COMPLETE CBC W/AUTO DIFF WBC: CPT | Performed by: EMERGENCY MEDICINE

## 2017-01-26 PROCEDURE — 71250 CT THORAX DX C-: CPT

## 2017-01-26 PROCEDURE — 93005 ELECTROCARDIOGRAM TRACING: CPT

## 2017-01-26 NOTE — ED NOTES
I performed a brief evaluation, including history and physical, of the patient here in triage and I have determined that pt will need further treatment and evaluation from the main side ER physician. I have placed initial orders to help in expediting patients care. January 26, 2017 at 5:36 PM - Yael Adams MD        Visit Vitals    BP 99/57 (BP 1 Location: Right arm, BP Patient Position: At rest)    Pulse 87    Temp 97.9 °F (36.6 °C)    Resp 17    Wt 54.4 kg (120 lb)    SpO2 95%    BMI 19.97 kg/m2          Patient called by PCP and Dr. Maria E Mak per patient and instructed to return to hospital due to worsening of her xray. She denied feeling increasingly more SOB  LL MD    XR Results (most recent):    Results from Hospital Encounter encounter on 01/24/17   XR CHEST PA LAT   Narrative EXAMINATION: Chest PA and lateral    INDICATION: Follow-up pneumonia. COMPARISONS: Multiple chest films, most recent 1/17/2017. FINDINGS:  The cardiomediastinal silhouette is stable in size and contour. The pulmonary  vasculature is unremarkable. Interval increase in density of right mid to lower  lung patchy consolidation with increased size of right pleural effusion. Interval decrease/near resolution of left-sided pleural effusion. Biapical  pleural thickening similar to prior, likely scarring. Mild degenerative changes  in the thoracic spine. Impression IMPRESSION:  1. Interval increase in size of right pleural effusion with associated increased  consolidation of the mid to lower right lung, which may represent component of  atelectasis and/or worsening pneumonia. 2. Significant interval improvement of left-sided pleural effusion. As attending radiologist I have assessed the study images, and dictated or  reviewed/edited the final report as needed.

## 2017-01-26 NOTE — ED TRIAGE NOTES
Just discharged from hospital Tuesday after stay for pneumonia.  Had repeat chest xray and received call to return to ED , chest xray worse

## 2017-01-26 NOTE — ED PROVIDER NOTES
HPI Comments: 6:09 PM Tsering Castillo is a 70 y.o. female with a history of COPD who presents to the ED complaining of cough. Patient states that she was discharged from the hospital two days ago after being admitted for pneumonia. She states that she had a repeat CXR and received a call from her PCP today instructing her to return to the ED because her CXR was worse. Patient states she is feeling better than she was during her stay in the hospital. Patient also c/o SOB, but states that it has not become worse than it was while she was in the hospital. Patient denies fever, chills, or any other symptoms. There are no other concerns at this time. PCP: Lorenzo Iraheta MD    The history is provided by the patient. Past Medical History:   Diagnosis Date    Chronic obstructive pulmonary disease ([facility] [address]        Past Surgical History:   Procedure Laterality Date    Hx gyn      Pr abdomen surgery proc unlisted           History reviewed. No pertinent family history. Social History     Social History    Marital status:      Spouse name: N/A    Number of children: N/A    Years of education: N/A     Occupational History    Not on file. Social History Main Topics    Smoking status: Current Every Day Smoker     Packs/day: 0.50    Smokeless tobacco: Not on file    Alcohol use 8.4 oz/week     14 Glasses of wine per week    Drug use: No    Sexual activity: Not on file     Other Topics Concern    Not on file     Social History Narrative         ALLERGIES: Review of patient's allergies indicates no known allergies. Review of Systems   Constitutional: Negative. Negative for chills and fever. HENT: Negative. Eyes: Negative. Respiratory: Positive for cough and shortness of breath. Cardiovascular: Negative. Gastrointestinal: Negative. Endocrine: Negative. Genitourinary: Negative. Musculoskeletal: Negative. Skin: Negative. Allergic/Immunologic: Negative. Neurological: Negative. Hematological: Negative. Psychiatric/Behavioral: Negative. Vitals:    01/26/17 1734   BP: 99/57   Pulse: 87   Resp: 17   Temp: 97.9 °F (36.6 °C)   SpO2: 95%   Weight: 54.4 kg (120 lb)            Physical Exam   Constitutional: She is oriented to person, place, and time. She appears well-developed and well-nourished. No distress. HENT:   Head: Normocephalic and atraumatic. Mouth/Throat: Oropharynx is clear and moist.   Eyes: Conjunctivae and EOM are normal. Pupils are equal, round, and reactive to light. No scleral icterus. Neck: Normal range of motion. Neck supple. Cardiovascular: Normal rate, regular rhythm and normal heart sounds. No murmur heard. Pulmonary/Chest: Effort normal. No respiratory distress. She has rhonchi in the right lower field. Abdominal: Soft. Bowel sounds are normal. She exhibits no distension. There is no tenderness. Musculoskeletal: She exhibits no edema. Lymphadenopathy:     She has no cervical adenopathy. Neurological: She is alert and oriented to person, place, and time. Coordination normal.   Skin: Skin is warm and dry. No rash noted. Psychiatric: She has a normal mood and affect. Her behavior is normal.   Nursing note and vitals reviewed.        MDM  Number of Diagnoses or Management Options  HCAP (healthcare-associated pneumonia):   Diagnosis management comments: Referred to ED for evaluation of possible increased pneumonia and pleural effusion pt has no respiratory distress or fever      Ekg: nsr rate 93 no acute st changes    Pt stable in ED care assumed by Dr Tamera Hairston for further evaluation and follow up          Amount and/or Complexity of Data Reviewed  Clinical lab tests: ordered  Tests in the radiology section of CPT®: ordered      ED Course       Procedures    Vitals:  Patient Vitals for the past 12 hrs:   Temp Pulse Resp BP SpO2   01/26/17 1734 97.9 °F (36.6 °C) 87 17 99/57 95 %   95% on RA, indicating adequate oxygenation. Medications ordered:   Medications - No data to display      Lab findings:  Recent Results (from the past 12 hour(s))   POC LACTIC ACID    Collection Time: 01/26/17  6:38 PM   Result Value Ref Range    Lactic Acid (POC) 1.2 0.4 - 2.0 mmol/L   EKG, 12 LEAD, INITIAL    Collection Time: 01/26/17  6:50 PM   Result Value Ref Range    Ventricular Rate 93 BPM    Atrial Rate 93 BPM    P-R Interval 126 ms    QRS Duration 82 ms    Q-T Interval 376 ms    QTC Calculation (Bezet) 467 ms    Calculated P Axis 78 degrees    Calculated R Axis 63 degrees    Calculated T Axis 83 degrees    Diagnosis       Sinus rhythm with premature supraventricular complexes  Otherwise normal ECG  When compared with ECG of 16-JAN-2017 08:53,  premature supraventricular complexes are now present  Incomplete right bundle branch block is no longer present  T wave inversion less evident in Anterolateral leads           X-Ray, CT or other radiology findings or impressions:  No orders to display       Progress notes, Consult notes or additional Procedure notes:         Disposition:  Diagnosis:   1. HCAP (healthcare-associated pneumonia)        Disposition:     Follow-up Information     None           Patient's Medications   Start Taking    No medications on file   Continue Taking    ALBUTEROL (PROVENTIL HFA, VENTOLIN HFA, PROAIR HFA) 90 MCG/ACTUATION INHALER    Take 1 Puff by inhalation every four (4) hours as needed for Wheezing. ASPIRIN 81 MG CHEWABLE TABLET    Take 1 Tab by mouth daily. BUDESONIDE-FORMOTEROL (SYMBICORT) 160-4.5 MCG/ACTUATION HFA INHALER    Take 2 Puffs by inhalation two (2) times a day. CARVEDILOL (COREG) 3.125 MG TABLET    Take 1 Tab by mouth two (2) times daily (with meals). FUROSEMIDE (LASIX) 20 MG TABLET    Take 2 Tabs by mouth daily. LISINOPRIL (PRINIVIL, ZESTRIL) 2.5 MG TABLET    Take 1 Tab by mouth daily.     POTASSIUM CHLORIDE (K-DUR, KLOR-CON) 20 MEQ TABLET    Take 1 Tab by mouth two (2) times a day.    PREDNISONE (DELTASONE) 10 MG TABLET    Take 2 tabs on day 1, take 1 tab on day 2, take 1 tab on day 3   These Medications have changed    No medications on file   Stop Taking    LEVOFLOXACIN (LEVAQUIN) 500 MG TABLET    Take 1 Tab by mouth every twenty-four (24) hours. Scribe Attestation:    Regina Shields, scribing for and in the presence of Ovidio Domingo MD January 26, 2017 at 6:08 PM     Physician Attestation:   I personally performed the services described in this documentation, reviewed and edited the documentation which was dictated to the scribe in my presence, and it accurately records my words and actions.  Ovidio Domingo MD  January 26, 2017 at 6:08 PM     Signed by: Hugh Piña, January 26, 2017,  6:08 PM

## 2017-01-27 ENCOUNTER — OFFICE VISIT (OUTPATIENT)
Dept: CARDIOLOGY CLINIC | Age: 72
End: 2017-01-27

## 2017-01-27 VITALS
WEIGHT: 115 LBS | OXYGEN SATURATION: 92 % | BODY MASS INDEX: 19.16 KG/M2 | HEART RATE: 109 BPM | DIASTOLIC BLOOD PRESSURE: 63 MMHG | HEIGHT: 65 IN | SYSTOLIC BLOOD PRESSURE: 87 MMHG

## 2017-01-27 DIAGNOSIS — J90 PLEURAL EFFUSION: Primary | ICD-10-CM

## 2017-01-27 DIAGNOSIS — I95.2 HYPOTENSION DUE TO DRUGS: ICD-10-CM

## 2017-01-27 DIAGNOSIS — J18.9 PNEUMONIA OF RIGHT LUNG DUE TO INFECTIOUS ORGANISM, UNSPECIFIED PART OF LUNG: ICD-10-CM

## 2017-01-27 RX ORDER — FUROSEMIDE 20 MG/1
20 TABLET ORAL EVERY OTHER DAY
Qty: 30 TAB | Refills: 5 | Status: SHIPPED | OUTPATIENT
Start: 2017-01-27 | End: 2017-02-11 | Stop reason: SDUPTHER

## 2017-01-27 RX ORDER — FUROSEMIDE 20 MG/1
20 TABLET ORAL DAILY
COMMUNITY
End: 2017-01-27 | Stop reason: SDUPTHER

## 2017-01-27 NOTE — MR AVS SNAPSHOT
Visit Information Date & Time Provider Department Dept. Phone Encounter #  
 1/27/2017 11:15 AM MD Rachel Lombardo [facility]ist at [address] [place] [address] [phone] [id number] Follow-up Instructions Return in about 4 weeks (around 2/24/2017). Upcoming Health Maintenance Date Due Hepatitis C Screening 1945 DTaP/Tdap/Td series (1 - Tdap) 5/12/1966 BREAST CANCER SCRN MAMMOGRAM 5/12/1995 FOBT Q 1 YEAR AGE 50-75 5/12/1995 ZOSTER VACCINE AGE 60> 5/12/2005 GLAUCOMA SCREENING Q2Y 5/12/2010 OSTEOPOROSIS SCREENING (DEXA) 5/12/2010 Pneumococcal 65+ Low/Medium Risk (1 of 2 - PCV13) 5/12/2010 MEDICARE YEARLY EXAM 5/12/2010 Allergies as of 1/27/2017  Review Complete On: 1/27/2017 By: Camron Cavazos LPN Severity Noted Reaction Type Reactions Penicillins  01/27/2017    Unknown (comments) Per patient, 40 years ago, but has not taken since Current Immunizations  Never Reviewed Name Date Influenza Vaccine (Quad) PF 1/9/2017  9:33 PM  
  
 Not reviewed this visit Vitals BP Pulse Height(growth percentile) Weight(growth percentile) SpO2 BMI  
 (!) 87/63 (!) 109 5' 5\" (1.651 m) 115 lb (52.2 kg) 92% 19.14 kg/m2 OB Status Smoking Status Hysterectomy Current Every Day Smoker BMI and BSA Data Body Mass Index Body Surface Area  
 19.14 kg/m 2 1.55 m 2 Preferred Pharmacy Pharmacy Name Phone [facility] [phone] Your Updated Medication List  
  
   
This list is accurate as of: 1/27/17 12:01 PM.  Always use your most recent med list.  
  
  
  
  
 albuterol 90 mcg/actuation inhaler Commonly known as:  PROVENTIL HFA, VENTOLIN HFA, PROAIR HFA Take 1 Puff by inhalation every four (4) hours as needed for Wheezing. aspirin 81 mg chewable tablet Take 1 Tab by mouth daily. carvedilol 3.125 mg tablet Commonly known as:  Yvonne Buster Take 1 Tab by mouth two (2) times daily (with meals). LASIX 20 mg tablet Generic drug:  furosemide Take 20 mg by mouth daily. lisinopril 2.5 mg tablet Commonly known as:  Paul Pares Take 1 Tab by mouth daily. SYMBICORT 160-4.5 mcg/actuation HFA inhaler Generic drug:  budesonide-formoterol Take 2 Puffs by inhalation two (2) times a day. Follow-up Instructions Return in about 4 weeks (around 2/24/2017). Patient Instructions Lasix on even days Introducing [facility]! [employer] introduces [employer] patient portal. Now you can access parts of your medical record, email your doctor's office, and request medication refills online. 1. In your internet browser, go to [email or web address] 2. Click on the First Time User? Click Here link in the Sign In box. You will see the New Member Sign Up page. 3. Enter your [employer] Access Code exactly as it appears below. You will not need to use this code after youve completed the sign-up process. If you do not sign up before the expiration date, you must request a new code. · [employer] Access Code: [email or web address] Expires: 4/5/2017  5:08 PM 
 
4. Enter the last four digits of your Social Security Number (xxxx) and Date of Birth (mm/dd/yyyy) as indicated and click Submit. You will be taken to the next sign-up page. 5. Create a [employer] ID. This will be your [employer] login ID and cannot be changed, so think of one that is secure and easy to remember. 6. Create a [employer] password. You can change your password at any time. 7. Enter your Password Reset Question and Answer. This can be used at a later time if you forget your password. 8. Enter your e-mail address. You will receive e-mail notification when new information is available in [address] 9. Click Sign Up. You can now view and download portions of your medical record. 10. Click the Download Summary menu link to download a portable copy of your medical information. If you have questions, please visit the Frequently Asked Questions section of the [employer] website. Remember, [employer] is NOT to be used for urgent needs. For medical emergencies, dial 911. Now available from your iPhone and Android! Please provide this summary of care documentation to your next provider. Your primary care clinician is listed as CARISA Negro. If you have any questions after today's visit, please call [phone].

## 2017-01-27 NOTE — ED NOTES
Vitals:  Patient Vitals for the past 12 hrs:   Temp Pulse Resp BP SpO2   01/26/17 2015 - 91 27 104/49 96 %   01/1945 - 99 25 110/57 -   01/26/17 1915 - 94 26 95/55 -   01/26/17 1845 - 94 26 93/52 -   01/26/17 1830 - 96 21 (!) 87/44 -   01/26/17 1829 - 95 26 - 94 %   01/26/17 1818 - - - (!) 86/48 94 %   01/26/17 1734 97.9 °F (36.6 °C) 87 17 99/57 95 %         Medications ordered:   Medications - No data to display      Lab findings:  Recent Results (from the past 12 hour(s))   PROTHROMBIN TIME + INR    Collection Time: 01/26/17  6:35 PM   Result Value Ref Range    Prothrombin time 11.1 (L) 11.5 - 15.2 sec    INR 0.8 0.8 - 1.2     CBC WITH AUTOMATED DIFF    Collection Time: 01/26/17  6:35 PM   Result Value Ref Range    WBC 10.7 4.6 - 13.2 K/uL    RBC 3.49 (L) 4.20 - 5.30 M/uL    HGB 13.6 12.0 - 16.0 g/dL    HCT 39.8 35.0 - 45.0 %    .0 (H) 74.0 - 97.0 FL    MCH 39.0 (H) 24.0 - 34.0 PG    MCHC 34.2 31.0 - 37.0 g/dL    RDW 12.8 11.6 - 14.5 %    PLATELET 557 587 - 227 K/uL    MPV 9.5 9.2 - 11.8 FL    NEUTROPHILS 72 40 - 73 %    LYMPHOCYTES 14 (L) 21 - 52 %    MONOCYTES 10 3 - 10 %    EOSINOPHILS 4 0 - 5 %    BASOPHILS 0 0 - 2 %    ABS. NEUTROPHILS 7.7 1.8 - 8.0 K/UL    ABS. LYMPHOCYTES 1.5 0.9 - 3.6 K/UL    ABS. MONOCYTES 1.0 0.05 - 1.2 K/UL    ABS. EOSINOPHILS 0.4 0.0 - 0.4 K/UL    ABS.  BASOPHILS 0.0 0.0 - 0.06 K/UL    DF AUTOMATED     METABOLIC PANEL, COMPREHENSIVE    Collection Time: 01/26/17  6:35 PM   Result Value Ref Range    Sodium 140 136 - 145 mmol/L    Potassium 4.0 3.5 - 5.5 mmol/L    Chloride 102 100 - 108 mmol/L    CO2 29 21 - 32 mmol/L    Anion gap 9 3.0 - 18 mmol/L    Glucose 118 (H) 74 - 99 mg/dL    BUN 19 (H) 7.0 - 18 MG/DL    Creatinine 0.65 0.6 - 1.3 MG/DL    BUN/Creatinine ratio 29 (H) 12 - 20      GFR est AA >60 >60 ml/min/1.73m2    GFR est non-AA >60 >60 ml/min/1.73m2    Calcium 8.6 8.5 - 10.1 MG/DL    Bilirubin, total 0.2 0.2 - 1.0 MG/DL    ALT 34 13 - 56 U/L    AST 20 15 - 37 U/L Alk. phosphatase 123 (H) 45 - 117 U/L    Protein, total 6.3 (L) 6.4 - 8.2 g/dL    Albumin 2.6 (L) 3.4 - 5.0 g/dL    Globulin 3.7 2.0 - 4.0 g/dL    A-G Ratio 0.7 (L) 0.8 - 1.7     CARDIAC PANEL,(CK, CKMB & TROPONIN)    Collection Time: 01/26/17  6:35 PM   Result Value Ref Range    CK 31 26 - 192 U/L    CK - MB 1.8 0.5 - 3.6 ng/ml    CK-MB Index 5.8 (H) 0.0 - 4.0 %    Troponin-I, Qt. <0.02 0.0 - 0.045 NG/ML   PRO-BNP    Collection Time: 01/26/17  6:35 PM   Result Value Ref Range    NT pro- 0 - 900 PG/ML   POC LACTIC ACID    Collection Time: 01/26/17  6:38 PM   Result Value Ref Range    Lactic Acid (POC) 1.2 0.4 - 2.0 mmol/L   URINALYSIS W/ RFLX MICROSCOPIC    Collection Time: 01/26/17  6:50 PM   Result Value Ref Range    Color YELLOW      Appearance CLEAR      Specific gravity 1.025 1.005 - 1.030      pH (UA) 5.0 5.0 - 8.0      Protein NEGATIVE  NEG mg/dL    Glucose NEGATIVE  NEG mg/dL    Ketone NEGATIVE  NEG mg/dL    Bilirubin NEGATIVE  NEG      Blood NEGATIVE  NEG      Urobilinogen 0.2 0.2 - 1.0 EU/dL    Nitrites NEGATIVE  NEG      Leukocyte Esterase NEGATIVE  NEG     EKG, 12 LEAD, INITIAL    Collection Time: 01/26/17  6:50 PM   Result Value Ref Range    Ventricular Rate 93 BPM    Atrial Rate 93 BPM    P-R Interval 126 ms    QRS Duration 82 ms    Q-T Interval 376 ms    QTC Calculation (Bezet) 467 ms    Calculated P Axis 78 degrees    Calculated R Axis 63 degrees    Calculated T Axis 83 degrees    Diagnosis       Sinus rhythm with premature supraventricular complexes  Otherwise normal ECG  When compared with ECG of 16-JAN-2017 08:53,  premature supraventricular complexes are now present  Incomplete right bundle branch block is no longer present  T wave inversion less evident in Anterolateral leads         EKG interpretation by ED Physician:      X-Ray, CT or other radiology findings or impressions:  CT CHEST WO CONT    (Results Pending)   Patchy opacities at the left lower lung associated with a small left sided pleural effusion. Small mediastinal LN. DDX includes compressive atelectasis vs consolidation vs a mix of both      Progress notes, Consult notes or additional Procedure notes:   Pt has no worsening thomson, fever, leukocytosis, evidence of acute chf.   D/w pt and  results, rec treatment plan, f/u. She has rx for abx that was called in by pcp pa    Reevaluation of patient:   Stable for d/c     Disposition:  Diagnosis:   1. Cough    2. Pleural effusion, left        Disposition: home      Follow-up Information     Follow up With Details Comments [facility] Diana Batista MD   [address] [address] [phone]      Gerald Camp MD Schedule an appointment as soon as possible for a visit  [address]  [phone]      keep appointment with your cardiologist as scheduled       [facility] EMERGENCY DEPT  If symptoms worsen or for any fever, new shortness of breath, vomiting, chest pain or any other concern [address]  [phone]            Patient's Medications   Start Taking    No medications on file   Continue Taking    ALBUTEROL (PROVENTIL HFA, VENTOLIN HFA, PROAIR HFA) 90 MCG/ACTUATION INHALER    Take 1 Puff by inhalation every four (4) hours as needed for Wheezing. ASPIRIN 81 MG CHEWABLE TABLET    Take 1 Tab by mouth daily. BUDESONIDE-FORMOTEROL (SYMBICORT) 160-4.5 MCG/ACTUATION HFA INHALER    Take 2 Puffs by inhalation two (2) times a day. CARVEDILOL (COREG) 3.125 MG TABLET    Take 1 Tab by mouth two (2) times daily (with meals). FUROSEMIDE (LASIX) 20 MG TABLET    Take 2 Tabs by mouth daily. LISINOPRIL (PRINIVIL, ZESTRIL) 2.5 MG TABLET    Take 1 Tab by mouth daily. POTASSIUM CHLORIDE (K-DUR, KLOR-CON) 20 MEQ TABLET    Take 1 Tab by mouth two (2) times a day.     PREDNISONE (DELTASONE) 10 MG TABLET    Take 2 tabs on day 1, take 1 tab on day 2, take 1 tab on day 3   These Medications have changed    No medications on file   Stop Taking    LEVOFLOXACIN (LEVAQUIN) 500 MG TABLET    Take 1 Tab by mouth every twenty-four (24) hours.

## 2017-01-27 NOTE — LETTER
3/17/2017 Patient:  Alison Arnold YOB: 1945 Date of Visit: 1/27/2017 Dear Giovani Blue MD 
[address] [place] [address],[address] 1 [facility] [address] [id number] VIA Facsimile: [phone] 
 : Thank you for referring Ms. Hailee Mckeon to me for evaluation/treatment. Below are the relevant portions of my assessment and plan of care. Subjective:  
   Alison Arnold is in the office today for cardiac reevaluation. She is a 79-year-old woman that was recently hospitalized at [facility] for pneumonia. During the course of her evaluation, she had an echocardiogram, which demonstrated marked deficit of left ventricular systolic function. Her ejection fraction was 30-35%. She also had grade I diastolic dysfunction. She had no prior history of known cardiomyopathy. Prior to her discharge, a nuclear stress test was done to rule out an ischemic basis for her cardiomyopathy. She had no significant ongoing ischemia and her ejection fraction was 69%. That particular study was done on 01/16/2017. She was discharged on low-dose betablocker in the form of carvedilol, low-dose ACE inhibitor in the form of Lisinopril and Lasix 40 mg per day. Recently, the PA at Dr. Schuyler Simmons office reduced her Lasix from 40 mg to 20 mg. In the office today, her blood pressure is 90 systolic. She is not having any dizziness or lightheadedness. She has some shortness of breath at rest and with activity. She has had no PND. She has been sleeping a lot during the day. She returned to the emergency department on 01/26 after she had a chest x-ray that showed possible worsening of her pneumonia. She was to restarted on Levaquin. She is in the office today for followup in that regard. Patient Active Problem List  
 Diagnosis Date Noted  Pleural effusion 02/05/2017  Hypotension due to drugs 02/05/2017  Pneumonia 01/05/2017  Sepsis ([facility] [address] 01/05/2017 Current Outpatient Prescriptions Medication Sig Dispense Refill  furosemide (LASIX) 20 mg tablet Take 1 Tab by mouth every other day. 30 Tab 5  
 aspirin 81 mg chewable tablet Take 1 Tab by mouth daily. 30 Tab 0  carvedilol (COREG) 3.125 mg tablet Take 1 Tab by mouth two (2) times daily (with meals). 60 Tab 0  
 lisinopril (PRINIVIL, ZESTRIL) 2.5 mg tablet Take 1 Tab by mouth daily. 30 Tab 0  
 albuterol (PROVENTIL HFA, VENTOLIN HFA, PROAIR HFA) 90 mcg/actuation inhaler Take 1 Puff by inhalation every four (4) hours as needed for Wheezing. 1 Inhaler 0  
 budesonide-formoterol (SYMBICORT) 160-4.5 mcg/actuation HFA inhaler Take 2 Puffs by inhalation two (2) times a day. Allergies Allergen Reactions  Penicillins Unknown (comments) Per patient, 40 years ago, but has not taken since Past Medical History Diagnosis Date  Chronic obstructive pulmonary disease ([facility] [address] Past Surgical History Procedure Laterality Date  Hx gyn  Pr abdomen surgery proc unlisted No family history on file. History Smoking Status  Current Every Day Smoker  Packs/day: 0.50 Smokeless Tobacco  
 Not on file Review of Systems, additional: 
Constitutional: negative Eyes: negative Respiratory: positive for dyspnea on exertion Cardiovascular: negative Gastrointestinal: negative Musculoskeletal:negative Neurological: negative Behvioral/Psych: negative Endocrine: negative ENT: negative Objective:  
 
Visit Vitals  BP (!) 87/63  Pulse (!) 109  Ht 5' 5\" (1.651 m)  Wt 52.2 kg (115 lb)  SpO2 92%  BMI 19.14 kg/m2 General:  alert, cooperative, no distress Chest Wall: inspection normal - no chest wall deformities or tenderness, respiratory effort normal  
Lung: diminished breath sounds R base Heart:  normal rate and regular rhythm, S1 and S2 normal, no gallops noted Abdomen: soft, non-tender. Bowel sounds normal. No masses,  no organomegaly Extremities: extremities normal, atraumatic, no cyanosis or edema Skin: no rashes Neuro: alert, oriented, normal speech, no focal findings or movement disorder noted Assessment/Plan: ICD-10-CM ICD-9-CM 1. Pleural effusion, right J90 511.9 2. Hypotension due to drugs. EF by echo while hospitalized was 30 to 35%, follow up nuclear scan EF was 69% with no ischemia. Will DC ACEi , change lasix to every other day, and repeat echo in 3 weeks just prior to appointment anf if normal DC carvedilol and presume transient systolic dysfunction was related to underlying pneumonia I95.2 458.8 E980.5 3. Pneumonia of right lung due to infectious organism, unspecified part of lung J18.9 483.8 If you have questions, please do not hesitate to call me. I look forward to following Ms. Mckeon along with you. Sincerely, Any Zheng MD

## 2017-01-27 NOTE — PROGRESS NOTES
1. Have you been to the ER, urgent care clinic since your last visit? Hospitalized since your last visit? Yes When: 1/26/17 Where: [facility] ER Reason for visit: Pneumonia still present per Pulmonologist/ hasn't seen yet    2. Have you seen or consulted any other health care providers outside of the [address] [place] [address] since your last visit? Include any pap smears or colon screening.  No

## 2017-01-28 LAB
ATRIAL RATE: 93 BPM
CALCULATED P AXIS, ECG09: 78 DEGREES
CALCULATED R AXIS, ECG10: 63 DEGREES
CALCULATED T AXIS, ECG11: 83 DEGREES
DIAGNOSIS, 93000: NORMAL
P-R INTERVAL, ECG05: 126 MS
Q-T INTERVAL, ECG07: 376 MS
QRS DURATION, ECG06: 82 MS
QTC CALCULATION (BEZET), ECG08: 467 MS
VENTRICULAR RATE, ECG03: 93 BPM

## 2017-02-01 LAB
BACTERIA SPEC CULT: NORMAL
BACTERIA SPEC CULT: NORMAL
SERVICE CMNT-IMP: NORMAL
SERVICE CMNT-IMP: NORMAL

## 2017-02-05 PROBLEM — J90 PLEURAL EFFUSION: Status: ACTIVE | Noted: 2017-02-05

## 2017-02-05 PROBLEM — I95.2 HYPOTENSION DUE TO DRUGS: Status: ACTIVE | Noted: 2017-02-05

## 2017-02-05 NOTE — PROGRESS NOTES
Subjective:      Edin Black is in the office today for cardiac reevaluation. She is a 71-year-old woman that was recently hospitalized at [facility]\"" for pneumonia. During the course of her evaluation, she had an echocardiogram, which demonstrated marked deficit of left ventricular systolic function. Her ejection fraction was 30-35%. She also had grade I diastolic dysfunction. She had no prior history of known cardiomyopathy. Prior to her discharge, a nuclear stress test was done to rule out an ischemic basis for her cardiomyopathy. She had no significant ongoing ischemia and her ejection fraction was 69%. That particular study was done on 01/16/2017. She was discharged on low-dose betablocker in the form of carvedilol, low-dose ACE inhibitor in the form of Lisinopril and Lasix 40 mg per day. Recently, the PA at Dr. Reynaldo Davis office reduced her Lasix from 40 mg to 20 mg. In the office today, her blood pressure is 90 systolic. She is not having any dizziness or lightheadedness. She has some shortness of breath at rest and with activity. She has had no PND. She has been sleeping a lot during the day. She returned to the emergency department on 01/26 after she had a chest x-ray that showed possible worsening of her pneumonia. She was to restarted on Levaquin. She is in the office today for followup in that regard. Patient Active Problem List    Diagnosis Date Noted    Pleural effusion 02/05/2017    Hypotension due to drugs 02/05/2017    Pneumonia 01/05/2017    Sepsis ([facility] 01/05/2017     Current Outpatient Prescriptions   Medication Sig Dispense Refill    furosemide (LASIX) 20 mg tablet Take 1 Tab by mouth every other day. 30 Tab 5    aspirin 81 mg chewable tablet Take 1 Tab by mouth daily. 30 Tab 0    carvedilol (COREG) 3.125 mg tablet Take 1 Tab by mouth two (2) times daily (with meals).  60 Tab 0    lisinopril (Nilsa Reasons) 2.5 mg tablet Take 1 Tab by mouth daily. 30 Tab 0    albuterol (PROVENTIL HFA, VENTOLIN HFA, PROAIR HFA) 90 mcg/actuation inhaler Take 1 Puff by inhalation every four (4) hours as needed for Wheezing. 1 Inhaler 0    budesonide-formoterol (SYMBICORT) 160-4.5 mcg/actuation HFA inhaler Take 2 Puffs by inhalation two (2) times a day. Allergies   Allergen Reactions    Penicillins Unknown (comments)     Per patient, 40 years ago, but has not taken since     Past Medical History   Diagnosis Date    Chronic obstructive pulmonary disease ([facility] [address]      Past Surgical History   Procedure Laterality Date    Hx gyn      Pr abdomen surgery proc unlisted       No family history on file. History   Smoking Status    Current Every Day Smoker    Packs/day: 0.50   Smokeless Tobacco    Not on file          Review of Systems, additional:  Constitutional: negative  Eyes: negative  Respiratory: positive for dyspnea on exertion  Cardiovascular: negative  Gastrointestinal: negative  Musculoskeletal:negative  Neurological: negative  Behvioral/Psych: negative  Endocrine: negative  ENT: negative    Objective:     Visit Vitals    BP (!) 87/63    Pulse (!) 109    Ht 5' 5\" (1.651 m)    Wt 52.2 kg (115 lb)    SpO2 92%    BMI 19.14 kg/m2     General:  alert, cooperative, no distress   Chest Wall: inspection normal - no chest wall deformities or tenderness, respiratory effort normal   Lung: diminished breath sounds R base   Heart:  normal rate and regular rhythm, S1 and S2 normal, no gallops noted   Abdomen: soft, non-tender. Bowel sounds normal. No masses,  no organomegaly   Extremities: extremities normal, atraumatic, no cyanosis or edema Skin: no rashes   Neuro: alert, oriented, normal speech, no focal findings or movement disorder noted         Assessment/Plan:       ICD-10-CM ICD-9-CM    1. Pleural effusion, right J90 511.9    2. Hypotension due to drugs.  EF by echo while hospitalized was 30 to 35%, follow up nuclear scan EF was 69% with no ischemia. Will DC ACEi , change lasix to every other day, and repeat echo in 3 weeks just prior to appointment anf if normal DC carvedilol and presume transient systolic dysfunction was related to underlying pneumonia I95.2 458.8      E980.5    3.  Pneumonia of right lung due to infectious organism, unspecified part of lung J18.9 483.8

## 2017-02-13 ENCOUNTER — TELEPHONE (OUTPATIENT)
Dept: CARDIOLOGY CLINIC | Age: 72
End: 2017-02-13

## 2017-02-13 DIAGNOSIS — I11.9 CARDIOMYOPATHY, HYPERTENSIVE, WITHOUT HEART FAILURE (HCC): Primary | ICD-10-CM

## 2017-02-13 DIAGNOSIS — I43 CARDIOMYOPATHY, HYPERTENSIVE, WITHOUT HEART FAILURE (HCC): Primary | ICD-10-CM

## 2017-02-13 RX ORDER — FUROSEMIDE 20 MG/1
TABLET ORAL
Qty: 45 TAB | Refills: 5 | Status: SHIPPED | OUTPATIENT
Start: 2017-02-13 | End: 2017-10-16 | Stop reason: ALTCHOICE

## 2017-02-13 NOTE — TELEPHONE ENCOUNTER
Verbal order and read back per Ormilagro Zheng MD    Patient needs to have repeat echo prior to visit next week. Order done. Called patient to her know.      Raman Ricks will call patient to schedule

## 2017-02-16 ENCOUNTER — HOSPITAL ENCOUNTER (OUTPATIENT)
Dept: GENERAL RADIOLOGY | Age: 72
Discharge: HOME | End: 2017-02-16
Payer: MEDICARE

## 2017-02-16 DIAGNOSIS — J18.9 UNRESOLVED PNEUMONIA: ICD-10-CM

## 2017-02-16 PROCEDURE — 71020 XR CHEST PA LAT: CPT

## 2017-02-22 ENCOUNTER — HOSPITAL ENCOUNTER (OUTPATIENT)
Dept: NON INVASIVE DIAGNOSTICS | Age: 72
Discharge: HOME | End: 2017-02-22
Attending: INTERNAL MEDICINE
Payer: MEDICARE

## 2017-02-22 DIAGNOSIS — I11.9 CARDIOMYOPATHY, HYPERTENSIVE, WITHOUT HEART FAILURE (HCC): ICD-10-CM

## 2017-02-22 DIAGNOSIS — I43 CARDIOMYOPATHY, HYPERTENSIVE, WITHOUT HEART FAILURE (HCC): ICD-10-CM

## 2017-02-22 PROCEDURE — 93306 TTE W/DOPPLER COMPLETE: CPT

## 2017-02-24 ENCOUNTER — OFFICE VISIT (OUTPATIENT)
Dept: CARDIOLOGY CLINIC | Age: 72
End: 2017-02-24

## 2017-02-24 VITALS
HEIGHT: 65 IN | WEIGHT: 117 LBS | BODY MASS INDEX: 19.49 KG/M2 | OXYGEN SATURATION: 94 % | HEART RATE: 96 BPM | SYSTOLIC BLOOD PRESSURE: 78 MMHG | DIASTOLIC BLOOD PRESSURE: 48 MMHG

## 2017-02-24 DIAGNOSIS — I95.2 HYPOTENSION DUE TO DRUGS: Primary | ICD-10-CM

## 2017-02-24 DIAGNOSIS — I42.9 CARDIOMYOPATHY (HCC): ICD-10-CM

## 2017-02-24 DIAGNOSIS — R93.89 ABNORMAL CT SCAN, CHEST: ICD-10-CM

## 2017-02-24 DIAGNOSIS — J90 PLEURAL EFFUSION: ICD-10-CM

## 2017-02-24 DIAGNOSIS — J18.9 PNEUMONIA OF RIGHT LUNG DUE TO INFECTIOUS ORGANISM, UNSPECIFIED PART OF LUNG: ICD-10-CM

## 2017-02-24 RX ORDER — IPRATROPIUM BROMIDE AND ALBUTEROL SULFATE 2.5; .5 MG/3ML; MG/3ML
3 SOLUTION RESPIRATORY (INHALATION)
COMMUNITY
End: 2017-02-24 | Stop reason: ALTCHOICE

## 2017-02-24 RX ORDER — UMECLIDINIUM 62.5 UG/1
AEROSOL, POWDER ORAL
Refills: 3 | COMMUNITY
Start: 2017-02-15 | End: 2018-11-07 | Stop reason: ALTCHOICE

## 2017-02-24 RX ORDER — LANOLIN ALCOHOL/MO/W.PET/CERES
1000 CREAM (GRAM) TOPICAL DAILY
COMMUNITY
End: 2022-01-01

## 2017-02-24 NOTE — MR AVS SNAPSHOT
Visit Information Date & Time Provider Department Dept. Phone Encounter #  
 2/24/2017 11:15 AM Priscilla Nick MD [address] [place] [address] Specialist at [place] [address] [id number] Follow-up Instructions Return in about 6 weeks (around 4/7/2017). Your Appointments 4/7/2017  1:00 PM  
Follow Up with Priscilla Nick MD  
Cardio Specialist at [facility]/[address]) Appt Note: 6 wk f/u  
 [address] [facility] [address] [facility] [address]  
  
   
 [address] [facility] Upcoming Health Maintenance Date Due Hepatitis C Screening 1945 DTaP/Tdap/Td series (1 - Tdap) 5/12/1966 BREAST CANCER SCRN MAMMOGRAM 5/12/1995 FOBT Q 1 YEAR AGE 50-75 5/12/1995 ZOSTER VACCINE AGE 60> 5/12/2005 GLAUCOMA SCREENING Q2Y 5/12/2010 OSTEOPOROSIS SCREENING (DEXA) 5/12/2010 Pneumococcal 65+ Low/Medium Risk (1 of 2 - PCV13) 5/12/2010 MEDICARE YEARLY EXAM 5/12/2010 Allergies as of 2/24/2017  Review Complete On: 2/5/2017 By: Priscilla Nick MD  
  
 Severity Noted Reaction Type Reactions Penicillins  01/27/2017    Unknown (comments) Per patient, 40 years ago, but has not taken since Current Immunizations  Never Reviewed Name Date Influenza Vaccine (Quad) PF 1/9/2017  9:33 PM  
  
 Not reviewed this visit Vitals BP  
  
  
  
  
  
 (!) 78/48 BMI and BSA Data Body Mass Index Body Surface Area  
 19.47 kg/m 2 1.56 m 2 Preferred Pharmacy Pharmacy Name Phone [facility] [phone] Your Updated Medication List  
  
   
This list is accurate as of: 2/24/17 11:40 AM.  Always use your most recent med list.  
  
  
  
  
 albuterol 90 mcg/actuation inhaler Commonly known as:  PROVENTIL HFA, VENTOLIN HFA, PROAIR HFA  
 Take 1 Puff by inhalation every four (4) hours as needed for Wheezing. aspirin 81 mg chewable tablet Take 1 Tab by mouth daily. cyanocobalamin 1,000 mcg tablet Take 1,000 mcg by mouth daily. furosemide 20 mg tablet Commonly known as:  LASIX TAKE 1 TABLET BY MOUTH EVERY OTHER DAY INCRUSE ELLIPTA 62.5 mcg/actuation inhaler Generic drug:  umeclidinium INL 1 PUFF PO D OXYGEN-AIR DELIVERY SYSTEMS  
by Does Not Apply route. 3 LPM O2 at home SYMBICORT 160-4.5 mcg/actuation HFA inhaler Generic drug:  budesonide-formoterol Take 2 Puffs by inhalation two (2) times a day. Follow-up Instructions Return in about 6 weeks (around 4/7/2017). Patient Instructions Stop lisinopril Stop Coreg Take lasix as needed Introducing [facility]! [facility] introduces [employer]) patient portal. Now you can access parts of your medical record, email your doctor's office, and request medication refills online. 1. In your internet browser, go to [email or web address] 2. Click on the First Time User? Click Here link in the Sign In box. You will see the New Member Sign Up page. 3. Enter your [employer]) Access Code exactly as it appears below. You will not need to use this code after youve completed the sign-up process. If you do not sign up before the expiration date, you must request a new code. · [employer]) Access Code: [email or web address] Expires: 4/5/2017  5:08 PM 
 
4. Enter the last four digits of your Social Security Number (xxxx) and Date of Birth (mm/dd/yyyy) as indicated and click Submit. You will be taken to the next sign-up page. 5. Create a [employer]) ID. This will be your [employer] login ID and cannot be changed, so think of one that is secure and easy to remember. 6. Create a [employer]) password. You can change your password at any time. 7. Enter your Password Reset Question and Answer.  This can be used at a later time if you forget your password. 8. Enter your e-mail address. You will receive e-mail notification when new information is available in [address] 9. Click Sign Up. You can now view and download portions of your medical record. 10. Click the Download Summary menu link to download a portable copy of your medical information. If you have questions, please visit the Frequently Asked Questions section of the [employer] website. Remember, [employer] is NOT to be used for urgent needs. For medical emergencies, dial 911. Now available from your iPhone and Android! Please provide this summary of care documentation to your next provider. Your primary care clinician is listed as CARISA Wilcox. If you have any questions after today's visit, please call [phone].

## 2017-02-24 NOTE — LETTER
3/23/2017 Patient:  Moo Hidalgo YOB: 1945 Date of Visit: 2/24/2017 Dear Jonathan Mcfarland MD 
[address] [place] [address],[address] [place] [id number] VIA Facsimile: [phone] 
 : Thank you for referring Ms. Hailee Mckeon to me for evaluation/treatment. Below are the relevant portions of my assessment and plan of care. Subjective:  
   Moo Hidalgo is in the office today for cardiac reevaluation. She is a 28-year-old woman that was recently hospitalized at [facility] for pneumonia. During the course of her evaluation, she had an echocardiogram, which demonstrated marked reduction of left ventricular systolic function. Her ejection fraction was 30-35%. She also had grade I diastolic dysfunction. She was started on a combination of low-dose betablockers and ACE inhibitor. The patient had no prior known history of cardiomyopathy. Prior to her discharge, she had a nuclear stress test to rule out an ischemic basis for her cardiomyopathy. She had no significant ongoing ischemia and her ejection fraction was 69%. That study was done on 01/16/2017. Her echocardiogram was recently repeated. Her ejection fraction now is 50-55%. She continues to have low blood pressures. She is not having any dizziness, near-syncope or syncope. She had a CT scan of the chest recently repeated. She will follow up with Prisca Donovan next week in that regard. Her main complaint today is that she continues to be excessively fatigued. Patient Active Problem List  
 Diagnosis Date Noted  Cardiomyopathy ([facility] [address] 02/26/2017  Abnormal CT scan, chest 02/26/2017  Pleural effusion 02/05/2017  Hypotension due to drugs 02/05/2017  Pneumonia 01/05/2017  Sepsis ([facility] [address] 01/05/2017 Current Outpatient Prescriptions Medication Sig Dispense Refill  INCRUSE ELLIPTA 62.5 mcg/actuation inhaler INL 1 PUFF PO D  3  
  cyanocobalamin 1,000 mcg tablet Take 1,000 mcg by mouth daily.  OXYGEN-AIR DELIVERY SYSTEMS by Does Not Apply route. 3 LPM O2 at home  furosemide (LASIX) 20 mg tablet TAKE 1 TABLET BY MOUTH EVERY OTHER DAY 45 Tab 5  
 aspirin 81 mg chewable tablet Take 1 Tab by mouth daily. 30 Tab 0  
 albuterol (PROVENTIL HFA, VENTOLIN HFA, PROAIR HFA) 90 mcg/actuation inhaler Take 1 Puff by inhalation every four (4) hours as needed for Wheezing. 1 Inhaler 0  
 budesonide-formoterol (SYMBICORT) 160-4.5 mcg/actuation HFA inhaler Take 2 Puffs by inhalation two (2) times a day. Allergies Allergen Reactions  Penicillins Unknown (comments) Per patient, 40 years ago, but has not taken since Past Medical History:  
Diagnosis Date  Chronic obstructive pulmonary disease ([facility] [address] Past Surgical History:  
Procedure Laterality Date [address] UNLISTED  HX GYN No family history on file. History Smoking Status  Current Every Day Smoker  Packs/day: 0.50 Smokeless Tobacco  
 Not on file Review of Systems, additional: 
Constitutional: negative Eyes: negative Respiratory: negative Cardiovascular: positive for fatigue Gastrointestinal: negative Musculoskeletal:negative Neurological: negative Behvioral/Psych: negative Endocrine: negative ENT: negative Objective:  
 
Visit Vitals  BP (!) 78/48  Pulse 96  Ht 5' 5\" (1.651 m)  Wt 53.1 kg (117 lb)  SpO2 94%  BMI 19.47 kg/m2 General:  alert, cooperative, no distress Chest Wall: inspection normal - no chest wall deformities or tenderness, respiratory effort normal  
Lung: clear to auscultation bilaterally Heart:  normal rate and regular rhythm, S1 and S2 normal, no murmurs noted, no gallops noted Abdomen: soft, non-tender. Bowel sounds normal. No masses,  no organomegaly Extremities: extremities normal, atraumatic, no cyanosis or edema Skin: no rashes Neuro: alert, oriented, normal speech, no focal findings or movement disorder noted Assessment/Plan: ICD-10-CM ICD-9-CM 1. Hypotension due to drugs I95.2 458.8 E980.5 2. Pleural effusion J90 511.9 3. Pneumonia of right lung due to infectious organism, unspecified part of lung J18.9 483.8 4. Cardiomyopathy ([address] EF improved to 50-55%. Will DC carvedilol and lisinopril. F/U in 6 weeks I42.9 425.4 5. Abnormal CT scan, chest, sees Dr Gagan Quinones next week R93.8 793.2 If you have questions, please do not hesitate to call me. I look forward to following Ms. Mckeon along with you. Sincerely, Piedad Wade MD

## 2017-02-24 NOTE — PROGRESS NOTES
1. Have you been to the ER, urgent care clinic since your last visit? Hospitalized since your last visit? No    2. Have you seen or consulted any other health care providers outside of the [address] [place] [address] since your last visit? Include any pap smears or colon screening.  No

## 2017-02-26 PROBLEM — I42.9 CARDIOMYOPATHY (HCC): Status: ACTIVE | Noted: 2017-02-26

## 2017-02-26 PROBLEM — R93.89 ABNORMAL CT SCAN, CHEST: Status: ACTIVE | Noted: 2017-02-26

## 2017-02-26 NOTE — PROGRESS NOTES
Subjective:      David Diego is in the office today for cardiac reevaluation. She is a 58-year-old woman that was recently hospitalized at [facility] for pneumonia. During the course of her evaluation, she had an echocardiogram, which demonstrated marked reduction of left ventricular systolic function. Her ejection fraction was 30-35%. She also had grade I diastolic dysfunction. She was started on a combination of low-dose betablockers and ACE inhibitor. The patient had no prior known history of cardiomyopathy. Prior to her discharge, she had a nuclear stress test to rule out an ischemic basis for her cardiomyopathy. She had no significant ongoing ischemia and her ejection fraction was 69%. That study was done on 01/16/2017. Her echocardiogram was recently repeated. Her ejection fraction now is 50-55%. She continues to have low blood pressures. She is not having any dizziness, near-syncope or syncope. She had a CT scan of the chest recently repeated. She will follow up with Shamir Pittman next week in that regard. Her main complaint today is that she continues to be excessively fatigued. Patient Active Problem List    Diagnosis Date Noted    Cardiomyopathy [facility]) 02/26/2017    Abnormal CT scan, chest 02/26/2017    Pleural effusion 02/05/2017    Hypotension due to drugs 02/05/2017    Pneumonia 01/05/2017    Sepsis ([address] 01/05/2017     Current Outpatient Prescriptions   Medication Sig Dispense Refill    INCRUSE ELLIPTA 62.5 mcg/actuation inhaler INL 1 PUFF PO D  3    cyanocobalamin 1,000 mcg tablet Take 1,000 mcg by mouth daily.  OXYGEN-AIR DELIVERY SYSTEMS by Does Not Apply route. 3 LPM O2 at home      furosemide (LASIX) 20 mg tablet TAKE 1 TABLET BY MOUTH EVERY OTHER DAY 45 Tab 5    aspirin 81 mg chewable tablet Take 1 Tab by mouth daily.  30 Tab 0    albuterol (PROVENTIL HFA, VENTOLIN HFA, PROAIR HFA) 90 mcg/actuation inhaler Take 1 Puff by inhalation every four (4) hours as needed for Wheezing. 1 Inhaler 0    budesonide-formoterol (SYMBICORT) 160-4.5 mcg/actuation HFA inhaler Take 2 Puffs by inhalation two (2) times a day. Allergies   Allergen Reactions    Penicillins Unknown (comments)     Per patient, 40 years ago, but has not taken since     Past Medical History:   Diagnosis Date    Chronic obstructive pulmonary disease ([facility] [address]      Past Surgical History:   Procedure Laterality Date    ABDOMEN SURGERY PROC UNLISTED      HX GYN       No family history on file. History   Smoking Status    Current Every Day Smoker    Packs/day: 0.50   Smokeless Tobacco    Not on file          Review of Systems, additional:  Constitutional: negative  Eyes: negative  Respiratory: negative  Cardiovascular: positive for fatigue  Gastrointestinal: negative  Musculoskeletal:negative  Neurological: negative  Behvioral/Psych: negative  Endocrine: negative  ENT: negative    Objective:     Visit Vitals    BP (!) 78/48    Pulse 96    Ht 5' 5\" (1.651 m)    Wt 53.1 kg (117 lb)    SpO2 94%    BMI 19.47 kg/m2     General:  alert, cooperative, no distress   Chest Wall: inspection normal - no chest wall deformities or tenderness, respiratory effort normal   Lung: clear to auscultation bilaterally   Heart:  normal rate and regular rhythm, S1 and S2 normal, no murmurs noted, no gallops noted   Abdomen: soft, non-tender. Bowel sounds normal. No masses,  no organomegaly   Extremities: extremities normal, atraumatic, no cyanosis or edema Skin: no rashes   Neuro: alert, oriented, normal speech, no focal findings or movement disorder noted       Assessment/Plan:       ICD-10-CM ICD-9-CM    1. Hypotension due to drugs I95.2 458.8      E980.5    2. Pleural effusion J90 511.9    3. Pneumonia of right lung due to infectious organism, unspecified part of lung J18.9 483.8    4. [facility] EF improved to 50-55%. Will DC carvedilol and lisinopril.  F/U in 6 weeks I42.9 425.4    5.  Abnormal CT scan, chest, sees Dr Fabien Palomares next week R93.8 793.2

## 2017-03-17 NOTE — COMMUNICATION BODY
Subjective:      Zack Chen is in the office today for cardiac reevaluation. She is a 72-year-old woman that was recently hospitalized at [facility] for pneumonia. During the course of her evaluation, she had an echocardiogram, which demonstrated marked deficit of left ventricular systolic function. Her ejection fraction was 30-35%. She also had grade I diastolic dysfunction. She had no prior history of known cardiomyopathy. Prior to her discharge, a nuclear stress test was done to rule out an ischemic basis for her cardiomyopathy. She had no significant ongoing ischemia and her ejection fraction was 69%. That particular study was done on 01/16/2017. She was discharged on low-dose betablocker in the form of carvedilol, low-dose ACE inhibitor in the form of Lisinopril and Lasix 40 mg per day. Recently, the PA at Dr. Lexy Jeong office reduced her Lasix from 40 mg to 20 mg. In the office today, her blood pressure is 90 systolic. She is not having any dizziness or lightheadedness. She has some shortness of breath at rest and with activity. She has had no PND. She has been sleeping a lot during the day. She returned to the emergency department on 01/26 after she had a chest x-ray that showed possible worsening of her pneumonia. She was to restarted on Levaquin. She is in the office today for followup in that regard. Patient Active Problem List    Diagnosis Date Noted    Pleural effusion 02/05/2017    Hypotension due to drugs 02/05/2017    Pneumonia 01/05/2017    Sepsis ([facility] [address] 01/05/2017     Current Outpatient Prescriptions   Medication Sig Dispense Refill    furosemide (LASIX) 20 mg tablet Take 1 Tab by mouth every other day. 30 Tab 5    aspirin 81 mg chewable tablet Take 1 Tab by mouth daily. 30 Tab 0    carvedilol (COREG) 3.125 mg tablet Take 1 Tab by mouth two (2) times daily (with meals).  60 Tab 0    lisinopril (Doan Loco) 2.5 mg tablet Take 1 Tab by mouth daily. 30 Tab 0    albuterol (PROVENTIL HFA, VENTOLIN HFA, PROAIR HFA) 90 mcg/actuation inhaler Take 1 Puff by inhalation every four (4) hours as needed for Wheezing. 1 Inhaler 0    budesonide-formoterol (SYMBICORT) 160-4.5 mcg/actuation HFA inhaler Take 2 Puffs by inhalation two (2) times a day. Allergies   Allergen Reactions    Penicillins Unknown (comments)     Per patient, 40 years ago, but has not taken since     Past Medical History   Diagnosis Date    Chronic obstructive pulmonary disease ([facility] [address]      Past Surgical History   Procedure Laterality Date    Hx gyn      Pr abdomen surgery proc unlisted       No family history on file. History   Smoking Status    Current Every Day Smoker    Packs/day: 0.50   Smokeless Tobacco    Not on file          Review of Systems, additional:  Constitutional: negative  Eyes: negative  Respiratory: positive for dyspnea on exertion  Cardiovascular: negative  Gastrointestinal: negative  Musculoskeletal:negative  Neurological: negative  Behvioral/Psych: negative  Endocrine: negative  ENT: negative    Objective:     Visit Vitals    BP (!) 87/63    Pulse (!) 109    Ht 5' 5\" (1.651 m)    Wt 52.2 kg (115 lb)    SpO2 92%    BMI 19.14 kg/m2     General:  alert, cooperative, no distress   Chest Wall: inspection normal - no chest wall deformities or tenderness, respiratory effort normal   Lung: diminished breath sounds R base   Heart:  normal rate and regular rhythm, S1 and S2 normal, no gallops noted   Abdomen: soft, non-tender. Bowel sounds normal. No masses,  no organomegaly   Extremities: extremities normal, atraumatic, no cyanosis or edema Skin: no rashes   Neuro: alert, oriented, normal speech, no focal findings or movement disorder noted         Assessment/Plan:       ICD-10-CM ICD-9-CM    1. Pleural effusion, right J90 511.9    2. Hypotension due to drugs.  EF by echo while hospitalized was 30 to 35%, follow up nuclear scan EF was 69% with no ischemia. Will DC ACEi , change lasix to every other day, and repeat echo in 3 weeks just prior to appointment anf if normal DC carvedilol and presume transient systolic dysfunction was related to underlying pneumonia I95.2 458.8      E980.5    3.  Pneumonia of right lung due to infectious organism, unspecified part of lung J18.9 483.8

## 2017-04-07 ENCOUNTER — OFFICE VISIT (OUTPATIENT)
Dept: CARDIOLOGY CLINIC | Age: 72
End: 2017-04-07

## 2017-04-07 VITALS
HEART RATE: 94 BPM | OXYGEN SATURATION: 90 % | HEIGHT: 65 IN | BODY MASS INDEX: 19.49 KG/M2 | SYSTOLIC BLOOD PRESSURE: 102 MMHG | DIASTOLIC BLOOD PRESSURE: 61 MMHG | WEIGHT: 117 LBS

## 2017-04-07 DIAGNOSIS — R93.89 ABNORMAL CT SCAN, CHEST: ICD-10-CM

## 2017-04-07 DIAGNOSIS — I95.2 HYPOTENSION DUE TO DRUGS: Primary | ICD-10-CM

## 2017-04-07 DIAGNOSIS — I42.9 CARDIOMYOPATHY, UNSPECIFIED TYPE (HCC): ICD-10-CM

## 2017-04-07 DIAGNOSIS — Z86.19 HX OF SEPSIS: ICD-10-CM

## 2017-04-07 NOTE — PROGRESS NOTES
1. Have you been to the ER, urgent care clinic since your last visit? Hospitalized since your last visit? No    2. Have you seen or consulted any other health care providers outside of the [facility] since your last visit? Include any pap smears or colon screening.  No

## 2017-04-07 NOTE — MR AVS SNAPSHOT
Visit Information Date & Time Provider Department Dept. Phone Encounter #  
 4/7/2017  1:00 PM Oniel Valdez MD [address] [place] [address] Specialist at [place] [phone] [id number] Follow-up Instructions Return in about 6 months (around 10/7/2017). Your Appointments 10/9/2017  1:15 PM  
Follow Up with Oniel Valdez MD  
Cardio Specialist at [facility]/[address]) Appt Note: 6 m f/u  
 [facility] [address] Upcoming Health Maintenance Date Due Hepatitis C Screening 1945 DTaP/Tdap/Td series (1 - Tdap) 5/12/1966 BREAST CANCER SCRN MAMMOGRAM 5/12/1995 FOBT Q 1 YEAR AGE 50-75 5/12/1995 ZOSTER VACCINE AGE 60> 5/12/2005 GLAUCOMA SCREENING Q2Y 5/12/2010 OSTEOPOROSIS SCREENING (DEXA) 5/12/2010 Pneumococcal 65+ Low/Medium Risk (1 of 2 - PCV13) 5/12/2010 MEDICARE YEARLY EXAM 5/12/2010 Allergies as of 4/7/2017  Review Complete On: 4/7/2017 By: Uche Thompson, RN Severity Noted Reaction Type Reactions Penicillins  01/27/2017    Unknown (comments) Per patient, 40 years ago, but has not taken since Current Immunizations  Never Reviewed Name Date Influenza Vaccine (Quad) PF 1/9/2017  9:33 PM  
  
 Not reviewed this visit Vitals BP Pulse Height(growth percentile) Weight(growth percentile) SpO2 BMI  
 102/61 94 5' 5\" (1.651 m) 117 lb (53.1 kg) 90% 19.47 kg/m2 OB Status Smoking Status Hysterectomy Current Every Day Smoker BMI and BSA Data Body Mass Index Body Surface Area  
 19.47 kg/m 2 1.56 m 2 Preferred Pharmacy Pharmacy Name Phone [facility] [address] [place] [address] [address] [phone] Your Updated Medication List  
  
   
 This list is accurate as of: 4/7/17  1:32 PM.  Always use your most recent med list.  
  
  
  
  
 albuterol 90 mcg/actuation inhaler Commonly known as:  PROVENTIL HFA, VENTOLIN HFA, PROAIR HFA Take 1 Puff by inhalation every four (4) hours as needed for Wheezing. aspirin 81 mg chewable tablet Take 1 Tab by mouth daily. cyanocobalamin 1,000 mcg tablet Take 1,000 mcg by mouth daily. furosemide 20 mg tablet Commonly known as:  LASIX TAKE 1 TABLET BY MOUTH EVERY OTHER DAY INCRUSE ELLIPTA 62.5 mcg/actuation inhaler Generic drug:  umeclidinium INL 1 PUFF PO D OXYGEN-AIR DELIVERY SYSTEMS  
by Does Not Apply route. 3 LPM O2 at home SYMBICORT 160-4.5 mcg/actuation HFA inhaler Generic drug:  budesonide-formoterol Take 2 Puffs by inhalation two (2) times a day. Follow-up Instructions Return in about 6 months (around 10/7/2017). Introducing [facility]! Jad Jackson introduces [employer] patient portal. Now you can access parts of your medical record, email your doctor's office, and request medication refills online. 1. In your internet browser, go to [email or web address] 2. Click on the First Time User? Click Here link in the Sign In box. You will see the New Member Sign Up page. 3. Enter your [employer] Access Code exactly as it appears below. You will not need to use this code after youve completed the sign-up process. If you do not sign up before the expiration date, you must request a new code. · [employer] Access Code: [email or web address] Expires: 7/6/2017  1:32 PM 
 
4. Enter the last four digits of your Social Security Number (xxxx) and Date of Birth (mm/dd/yyyy) as indicated and click Submit. You will be taken to the next sign-up page. 5. Create a [employer] ID. This will be your [employer] login ID and cannot be changed, so think of one that is secure and easy to remember. 6. Create a [employer] password. You can change your password at any time. 7. Enter your Password Reset Question and Answer. This can be used at a later time if you forget your password. 8. Enter your e-mail address. You will receive e-mail notification when new information is available in [address] 9. Click Sign Up. You can now view and download portions of your medical record. 10. Click the Download Summary menu link to download a portable copy of your medical information. If you have questions, please visit the Frequently Asked Questions section of the [employer] website. Remember, [employer] is NOT to be used for urgent needs. For medical emergencies, dial 911. Now available from your iPhone and Android! Please provide this summary of care documentation to your next provider. Your primary care clinician is listed as CARISA Castillo. If you have any questions after today's visit, please call [phone].

## 2017-04-12 PROBLEM — A41.9 SEPSIS (HCC): Status: RESOLVED | Noted: 2017-01-05 | Resolved: 2017-04-12

## 2017-04-12 PROBLEM — Z86.19 HX OF SEPSIS: Status: ACTIVE | Noted: 2017-04-12

## 2017-04-13 NOTE — PROGRESS NOTES
Subjective:      Vijay Shin is in the office today for cardiac reevaluation. She is a 27-year-old woman that was hospitalized at [facility] in October of 2015. At that time, she was having pneumonia and sepsis syndrome. During the course of her evaluation, she had an echocardiogram, which demonstrated marked reduction of left ventricular systolic function. Her ejection fraction was 30-35%. She was started on a combination of low-dose betablockers and ACE inhibitors. A nuclear stress test was done prior to her discharge, which showed no evidence for ischemia. Her ejection fraction was 69% on the nuclear stress test, which was done on 01/16/2017. Her echocardiogram was repeated in February. Her EF had returned to near normal at 50-55%. She continued to have low blood pressures while on the betablocker and ACE inhibitor, which were discontinued. She did have an abnormal CT scan, which has been followed up by Dr. Erna Becerra. In the office today, she tells me that evaluation is complete and there was no significant pathology. In the office today, she says that her breathing is okay. She has had no chest pain. She has had no PND or orthopnea. She has had no palpitations, near syncope or syncope. Patient Active Problem List    Diagnosis Date Noted    Hx of sepsis 04/12/2017    Cardiomyopathy ([facility] [address] 02/26/2017    Abnormal CT scan, chest 02/26/2017    Pleural effusion 02/05/2017    Hypotension due to drugs 02/05/2017    Pneumonia 01/05/2017     Current Outpatient Prescriptions   Medication Sig Dispense Refill    INCRUSE ELLIPTA 62.5 mcg/actuation inhaler INL 1 PUFF PO D  3    cyanocobalamin 1,000 mcg tablet Take 1,000 mcg by mouth daily.  OXYGEN-AIR DELIVERY SYSTEMS by Does Not Apply route.  3 LPM O2 at home      furosemide (LASIX) 20 mg tablet TAKE 1 TABLET BY MOUTH EVERY OTHER DAY 45 Tab 5    aspirin 81 mg chewable tablet Take 1 Tab by mouth daily. 30 Tab 0    albuterol (PROVENTIL HFA, VENTOLIN HFA, PROAIR HFA) 90 mcg/actuation inhaler Take 1 Puff by inhalation every four (4) hours as needed for Wheezing. 1 Inhaler 0    budesonide-formoterol (SYMBICORT) 160-4.5 mcg/actuation HFA inhaler Take 2 Puffs by inhalation two (2) times a day. Allergies   Allergen Reactions    Penicillins Unknown (comments)     Per patient, 40 years ago, but has not taken since     Past Medical History:   Diagnosis Date    Chronic obstructive pulmonary disease ([facility] [address]      Past Surgical History:   Procedure Laterality Date    ABDOMEN SURGERY PROC UNLISTED      HX GYN       No family history on file. History   Smoking Status    Current Every Day Smoker    Packs/day: 0.50   Smokeless Tobacco    Not on file          Review of Systems, additional:  Constitutional: negative  Eyes: negative  Respiratory: negative  Cardiovascular: negative  Gastrointestinal: negative  Musculoskeletal:negative  Neurological: negative  Behvioral/Psych: negative  Endocrine: negative  ENT: negative    Objective:     Visit Vitals    /61    Pulse 94    Ht 5' 5\" (1.651 m)    Wt 117 lb (53.1 kg)    SpO2 90%    BMI 19.47 kg/m2     General:  alert, cooperative, no distress   Chest Wall: inspection normal - no chest wall deformities or tenderness, respiratory effort normal   Lung: clear to auscultation bilaterally   Heart:  normal rate and regular rhythm, S1 and S2 normal, no murmurs noted, no gallops noted   Abdomen: soft, non-tender. Bowel sounds normal. No masses,  no organomegaly   Extremities: extremities normal, atraumatic, no cyanosis or edema Skin: no rashes   Neuro: alert, oriented, normal speech, no focal findings or movement disorder noted         Assessment/Plan:       ICD-10-CM ICD-9-CM    1. Hypotension due to drugs, resolved I95.2 458.8      E980.5    2. Cardiomyopathy, unspecified type, Systolic function near normal 2/22/2017. Stable. RT 6 mos I42.9 425.4    3.  Abnormal CT scan, chest, followed by Dr Erna Becerra R93.8 793.2    4.  Hx of sepsis Z86.19 V12.09

## 2017-10-16 ENCOUNTER — OFFICE VISIT (OUTPATIENT)
Dept: CARDIOLOGY CLINIC | Age: 72
End: 2017-10-16

## 2017-10-16 VITALS
HEART RATE: 80 BPM | SYSTOLIC BLOOD PRESSURE: 141 MMHG | DIASTOLIC BLOOD PRESSURE: 75 MMHG | OXYGEN SATURATION: 92 % | BODY MASS INDEX: 21.66 KG/M2 | WEIGHT: 130 LBS | HEIGHT: 65 IN

## 2017-10-16 DIAGNOSIS — Z86.19 HX OF SEPSIS: ICD-10-CM

## 2017-10-16 DIAGNOSIS — I95.2 HYPOTENSION DUE TO DRUGS: ICD-10-CM

## 2017-10-16 DIAGNOSIS — I42.9 CARDIOMYOPATHY, UNSPECIFIED TYPE (HCC): Primary | ICD-10-CM

## 2017-10-16 DIAGNOSIS — R93.89 ABNORMAL CT SCAN, CHEST: ICD-10-CM

## 2017-10-16 RX ORDER — FUROSEMIDE 20 MG/1
20 TABLET ORAL EVERY OTHER DAY
COMMUNITY
End: 2018-11-07 | Stop reason: ALTCHOICE

## 2017-10-16 RX ORDER — CHOLECALCIFEROL TAB 125 MCG (5000 UNIT) 125 MCG
TAB ORAL DAILY
COMMUNITY

## 2017-10-16 NOTE — PROGRESS NOTES
1. Have you been to the ER, urgent care clinic since your last visit? Hospitalized since your last visit? No    2. Have you seen or consulted any other health care providers outside of the [address] [place] [address] since your last visit? Include any pap smears or colon screening.  No

## 2017-10-22 NOTE — PROGRESS NOTES
Subjective:      Bin Anderson is in the office today for cardiac reevaluation. She is a 70-year-old woman that was hospitalized at [facility]hospitals in October of 2015. At that time, she had pneumonia and sepsis syndrome. During the course of her evaluation, she had an echocardiogram, which demonstrated marked reduction of left ventricular systolic function. Her ejection fraction was 30-35%. She was started on a combination of low-dose betablockers and ACE inhibitors. A nuclear stress test was done prior to her discharge, which showed no evidence for ischemia. Her ejection fraction was 69% on the nuclear stress test, which was done on 01/16/2017. Her echocardiogram was repeated in February of 2017. Her EF had returned to near normal at 50-55%. She continued to have low blood pressures while on the betablocker and ACE inhibitor, and the meds were discontinued. She did have an abnormal CT scan, which has been followed up by Dr. Beatrice Connor. In the office today, she tells me that evaluation is complete and there was no significant pathology. In the office today, she says that her breathing is okay. She has had no chest pain. She has had no PND or orthopnea. She has had no palpitations, near syncope or syncope. Patient Active Problem List    Diagnosis Date Noted    Normal cardiac stress test 10/22/2017    Hx of sepsis 04/12/2017    Cardiomyopathy ([facility] [address] 02/26/2017    Abnormal CT scan, chest 02/26/2017    Pleural effusion 02/05/2017    Hypotension due to drugs 02/05/2017    Pneumonia 01/05/2017     Current Outpatient Prescriptions   Medication Sig Dispense Refill    THIAMINE HCL, VITAMIN B1, PO Take  by Mouth.  cholecalciferol, VITAMIN D3, (VITAMIN D3) 5,000 unit tab tablet Take  by mouth daily.  furosemide (LASIX) 20 mg tablet Take 20 mg by mouth every other day.  AS NEEDED ONLY      INCRUSE ELLIPTA 62.5 mcg/actuation inhaler INL 1 PUFF PO D  3  cyanocobalamin 1,000 mcg tablet Take 1,000 mcg by mouth daily.  OXYGEN-AIR DELIVERY SYSTEMS by Does Not Apply route. 3 LPM O2 at home      aspirin 81 mg chewable tablet Take 1 Tab by mouth daily. 30 Tab 0    albuterol (PROVENTIL HFA, VENTOLIN HFA, PROAIR HFA) 90 mcg/actuation inhaler Take 1 Puff by inhalation every four (4) hours as needed for Wheezing. 1 Inhaler 0    budesonide-formoterol (SYMBICORT) 160-4.5 mcg/actuation HFA inhaler Take 2 Puffs by inhalation two (2) times a day. Allergies   Allergen Reactions    Penicillins Unknown (comments)     Per patient, 40 years ago, but has not taken since     Past Medical History:   Diagnosis Date    Chronic obstructive pulmonary disease ([facility] [address]      Past Surgical History:   Procedure Laterality Date    ABDOMEN SURGERY PROC UNLISTED      HX GYN       No family history on file. History   Smoking Status    Current Every Day Smoker    Packs/day: 0.50   Smokeless Tobacco    Never Used          Review of Systems, additional:  Constitutional: negative  Eyes: negative  Respiratory: negative  Cardiovascular: negative  Gastrointestinal: negative  Musculoskeletal:negative  Neurological: negative  Behvioral/Psych: negative  Endocrine: negative  ENT: negative    Objective:     Visit Vitals    /75    Pulse 80    Ht 5' 5\" (1.651 m)    Wt 130 lb (59 kg)    SpO2 92%    BMI 21.63 kg/m2     General:  alert, cooperative, no distress   Chest Wall: inspection normal - no chest wall deformities or tenderness, respiratory effort normal   Lung: clear to auscultation bilaterally   Heart:  normal rate and regular rhythm, S1 and S2 normal, no murmurs noted, no gallops noted   Abdomen: soft, non-tender.  Bowel sounds normal. No masses,  no organomegaly   Extremities: extremities normal, atraumatic, no cyanosis or edema Skin: no rashes   Neuro: alert, oriented, normal speech, no focal findings or movement disorder noted         Assessment/Plan:       ICD-10-CM ICD-9-CM    1. Hypotension due to drugs, resolved I95.2 458.8      E980.5    2. Cardiomyopathy, unspecified type, Systolic function near normal 2/22/2017. Stable. RT 1 year I42.9 425.4    3. Abnormal CT scan, chest, followed by Dr Lissa Orozco R93.8 793.2    4.  Hx of sepsis Z86.19 V12.09    5       Normal nuclear stress test 01/16/2017

## 2018-11-07 ENCOUNTER — OFFICE VISIT (OUTPATIENT)
Dept: CARDIOLOGY CLINIC | Age: 73
End: 2018-11-07

## 2018-11-07 VITALS
BODY MASS INDEX: 20.3 KG/M2 | OXYGEN SATURATION: 91 % | HEART RATE: 95 BPM | WEIGHT: 122 LBS | DIASTOLIC BLOOD PRESSURE: 71 MMHG | SYSTOLIC BLOOD PRESSURE: 125 MMHG

## 2018-11-07 DIAGNOSIS — R93.89 ABNORMAL CT SCAN, CHEST: ICD-10-CM

## 2018-11-07 DIAGNOSIS — I42.9 CARDIOMYOPATHY, UNSPECIFIED TYPE (HCC): Primary | ICD-10-CM

## 2018-11-07 DIAGNOSIS — R54 FRAILTY: ICD-10-CM

## 2018-11-07 DIAGNOSIS — I95.2 HYPOTENSION DUE TO DRUGS: ICD-10-CM

## 2018-11-07 DIAGNOSIS — Z86.19 HX OF SEPSIS: ICD-10-CM

## 2018-11-07 DIAGNOSIS — Z92.89 HISTORY OF STRESS TEST: ICD-10-CM

## 2018-11-07 NOTE — PROGRESS NOTES
1. Have you been to the ER, urgent care clinic since your last visit? Hospitalized since your last visit? No   2. Have you seen or consulted any other health care providers outside of the [address] [place] [address] since your last visit? Include any pap smears or colon screening.  No

## 2018-11-18 PROBLEM — Z92.89 HISTORY OF STRESS TEST: Status: ACTIVE | Noted: 2017-10-22

## 2018-11-18 PROBLEM — R54 FRAILTY: Status: ACTIVE | Noted: 2018-11-18

## 2018-11-18 NOTE — PROGRESS NOTES
Subjective:      Jemima Wong is in the office today for cardiac reevaluation. She is a 55-year-old woman that was hospitalized at [facility] [place] [facility] of [facility]. At that time, she had pneumonia and sepsis syndrome. During the course of her evaluation, she had an echocardiogram, which demonstrated marked reduction of left ventricular systolic function. Her ejection fraction was 30-35%. She was started on a combination of low-dose betablockers and ACE inhibitors. A nuclear stress test was done prior to her discharge, which showed no evidence for ischemia. Her ejection fraction was 69% on the nuclear stress test, which was done on 01/16/2017. Her echocardiogram was repeated in February of 2017. Her EF had returned to near normal at 50-55%. She continued to have low blood pressures while on the betablocker and ACE inhibitor, and the meds were discontinued. She did have an abnormal CT scan, which has been followed up by Dr. Breanna Berman. In the office today, she tells me that evaluation is complete and there was no significant pathology. In the office today, she reports that her breathing has been okay. She rates it as a 7 on a 1 to 10 scale. She sometimes has short breath with walking around the house. She does believe she can walk up a flight of steps without limiting dyspnea. She has had no PND or orthopnea. Patient Active Problem List    Diagnosis Date Noted    Frailty 11/18/2018    History of stress test 10/22/2017    Hx of sepsis 04/12/2017    Cardiomyopathy ([facility] [address] 02/26/2017    Abnormal CT scan, chest 02/26/2017    Pleural effusion 02/05/2017    Hypotension due to drugs 02/05/2017    Pneumonia 01/05/2017     Current Outpatient Medications   Medication Sig Dispense Refill    THIAMINE HCL, VITAMIN B1, PO Take  by Mouth.  cholecalciferol, VITAMIN D3, (VITAMIN D3) 5,000 unit tab tablet Take  by mouth daily.       cyanocobalamin 1,000 mcg tablet Take 1,000 mcg by mouth daily.  OXYGEN-AIR DELIVERY SYSTEMS by Does Not Apply route. 3 LPM O2 at home      aspirin 81 mg chewable tablet Take 1 Tab by mouth daily. 30 Tab 0    albuterol (PROVENTIL HFA, VENTOLIN HFA, PROAIR HFA) 90 mcg/actuation inhaler Take 1 Puff by inhalation every four (4) hours as needed for Wheezing. 1 Inhaler 0    budesonide-formoterol (SYMBICORT) 160-4.5 mcg/actuation HFA inhaler Take 2 Puffs by inhalation two (2) times a day. Allergies   Allergen Reactions    Penicillins Unknown (comments)     Per patient, 40 years ago, but has not taken since     Past Medical History:   Diagnosis Date    Chronic obstructive pulmonary disease ([facility] [address]      Past Surgical History:   Procedure Laterality Date    ABDOMEN SURGERY PROC UNLISTED      HX GYN       No family history on file. Social History     Tobacco Use   Smoking Status Current Every Day Smoker    Packs/day: 0.50   Smokeless Tobacco Never Used          Review of Systems, additional:  Constitutional: negative  Eyes: negative  Respiratory: negative  Cardiovascular: negative  Gastrointestinal: negative  Musculoskeletal:negative  Neurological: negative  Behvioral/Psych: negative  Endocrine: negative  ENT: negative    Objective:     Visit Vitals  /71   Pulse 95   Wt 122 lb (55.3 kg)   SpO2 91%   BMI 20.30 kg/m²     General:  alert, cooperative, no distress   Chest Wall: inspection normal - no chest wall deformities or tenderness, respiratory effort normal   Lung: clear to auscultation bilaterally   Heart:  normal rate and regular rhythm, S1 and S2 normal, no murmurs noted, no gallops noted   Abdomen: soft, non-tender. Bowel sounds normal. No masses,  no organomegaly   Extremities: extremities normal, atraumatic, no cyanosis or edema Skin: no rashes   Neuro: alert, oriented, normal speech, no focal findings or movement disorder noted         Assessment/Plan:       ICD-10-CM ICD-9-CM    1.  Hypotension due to drugs, resolved I95.2 458.8      E980.5    2. Cardiomyopathy, unspecified type, Systolic function near normal 2/22/2017. Stable. RT 2 months. Consider repeat echo at that time I42.9 425.4    3. Abnormal CT scan, chest, followed by Dr Tevin Champagne R93.8 793.2    4.  Hx of sepsis Z86.19 V12.09    5       Normal nuclear stress test 01/16/2017

## 2019-01-16 ENCOUNTER — OFFICE VISIT (OUTPATIENT)
Dept: CARDIOLOGY CLINIC | Age: 74
End: 2019-01-16

## 2019-01-16 VITALS
OXYGEN SATURATION: 91 % | HEART RATE: 99 BPM | WEIGHT: 116 LBS | BODY MASS INDEX: 19.33 KG/M2 | HEIGHT: 65 IN | SYSTOLIC BLOOD PRESSURE: 105 MMHG | DIASTOLIC BLOOD PRESSURE: 72 MMHG

## 2019-01-16 DIAGNOSIS — I42.9 CARDIOMYOPATHY, UNSPECIFIED TYPE (HCC): ICD-10-CM

## 2019-01-16 DIAGNOSIS — Z91.81 AT RISK FOR FALLS: ICD-10-CM

## 2019-01-16 DIAGNOSIS — R06.09 DOE (DYSPNEA ON EXERTION): ICD-10-CM

## 2019-01-16 DIAGNOSIS — Z86.19 HX OF SEPSIS: ICD-10-CM

## 2019-01-16 DIAGNOSIS — R93.89 ABNORMAL CT SCAN, CHEST: ICD-10-CM

## 2019-01-16 DIAGNOSIS — R54 FRAILTY: ICD-10-CM

## 2019-01-16 DIAGNOSIS — Z92.89 HISTORY OF STRESS TEST: ICD-10-CM

## 2019-01-16 DIAGNOSIS — R29.6 FALLS FREQUENTLY: Primary | ICD-10-CM

## 2019-01-16 DIAGNOSIS — R53.1 WEAKNESS: ICD-10-CM

## 2019-01-16 NOTE — PROGRESS NOTES
1. Have you been to the ER, urgent care clinic since your last visit? Hospitalized since your last visit? No 
 
2. Have you seen or consulted any other health care providers outside of the [facility] since your last visit? Include any pap smears or colon screening.  No

## 2019-01-16 NOTE — PATIENT INSTRUCTIONS
Debbie Edmonds will call to schedule your testing within 24-48 hours. If you do not hear from her, then please call her directly at [phone]. All testing/lab work is completed in [address] [place] [address] [address]  
at [facility] [address] Referral to [facility] [place]

## 2019-01-29 PROBLEM — Z91.81 AT RISK FOR FALLS: Status: ACTIVE | Noted: 2019-01-29

## 2019-01-29 PROBLEM — R06.09 DOE (DYSPNEA ON EXERTION): Status: ACTIVE | Noted: 2019-01-29

## 2019-01-29 NOTE — PROGRESS NOTES
Subjective:  
   Virgil Orozco is in the office today for cardiac reevaluation. She is a 70-year-old woman that was hospitalized at [facility] in October of 2015. At that time, she had pneumonia and sepsis syndrome. During the course of her evaluation, she had an echocardiogram, which demonstrated marked reduction of left ventricular systolic function. Her ejection fraction was 30-35%. She was started on a combination of low-dose betablockers and ACE inhibitors. A nuclear stress test was done prior to her discharge which showed no evidence for ischemia. Her ejection fraction was 69% on the nuclear stress test that was done on 01/16/2017. Her echocardiogram was repeated in February of 2017. Her EF had returned to near normal at 50-55%. She continued to have low blood pressures while on the betablocker and ACE inhibitor, and the meds were discontinued. She did have an abnormal CT scan, which has been followed up by Dr. Cindi Joshua. In the office today, she tells me that evaluation is complete and there was no significant pathology. In the office today, she reports that she is been doing reasonably well but still gets short of breath with minimal activity. She recently had a fall at the grocery store. She did not have loss of consciousness. It appeared to be more consistent with a mechanical fall. Patient Active Problem List  
 Diagnosis Date Noted  PULLIAM (dyspnea on exertion) 01/29/2019  At risk for falls 01/29/2019  Frailty 11/18/2018  History of stress test 10/22/2017  Hx of sepsis 04/12/2017  Cardiomyopathy ([facility] [address] 02/26/2017  Abnormal CT scan, chest 02/26/2017  Pleural effusion 02/05/2017  Hypotension due to drugs 02/05/2017  Pneumonia 01/05/2017 Current Outpatient Medications Medication Sig Dispense Refill  THIAMINE HCL, VITAMIN B1, PO Take  by Mouth.     
 cholecalciferol, VITAMIN D3, (VITAMIN D3) 5,000 unit tab tablet Take  by mouth daily.  cyanocobalamin 1,000 mcg tablet Take 1,000 mcg by mouth daily.  aspirin 81 mg chewable tablet Take 1 Tab by mouth daily. 30 Tab 0  
 albuterol (PROVENTIL HFA, VENTOLIN HFA, PROAIR HFA) 90 mcg/actuation inhaler Take 1 Puff by inhalation every four (4) hours as needed for Wheezing. 1 Inhaler 0  
 budesonide-formoterol (SYMBICORT) 160-4.5 mcg/actuation HFA inhaler Take 2 Puffs by inhalation two (2) times a day. Allergies Allergen Reactions  Penicillins Unknown (comments) Per patient, 40 years ago, but has not taken since Past Medical History:  
Diagnosis Date  Chronic obstructive pulmonary disease ([facility] [address] Past Surgical History:  
Procedure Laterality Date [address] UNLISTED  HX GYN No family history on file. Social History Tobacco Use Smoking Status Current Every Day Smoker  Packs/day: 0.50 Smokeless Tobacco Never Used Review of Systems, additional: 
Constitutional: negative Eyes: negative Respiratory: negative Cardiovascular: negative Gastrointestinal: negative Musculoskeletal:negative Neurological: negative Behvioral/Psych: negative Endocrine: negative ENT: negative Objective:  
 
Visit Vitals /72 Pulse 99 Ht 5' 5\" (1.651 m) Wt 116 lb (52.6 kg) SpO2 91% BMI 19.30 kg/m² General:  alert, cooperative, no distress Chest Wall: inspection normal - no chest wall deformities or tenderness, respiratory effort normal  
Lung: clear to auscultation bilaterally Heart:  normal rate and regular rhythm, S1 and S2 normal, no murmurs noted, no gallops noted Abdomen: soft, non-tender. Bowel sounds normal. No masses,  no organomegaly Extremities: extremities normal, atraumatic, no cyanosis or edema Skin: no rashes Neuro: alert, oriented, normal speech, no focal findings or movement disorder noted Assessment/Plan: ICD-10-CM ICD-9-CM 1. Hypotension due to drugs, resolved I95.2 458.8 E980.5 2. Cardiomyopathy, unspecified type, Systolic function near normal 2/22/2017. Dyspnea on exertion with increased symptoms. .  Repeat echo. Return in 3 months I42.9 425.4 3. Abnormal CT scan, chest, followed by Dr Marzette Opitz R93.8 793.2 4. Hx of sepsis Z86.19 V12.09   
5       Normal nuclear stress test 01/16/2017 6       Falls, will request physical therapy assessment

## 2019-04-17 ENCOUNTER — OFFICE VISIT (OUTPATIENT)
Dept: CARDIOLOGY CLINIC | Age: 74
End: 2019-04-17

## 2019-04-17 VITALS
SYSTOLIC BLOOD PRESSURE: 105 MMHG | BODY MASS INDEX: 18.66 KG/M2 | HEART RATE: 96 BPM | HEIGHT: 65 IN | OXYGEN SATURATION: 93 % | WEIGHT: 112 LBS | DIASTOLIC BLOOD PRESSURE: 64 MMHG

## 2019-04-17 DIAGNOSIS — Z92.89 HISTORY OF STRESS TEST: ICD-10-CM

## 2019-04-17 DIAGNOSIS — I95.2 HYPOTENSION DUE TO DRUGS: ICD-10-CM

## 2019-04-17 DIAGNOSIS — I42.9 CARDIOMYOPATHY, UNSPECIFIED TYPE (HCC): Primary | ICD-10-CM

## 2019-04-17 DIAGNOSIS — Z91.81 AT RISK FOR FALLS: ICD-10-CM

## 2019-04-17 DIAGNOSIS — R06.09 DOE (DYSPNEA ON EXERTION): ICD-10-CM

## 2019-04-17 DIAGNOSIS — R54 FRAILTY: ICD-10-CM

## 2019-04-17 DIAGNOSIS — Z86.19 HX OF SEPSIS: ICD-10-CM

## 2019-04-17 NOTE — PROGRESS NOTES
1. Have you been to the ER, urgent care clinic since your last visit? Hospitalized since your last visit? No    2. Have you seen or consulted any other health care providers outside of the [address] [place] [address] since your last visit? Include any pap smears or colon screening.  No

## 2019-04-20 NOTE — PROGRESS NOTES
Subjective:      Kenyetta Gordon is in the office today for cardiac reevaluation. She is a 72-year-old woman that was hospitalized at [facility] in October of 2015. At that time, she had pneumonia and sepsis syndrome. During the course of her evaluation, she had an echocardiogram, which demonstrated marked reduction of left ventricular systolic function. Her ejection fraction was 30-35%. She was started on a combination of low-dose betablockers and ACE inhibitors. A nuclear stress test was done prior to her discharge which showed no evidence for ischemia. Her ejection fraction was 69% on the nuclear stress test that was done on 01/16/2017. Her echocardiogram was repeated in February of 2017. Her EF had returned to near normal at 50-55%. She continued to have low blood pressures while on the betablocker and ACE inhibitor, and the meds were discontinued. She did have an abnormal CT scan, which has been followed up by Dr. Ghulam Beck. In the office today, she tells me that evaluation is complete and there was no significant pathology. In the office today, she reports that she is feeling \"pretty good \". She has started a herb garden. She has had no chest pain. Her main complaint is that of back pain. The pain seems to originate from her SI joint. Her breathing has been \"good \". Patient Active Problem List    Diagnosis Date Noted    PULLIAM (dyspnea on exertion) 01/29/2019    At risk for falls 01/29/2019    Frailty 11/18/2018    History of stress test 10/22/2017    Hx of sepsis 04/12/2017    Cardiomyopathy ([facility] [address] 02/26/2017    Abnormal CT scan, chest 02/26/2017    Pleural effusion 02/05/2017    Hypotension due to drugs 02/05/2017    Pneumonia 01/05/2017     Current Outpatient Medications   Medication Sig Dispense Refill    THIAMINE HCL, VITAMIN B1, PO Take  by Mouth.  cholecalciferol, VITAMIN D3, (VITAMIN D3) 5,000 unit tab tablet Take  by mouth daily.       cyanocobalamin 1,000 mcg tablet Take 1,000 mcg by mouth daily.  aspirin 81 mg chewable tablet Take 1 Tab by mouth daily. 30 Tab 0    albuterol (PROVENTIL HFA, VENTOLIN HFA, PROAIR HFA) 90 mcg/actuation inhaler Take 1 Puff by inhalation every four (4) hours as needed for Wheezing. 1 Inhaler 0    budesonide-formoterol (SYMBICORT) 160-4.5 mcg/actuation HFA inhaler Take 2 Puffs by inhalation two (2) times a day. Allergies   Allergen Reactions    Penicillins Unknown (comments)     Per patient, 40 years ago, but has not taken since     Past Medical History:   Diagnosis Date    Chronic obstructive pulmonary disease ([facility] [address]      Past Surgical History:   Procedure Laterality Date    ABDOMEN SURGERY PROC UNLISTED      HX GYN       No family history on file. Social History     Tobacco Use   Smoking Status Current Every Day Smoker    Packs/day: 0.50   Smokeless Tobacco Never Used          Review of Systems, additional:  Constitutional: negative  Eyes: negative  Respiratory: negative  Cardiovascular: negative  Gastrointestinal: negative  Musculoskeletal:negative  Neurological: negative  Behvioral/Psych: negative  Endocrine: negative  ENT: negative    Objective:     Visit Vitals  /64   Pulse 96   Ht 5' 5\" (1.651 m)   Wt 112 lb (50.8 kg)   SpO2 93%   BMI 18.64 kg/m²     General:  alert, cooperative, no distress   Chest Wall: inspection normal - no chest wall deformities or tenderness, respiratory effort normal   Lung: clear to auscultation bilaterally   Heart:  normal rate and regular rhythm, S1 and S2 normal, no murmurs noted, no gallops noted   Abdomen: soft, non-tender. Bowel sounds normal. No masses,  no organomegaly   Extremities: extremities normal, atraumatic, no cyanosis or edema Skin: no rashes   Neuro: alert, oriented, normal speech, no focal findings or movement disorder noted         Assessment/Plan:       ICD-10-CM ICD-9-CM    1. Hypotension due to drugs, resolved I95.2 458.8      E980.5    2. Cardiomyopathy, unspecified type, Systolic function near normal 2/22/2017. Stable. Return in 4 months I42.9 425.4    3. Abnormal CT scan, chest, followed by Dr Desire Correa R93.8 793.2    4.  Hx of sepsis Z86.19 V12.09    5       Normal nuclear stress test 01/16/2017  6       Falls, will requested physical therapy assessment, but the patient did not follow-up

## 2019-07-29 ENCOUNTER — HOSPITAL ENCOUNTER (OUTPATIENT)
Dept: ULTRASOUND IMAGING | Age: 74
Discharge: HOME | End: 2019-07-29
Attending: FAMILY MEDICINE
Payer: MEDICARE

## 2019-07-29 DIAGNOSIS — R74.8 ELEVATED LIVER ENZYMES: ICD-10-CM

## 2019-07-29 DIAGNOSIS — K76.89 LIVER DYSFUNCTION: ICD-10-CM

## 2019-07-29 PROCEDURE — 76705 ECHO EXAM OF ABDOMEN: CPT

## 2019-08-14 ENCOUNTER — OFFICE VISIT (OUTPATIENT)
Dept: CARDIOLOGY CLINIC | Age: 74
End: 2019-08-14

## 2019-08-14 VITALS
DIASTOLIC BLOOD PRESSURE: 68 MMHG | HEART RATE: 93 BPM | SYSTOLIC BLOOD PRESSURE: 110 MMHG | WEIGHT: 109 LBS | BODY MASS INDEX: 18.14 KG/M2 | OXYGEN SATURATION: 90 %

## 2019-08-14 DIAGNOSIS — I42.9 CARDIOMYOPATHY, UNSPECIFIED TYPE (HCC): Primary | ICD-10-CM

## 2019-08-14 NOTE — PROGRESS NOTES
1. Have you been to the ER, urgent care clinic since your last visit? Hospitalized since your last visit? No     2. Have you seen or consulted any other health care providers outside of the [address] [place] [address] since your last visit? Include any pap smears or colon screening.   No

## 2019-08-20 NOTE — PROGRESS NOTES
Subjective:      Jose Manuel Pool is in the office today for cardiac reevaluation. She is a 68-year-old woman that was hospitalized at [facility] in October of 2015. At that time, she had pneumonia and sepsis syndrome. During the course of her evaluation, she had an echocardiogram, which demonstrated a marked reduction in left ventricular systolic function. Her ejection fraction was 30-35%. She was started on a combination of low-dose betablockers and ACE inhibitors. A nuclear stress test was done prior to her discharge which showed no evidence for ischemia. Her ejection fraction was 69% on the nuclear stress test that was done on 01/16/2017. Her echocardiogram was repeated in February of 2017. Her EF had returned to near normal at 50-55%. She continued to have low blood pressures while on the betablocker and ACE inhibitor, and the meds were discontinued. She did have an abnormal chest CT scan, which has been followed up by Dr. Mary Jensen. She was informed that the evaluation was complete and that there was no significant pathology. In the office today, she has been doing reasonably well. Her breathing is unchanged. She has had no chest pain. She has had no palpitations. She has had no peripheral swelling. She did lose an additional 3 pounds since her last appointment. .           Patient Active Problem List    Diagnosis Date Noted    PULLIAM (dyspnea on exertion) 01/29/2019    At risk for falls 01/29/2019    Frailty 11/18/2018    History of stress test 10/22/2017    Hx of sepsis 04/12/2017    Cardiomyopathy ([facility] [address] 02/26/2017    Abnormal CT scan, chest 02/26/2017    Pleural effusion 02/05/2017    Hypotension due to drugs 02/05/2017    Pneumonia 01/05/2017     Current Outpatient Medications   Medication Sig Dispense Refill    THIAMINE HCL, VITAMIN B1, PO Take  by Mouth.  cholecalciferol, VITAMIN D3, (VITAMIN D3) 5,000 unit tab tablet Take  by mouth daily.       cyanocobalamin 1,000 mcg tablet Take 1,000 mcg by mouth daily.  aspirin 81 mg chewable tablet Take 1 Tab by mouth daily. 30 Tab 0    albuterol (PROVENTIL HFA, VENTOLIN HFA, PROAIR HFA) 90 mcg/actuation inhaler Take 1 Puff by inhalation every four (4) hours as needed for Wheezing. 1 Inhaler 0    budesonide-formoterol (SYMBICORT) 160-4.5 mcg/actuation HFA inhaler Take 2 Puffs by inhalation two (2) times a day. Allergies   Allergen Reactions    Penicillins Unknown (comments)     Per patient, 40 years ago, but has not taken since     Past Medical History:   Diagnosis Date    Chronic obstructive pulmonary disease ([facility] [address]      Past Surgical History:   Procedure Laterality Date    ABDOMEN SURGERY PROC UNLISTED      HX GYN       No family history on file. Social History     Tobacco Use   Smoking Status Current Every Day Smoker    Packs/day: 0.50   Smokeless Tobacco Never Used          Review of Systems, additional:  Constitutional: negative  Eyes: negative  Respiratory: negative  Cardiovascular: negative  Gastrointestinal: negative  Musculoskeletal:negative  Neurological: negative  Behvioral/Psych: negative  Endocrine: negative  ENT: negative    Objective:     Visit Vitals  /68   Pulse 93   Wt 109 lb (49.4 kg)   SpO2 90%   BMI 18.14 kg/m²     General:  alert, cooperative, no distress   Chest Wall: inspection normal - no chest wall deformities or tenderness, respiratory effort normal   Lung: clear to auscultation bilaterally   Heart:  normal rate and regular rhythm, S1 and S2 normal, no murmurs noted, no gallops noted   Abdomen: soft, non-tender. Bowel sounds normal. No masses,  no organomegaly   Extremities: extremities normal, atraumatic, no cyanosis or edema Skin: no rashes   Neuro: alert, oriented, normal speech, no focal findings or movement disorder noted         Assessment/Plan:       ICD-10-CM ICD-9-CM    1. Hypotension due to drugs, resolved I95.2 458.8      E980.5    2.  Cardiomyopathy, unspecified type, Systolic function near normal 2/22/2017. Stable. Return in 6 months I42.9 425.4    3. Abnormal CT scan, chest, followed by Dr Christi Thompson R93.8 793.2    4.  Hx of sepsis Z86.19 V12.09    5       Normal nuclear stress test 01/16/2017  6       Falls, no additional fall since last appointment in April 2019

## 2020-02-28 ENCOUNTER — OFFICE VISIT (OUTPATIENT)
Dept: CARDIOLOGY CLINIC | Age: 75
End: 2020-02-28

## 2020-02-28 VITALS
HEART RATE: 93 BPM | HEIGHT: 65 IN | DIASTOLIC BLOOD PRESSURE: 63 MMHG | WEIGHT: 108 LBS | SYSTOLIC BLOOD PRESSURE: 112 MMHG | BODY MASS INDEX: 17.99 KG/M2 | OXYGEN SATURATION: 96 %

## 2020-02-28 DIAGNOSIS — I42.9 CARDIOMYOPATHY, UNSPECIFIED TYPE (HCC): Primary | ICD-10-CM

## 2020-02-28 NOTE — PROGRESS NOTES
1. Have you been to the ER, urgent care clinic since your last visit? Hospitalized since your last visit? No     2. Have you seen or consulted any other health care providers outside of the [address] [place] [address] since your last visit? Include any pap smears or colon screening.   No

## 2020-03-01 NOTE — PROGRESS NOTES
Subjective:      Alison Arnold is in the office today for cardiac reevaluation. She is a 77-year-old woman that was hospitalized at [facility] in October of 2015. At that time, she had pneumonia and sepsis syndrome. During the course of her evaluation, she had an echocardiogram, which demonstrated a marked reduction in left ventricular systolic function. Her ejection fraction was 30-35%. She was started on a combination of low-dose betablockers and ACE inhibitors. A nuclear stress test was done prior to her discharge which showed no evidence for ischemia. Her ejection fraction was 69% on the nuclear stress test that was done on 01/16/2017. Her echocardiogram was repeated in February of 2017. Her EF had returned to near normal at 50-55%. She continued to have low blood pressures while on the betablocker and ACE inhibitor, and the meds were discontinued. She did have an abnormal chest CT scan, which has been followed up by Dr. Car Kaiser. She was informed that the evaluation was complete and that there was no significant pathology. In the office today, she reports that she feels \"good\". Her breathing is \"about the same \". She has had no chest pain. She has had no recent falls. She remains quite sedentary. She has no specific complaints in the office today. Patient Active Problem List    Diagnosis Date Noted    PULLIAM (dyspnea on exertion) 01/29/2019    At risk for falls 01/29/2019    Frailty 11/18/2018    History of stress test 10/22/2017    Hx of sepsis 04/12/2017    Cardiomyopathy ([facility] [address] 02/26/2017    Abnormal CT scan, chest 02/26/2017    Pleural effusion 02/05/2017    Hypotension due to drugs 02/05/2017    Pneumonia 01/05/2017     Current Outpatient Medications   Medication Sig Dispense Refill    THIAMINE HCL, VITAMIN B1, PO Take  by Mouth.  cholecalciferol, VITAMIN D3, (VITAMIN D3) 5,000 unit tab tablet Take  by mouth daily.       cyanocobalamin 1,000 mcg tablet Take 1,000 mcg by mouth daily.  aspirin 81 mg chewable tablet Take 1 Tab by mouth daily. 30 Tab 0    albuterol (PROVENTIL HFA, VENTOLIN HFA, PROAIR HFA) 90 mcg/actuation inhaler Take 1 Puff by inhalation every four (4) hours as needed for Wheezing. 1 Inhaler 0    budesonide-formoterol (SYMBICORT) 160-4.5 mcg/actuation HFA inhaler Take 2 Puffs by inhalation two (2) times a day. Allergies   Allergen Reactions    Penicillins Unknown (comments)     Per patient, 40 years ago, but has not taken since     Past Medical History:   Diagnosis Date    Chronic obstructive pulmonary disease ([facility] [address]      Past Surgical History:   Procedure Laterality Date    ABDOMEN SURGERY PROC UNLISTED      HX GYN       No family history on file. Social History     Tobacco Use   Smoking Status Current Every Day Smoker    Packs/day: 0.50   Smokeless Tobacco Never Used          Review of Systems, additional:  Constitutional: negative  Eyes: negative  Respiratory: negative  Cardiovascular: negative  Gastrointestinal: negative  Musculoskeletal:negative  Neurological: negative  Behvioral/Psych: negative  Endocrine: negative  ENT: negative    Objective:     Visit Vitals  /63   Pulse 93   Ht 5' 5\" (1.651 m)   Wt 49 kg (108 lb)   SpO2 96%   BMI 17.97 kg/m²     General:  alert, cooperative, no distress   Chest Wall: inspection normal - no chest wall deformities or tenderness, respiratory effort normal   Lung: clear to auscultation bilaterally   Heart:  normal rate and regular rhythm, S1 and S2 normal, no murmurs noted, no gallops noted   Abdomen: soft, non-tender. Bowel sounds normal. No masses,  no organomegaly   Extremities: extremities normal, atraumatic, no cyanosis or edema Skin: no rashes   Neuro: alert, oriented, normal speech, no focal findings or movement disorder noted         Assessment/Plan:       ICD-10-CM ICD-9-CM    1. Hypotension due to drugs, resolved I95.2 458.8      E980.5    2.  Cardiomyopathy, unspecified type, Systolic function near normal 2/22/2017. Stable. Return in 6 months I42.9 425.4    3. Abnormal CT scan, chest, followed by Dr Car Kaiser R93.8 793.2    4. Hx of sepsis Z86.19 V12.09    5       Normal nuclear stress test 01/16/2017  6       Falls, no additional fall since last appointment.

## 2020-06-03 NOTE — PROGRESS NOTES
0730 Bedside and Verbal shift change report given to  [address] [facility]  RNs (oncoming nurse) by Friday harbor, RN (offgoing nurse). Report included the following information SBAR, Kardex, Intake/Output and MAR. Call bell in reach.          0720 Bed side verbal shift report given to oncoming nurse Debra Murray RN,patient has no signs of distress,at this time still on oxygen by nasal cannula at 2l /m ,breathing treatments and medications given as scheduled. No

## 2020-07-16 ENCOUNTER — HOSPITAL ENCOUNTER (OUTPATIENT)
Dept: CT IMAGING | Age: 75
Discharge: HOME | End: 2020-07-16
Attending: FAMILY MEDICINE
Payer: SELF-PAY

## 2020-07-16 DIAGNOSIS — E78.5 HYPERLIPIDEMIA: ICD-10-CM

## 2020-07-16 PROCEDURE — 75571 CT HRT W/O DYE W/CA TEST: CPT

## 2020-07-20 ENCOUNTER — TELEPHONE (OUTPATIENT)
Dept: OTHER | Age: 75
End: 2020-07-20

## 2020-07-20 NOTE — TELEPHONE ENCOUNTER
Will return call tomorrow afternoon,  states that she is a late sleeper and will be available after 2pm.

## 2020-07-29 ENCOUNTER — TELEPHONE (OUTPATIENT)
Dept: OTHER | Age: 75
End: 2020-07-29

## 2020-07-29 NOTE — TELEPHONE ENCOUNTER
Patient identity verified and matched to demographic data. Patient notified of Coronary Calcium Score of 282. Patient education provided to maintain healthy lifestyle, healthy diet, and regular exercise. Follow up recommended with Primary Care MD Lisandro Tejeda and she already has an appointment with Dr. Malathi Orantes in August, whom is her primary Cardiologist.    Patient verbalized understanding of results, patient education, and follow up care.

## 2020-10-23 ENCOUNTER — TRANSCRIBE ORDER (OUTPATIENT)
Dept: SCHEDULING | Age: 75
End: 2020-10-23

## 2020-10-23 DIAGNOSIS — Z13.820 SPECIAL SCREENING FOR OSTEOPOROSIS: Primary | ICD-10-CM

## 2020-10-23 DIAGNOSIS — M81.0 SENILE OSTEOPOROSIS: ICD-10-CM

## 2020-10-23 DIAGNOSIS — Z12.31 VISIT FOR SCREENING MAMMOGRAM: Primary | ICD-10-CM

## 2020-10-26 ENCOUNTER — OFFICE VISIT (OUTPATIENT)
Dept: CARDIOLOGY CLINIC | Age: 75
End: 2020-10-26
Payer: MEDICARE

## 2020-10-26 VITALS
WEIGHT: 107 LBS | RESPIRATION RATE: 20 BRPM | DIASTOLIC BLOOD PRESSURE: 69 MMHG | TEMPERATURE: 97.6 F | OXYGEN SATURATION: 90 % | HEART RATE: 90 BPM | SYSTOLIC BLOOD PRESSURE: 131 MMHG | BODY MASS INDEX: 17.81 KG/M2

## 2020-10-26 DIAGNOSIS — R06.09 DOE (DYSPNEA ON EXERTION): ICD-10-CM

## 2020-10-26 DIAGNOSIS — I42.9 CARDIOMYOPATHY, UNSPECIFIED TYPE (HCC): Primary | ICD-10-CM

## 2020-10-26 PROCEDURE — G8400 PT W/DXA NO RESULTS DOC: HCPCS | Performed by: INTERNAL MEDICINE

## 2020-10-26 PROCEDURE — G8510 SCR DEP NEG, NO PLAN REQD: HCPCS | Performed by: INTERNAL MEDICINE

## 2020-10-26 PROCEDURE — G8427 DOCREV CUR MEDS BY ELIG CLIN: HCPCS | Performed by: INTERNAL MEDICINE

## 2020-10-26 PROCEDURE — G8419 CALC BMI OUT NRM PARAM NOF/U: HCPCS | Performed by: INTERNAL MEDICINE

## 2020-10-26 PROCEDURE — 93000 ELECTROCARDIOGRAM COMPLETE: CPT | Performed by: INTERNAL MEDICINE

## 2020-10-26 PROCEDURE — G8536 NO DOC ELDER MAL SCRN: HCPCS | Performed by: INTERNAL MEDICINE

## 2020-10-26 PROCEDURE — 99214 OFFICE O/P EST MOD 30 MIN: CPT | Performed by: INTERNAL MEDICINE

## 2020-10-26 RX ORDER — ATORVASTATIN CALCIUM 20 MG/1
TABLET, FILM COATED ORAL
COMMUNITY
Start: 2020-10-23

## 2020-10-26 NOTE — PROGRESS NOTES
Morgan Gomez presents today for   Chief Complaint   Patient presents with    Follow-up       Hailee Linda preferred language for health care discussion is english/other. Is someone accompanying this pt? Y, spouse    Is the patient using any DME equipment during [address]? no    Depression Screening:  3 most recent PHQ Screens 10/26/2020   Little interest or pleasure in doing things Not at all   Feeling down, depressed, irritable, or hopeless Not at all   Total Score PHQ 2 0       Learning Assessment:  Learning Assessment 1/27/2017   PRIMARY LEARNER Patient   PRIMARY LANGUAGE ENGLISH   LEARNER PREFERENCE PRIMARY OTHER (COMMENT)   ANSWERED BY Patient   RELATIONSHIP SELF       Abuse Screening:  No flowsheet data found. Fall Risk  Fall Risk Assessment, last 12 mths 10/26/2020   Able to walk? No   Fall in past 12 months? -       Pt currently taking Anticoagulant therapy? no    Coordination of Care:  1. Have you been to the ER, urgent care clinic since your last visit? Hospitalized since your last visit? n    2. Have you seen or consulted any other health care providers outside of the [address] [place] [address] since your last visit? Include any pap smears or colon screening.  n

## 2020-10-31 NOTE — PROGRESS NOTES
Subjective:      Tan Hennessy is in the office today for cardiac reevaluation. She is a 79-year-old woman that was hospitalized at [facility] in October of 2015. At that time, she had pneumonia and sepsis syndrome. During the course of her evaluation, she had an echocardiogram, which demonstrated a marked reduction in left ventricular systolic function. Her ejection fraction was 30-35%. She was started on a combination of low-dose betablockers and ACE inhibitors. A nuclear stress test was done prior to her discharge which showed no evidence for ischemia. Her ejection fraction was 69% on the nuclear stress test that was done on 01/16/2017. Her echocardiogram was repeated in February of 2017. Her EF had returned to near normal at 50-55%. She continued to have low blood pressures while on the betablocker and ACE inhibitor, and the meds were discontinued. She did have an abnormal chest CT scan, which has been followed up by Dr. Alyson Coughlin. She was informed that the evaluation was complete and that there was no significant pathology. In the office today, she reports that she is experiencing some dyspnea with exertion. She says  walking around the house will make her short of breath at times. She no longer sees Dr. Alyson Coughlin from the pulmonary service. She had no PND or orthopnea. She had no palpitations. She has some occasional mild swelling of her feet. She continues to have some problems with balance.   Her  had relayed to me at his last appointment that he was very concerned about her      Patient Active Problem List    Diagnosis Date Noted    PULLIAM (dyspnea on exertion) 01/29/2019    At risk for falls 01/29/2019    Frailty 11/18/2018    History of stress test 10/22/2017    Hx of sepsis 04/12/2017    Cardiomyopathy ([facility] [address] 02/26/2017    Abnormal CT scan, chest 02/26/2017    Pleural effusion 02/05/2017    Hypotension due to drugs 02/05/2017    Pneumonia 01/05/2017 Current Outpatient Medications   Medication Sig Dispense Refill    atorvastatin (LIPITOR) 20 mg tablet TK 1 T PO QHS      THIAMINE HCL, VITAMIN B1, PO Take  by Mouth.  cholecalciferol, VITAMIN D3, (VITAMIN D3) 5,000 unit tab tablet Take  by mouth daily.  cyanocobalamin 1,000 mcg tablet Take 1,000 mcg by mouth daily.  aspirin 81 mg chewable tablet Take 1 Tab by mouth daily. 30 Tab 0    albuterol (PROVENTIL HFA, VENTOLIN HFA, PROAIR HFA) 90 mcg/actuation inhaler Take 1 Puff by inhalation every four (4) hours as needed for Wheezing. 1 Inhaler 0    budesonide-formoterol (SYMBICORT) 160-4.5 mcg/actuation HFA inhaler Take 2 Puffs by inhalation two (2) times a day. Allergies   Allergen Reactions    Penicillins Unknown (comments)     Per patient, 40 years ago, but has not taken since     Past Medical History:   Diagnosis Date    Chronic obstructive pulmonary disease ([facility] [address]      Past Surgical History:   Procedure Laterality Date    ABDOMEN SURGERY PROC UNLISTED      HX GYN       No family history on file.   Social History     Tobacco Use   Smoking Status Current Every Day Smoker    Packs/day: 0.50   Smokeless Tobacco Never Used          Review of Systems, additional:  Constitutional: negative  Eyes: negative  Respiratory: negative  Cardiovascular: negative  Gastrointestinal: negative  Musculoskeletal:negative  Neurological: negative  Behvioral/Psych: negative  Endocrine: negative  ENT: negative    Objective:     Visit Vitals  /69 (BP 1 Location: Left arm, BP Patient Position: Sitting)   Pulse 90   Temp 97.6 °F (36.4 °C) (Temporal)   Resp 20   Wt 107 lb (48.5 kg)   SpO2 90%   BMI 17.81 kg/m²     General:  alert, cooperative, no distress   Chest Wall: inspection normal - no chest wall deformities or tenderness, respiratory effort normal   Lung: clear to auscultation bilaterally   Heart:  normal rate and regular rhythm, S1 and S2 normal, no murmurs noted, no gallops noted   Abdomen: soft, non-tender. Bowel sounds normal. No masses,  no organomegaly   Extremities: extremities normal, atraumatic, no cyanosis or edema Skin: no rashes   Neuro: alert, oriented, normal speech, no focal findings or movement disorder noted         Assessment/Plan:       ICD-10-CM ICD-9-CM    1. Hypotension due to drugs, resolved I95.2 458.8      E980.5    2. Cardiomyopathy, unspecified type, Systolic function near normal 2/22/2017. Stable. Return in 4 months I42.9 425.4    3. Abnormal CT scan, chest, followed by Dr Iker Long R93.8 793.2    4. Hx of sepsis Z86.19 V12.09    5       Normal nuclear stress test 01/16/2017  6       Falls, no additional fall since last appointment.   Offered balance/PT opportunity in the past.

## 2021-01-01 ENCOUNTER — OFFICE VISIT (OUTPATIENT)
Dept: CARDIOLOGY CLINIC | Age: 76
End: 2021-01-01
Payer: MEDICARE

## 2021-01-01 VITALS
RESPIRATION RATE: 16 BRPM | SYSTOLIC BLOOD PRESSURE: 115 MMHG | WEIGHT: 107 LBS | BODY MASS INDEX: 17.81 KG/M2 | OXYGEN SATURATION: 94 % | HEART RATE: 90 BPM | DIASTOLIC BLOOD PRESSURE: 64 MMHG | TEMPERATURE: 99.1 F

## 2021-01-01 DIAGNOSIS — R06.89 DECREASED BREATH SOUNDS: Primary | ICD-10-CM

## 2021-01-01 DIAGNOSIS — R06.09 DOE (DYSPNEA ON EXERTION): ICD-10-CM

## 2021-01-01 PROCEDURE — G8400 PT W/DXA NO RESULTS DOC: HCPCS | Performed by: INTERNAL MEDICINE

## 2021-01-01 PROCEDURE — 99214 OFFICE O/P EST MOD 30 MIN: CPT | Performed by: INTERNAL MEDICINE

## 2021-01-01 PROCEDURE — G8427 DOCREV CUR MEDS BY ELIG CLIN: HCPCS | Performed by: INTERNAL MEDICINE

## 2021-01-01 PROCEDURE — G8536 NO DOC ELDER MAL SCRN: HCPCS | Performed by: INTERNAL MEDICINE

## 2021-01-01 PROCEDURE — 93000 ELECTROCARDIOGRAM COMPLETE: CPT | Performed by: INTERNAL MEDICINE

## 2021-01-01 PROCEDURE — 1101F PT FALLS ASSESS-DOCD LE1/YR: CPT | Performed by: INTERNAL MEDICINE

## 2021-01-01 PROCEDURE — 1090F PRES/ABSN URINE INCON ASSESS: CPT | Performed by: INTERNAL MEDICINE

## 2021-01-01 PROCEDURE — G8510 SCR DEP NEG, NO PLAN REQD: HCPCS | Performed by: INTERNAL MEDICINE

## 2021-01-01 PROCEDURE — G8419 CALC BMI OUT NRM PARAM NOF/U: HCPCS | Performed by: INTERNAL MEDICINE

## 2021-01-21 ENCOUNTER — TRANSCRIBE ORDER (OUTPATIENT)
Dept: REGISTRATION | Age: 76
End: 2021-01-21

## 2021-01-21 ENCOUNTER — HOSPITAL ENCOUNTER (OUTPATIENT)
Dept: GENERAL RADIOLOGY | Age: 76
Discharge: HOME | End: 2021-01-21
Payer: MEDICARE

## 2021-01-21 DIAGNOSIS — R07.9 CHEST PAIN: Primary | ICD-10-CM

## 2021-01-21 DIAGNOSIS — R07.9 CHEST PAIN: ICD-10-CM

## 2021-01-21 PROCEDURE — 71046 X-RAY EXAM CHEST 2 VIEWS: CPT

## 2021-02-15 ENCOUNTER — OFFICE VISIT (OUTPATIENT)
Dept: CARDIOLOGY CLINIC | Age: 76
End: 2021-02-15
Payer: MEDICARE

## 2021-02-15 VITALS
SYSTOLIC BLOOD PRESSURE: 141 MMHG | BODY MASS INDEX: 18.8 KG/M2 | WEIGHT: 113 LBS | TEMPERATURE: 97.9 F | DIASTOLIC BLOOD PRESSURE: 73 MMHG | OXYGEN SATURATION: 92 % | HEART RATE: 94 BPM

## 2021-02-15 DIAGNOSIS — I42.9 CARDIOMYOPATHY, UNSPECIFIED TYPE (HCC): ICD-10-CM

## 2021-02-15 DIAGNOSIS — R06.09 DOE (DYSPNEA ON EXERTION): Primary | ICD-10-CM

## 2021-02-15 PROCEDURE — 99214 OFFICE O/P EST MOD 30 MIN: CPT | Performed by: INTERNAL MEDICINE

## 2021-02-15 PROCEDURE — G8510 SCR DEP NEG, NO PLAN REQD: HCPCS | Performed by: INTERNAL MEDICINE

## 2021-02-15 PROCEDURE — 1101F PT FALLS ASSESS-DOCD LE1/YR: CPT | Performed by: INTERNAL MEDICINE

## 2021-02-15 PROCEDURE — G8427 DOCREV CUR MEDS BY ELIG CLIN: HCPCS | Performed by: INTERNAL MEDICINE

## 2021-02-15 PROCEDURE — G8536 NO DOC ELDER MAL SCRN: HCPCS | Performed by: INTERNAL MEDICINE

## 2021-02-15 PROCEDURE — 3017F COLORECTAL CA SCREEN DOC REV: CPT | Performed by: INTERNAL MEDICINE

## 2021-02-15 PROCEDURE — G8420 CALC BMI NORM PARAMETERS: HCPCS | Performed by: INTERNAL MEDICINE

## 2021-02-15 PROCEDURE — 1090F PRES/ABSN URINE INCON ASSESS: CPT | Performed by: INTERNAL MEDICINE

## 2021-02-15 PROCEDURE — G8400 PT W/DXA NO RESULTS DOC: HCPCS | Performed by: INTERNAL MEDICINE

## 2021-02-15 NOTE — PROGRESS NOTES
Meghann Smith presents today for   Chief Complaint   Patient presents with    Follow-up       Hailee Linda preferred language for health care discussion is english/other. Personal Protective Equipment:   Personal Protective Equipment was used including: mask-surgical and hands-gloves. Patient was placed on no precaution(s). Patient was masked. Is someone accompanying this pt? Yes     Is the patient using any DME equipment during [address]? no    Depression Screening:  3 most recent PHQ Screens 2/15/2021   Little interest or pleasure in doing things Not at all   Feeling down, depressed, irritable, or hopeless Not at all   Total Score PHQ 2 0       Learning Assessment:  Learning Assessment 1/27/2017   PRIMARY LEARNER Patient   PRIMARY LANGUAGE ENGLISH   LEARNER PREFERENCE PRIMARY OTHER (COMMENT)   ANSWERED BY Patient   RELATIONSHIP SELF       Abuse Screening:  No flowsheet data found. Fall Risk  Fall Risk Assessment, last 12 mths 2/15/2021   Able to walk? Yes   Fall in past 12 months? 0   Do you feel unsteady? 0   Are you worried about falling 0       Pt currently taking Anticoagulant therapy? no    Coordination of Care:  1. Have you been to the ER, urgent care clinic since your last visit? Hospitalized since your last visit? no    2. Have you seen or consulted any other health care providers outside of the [address] [place] [address] since your last visit? Include any pap smears or colon screening.  no

## 2021-02-22 NOTE — PROGRESS NOTES
Subjective:      Tashi Manning is in the office today for cardiac reevaluation. She is a 78-year-old woman that was hospitalized at [facility] in October of 2015. At that time, she had pneumonia and sepsis syndrome. During the course of her evaluation, she had an echocardiogram, which demonstrated a marked reduction in left ventricular systolic function. Her ejection fraction was 30-35%. She was started on a combination of low-dose betablockers and ACE inhibitors. A nuclear stress test was done prior to her discharge which showed no evidence for ischemia. Her ejection fraction was 69% on the nuclear stress test that was done on 01/16/2017. Her echocardiogram was repeated in February of 2017. Her EF had returned to near normal at 50-55%. She continued to have low blood pressures while on the betablocker and ACE inhibitor, and the meds were discontinued. She did have an abnormal chest CT scan, which has been followed up by Dr. Ute Macias. She was informed that the evaluation was complete and that there was no significant problem. In the office today, she reports that she ongoing dyspnea with exertion. She reports she has no energy. She has been experiencing  tightness in her lower chest.  It occurs with activity and sometimes at rest.  She has tried nothing in particular for relief. It lasts minutes at a time. She has not had this in the past.  Also reports she has \"no balance \".     Patient Active Problem List    Diagnosis Date Noted    PULLIAM (dyspnea on exertion) 01/29/2019    At risk for falls 01/29/2019    Frailty 11/18/2018    History of stress test 10/22/2017    Hx of sepsis 04/12/2017    Cardiomyopathy ([facility] [address] 02/26/2017    Abnormal CT scan, chest 02/26/2017    Pleural effusion 02/05/2017    Hypotension due to drugs 02/05/2017    Pneumonia 01/05/2017     Current Outpatient Medications   Medication Sig Dispense Refill    atorvastatin (LIPITOR) 20 mg tablet TK 1 T PO QHS  THIAMINE HCL, VITAMIN B1, PO Take  by Mouth.  cholecalciferol, VITAMIN D3, (VITAMIN D3) 5,000 unit tab tablet Take  by mouth daily.  cyanocobalamin 1,000 mcg tablet Take 1,000 mcg by mouth daily.  aspirin 81 mg chewable tablet Take 1 Tab by mouth daily. 30 Tab 0    albuterol (PROVENTIL HFA, VENTOLIN HFA, PROAIR HFA) 90 mcg/actuation inhaler Take 1 Puff by inhalation every four (4) hours as needed for Wheezing. 1 Inhaler 0    budesonide-formoterol (SYMBICORT) 160-4.5 mcg/actuation HFA inhaler Take 2 Puffs by inhalation two (2) times a day. Allergies   Allergen Reactions    Penicillins Unknown (comments)     Per patient, 40 years ago, but has not taken since     Past Medical History:   Diagnosis Date    Chronic obstructive pulmonary disease ([facility] [address]      Past Surgical History:   Procedure Laterality Date    HX GYN      [place] ABDOMEN SURGERY PROC UNLISTED       No family history on file. Social History     Tobacco Use   Smoking Status Current Every Day Smoker    Packs/day: 0.50   Smokeless Tobacco Never Used          Review of Systems, additional:  Constitutional: negative  Eyes: negative  Respiratory: negative  Cardiovascular: negative  Gastrointestinal: negative  Musculoskeletal:negative  Neurological: negative  Behvioral/Psych: negative  Endocrine: negative  ENT: negative    Objective:     Visit Vitals  BP (!) 141/73 (BP 1 Location: Left upper arm, BP Patient Position: Sitting, BP Cuff Size: Small adult)   Pulse 94   Temp 97.9 °F (36.6 °C) (Temporal)   Wt 113 lb (51.3 kg)   SpO2 92%   BMI 18.80 kg/m²     General:  alert, cooperative, no distress   Chest Wall: inspection normal - no chest wall deformities or tenderness, respiratory effort normal   Lung: clear to auscultation bilaterally   Heart:  normal rate and regular rhythm, S1 and S2 normal, no murmurs noted, no gallops noted   Abdomen: soft, non-tender.  Bowel sounds normal. No masses,  no organomegaly   Extremities: extremities normal, atraumatic, no cyanosis or edema Skin: no rashes   Neuro: alert, oriented, normal speech, no focal findings or movement disorder noted         Assessment/Plan:       ICD-10-CM ICD-9-CM    1. Hypotension due to drugs, resolved I95.2 458.8      E980.5    2. Cardiomyopathy, unspecified type, Systolic function near normal 2/22/2017. Stable. Return in 6 months I42.9 425.4    3. Abnormal CT scan, chest, followed by Dr Jayna Beltran R93.8 793.2    4. Hx of sepsis Z86.19 V12.09    5       Normal nuclear stress test 01/16/2017  6       Falls, no additional fall since last appointment. Offered balance/PT opportunity again. 7       Chest pain, easy fatigability, will order nuclear stress test.  Return in 6 months.

## 2021-08-24 NOTE — COMMUNICATION BODY
Subjective:      Amisha Mendieta is in the office today for cardiac reevaluation. She is a 59-year-old woman that was recently hospitalized at [facility] for pneumonia. During the course of her evaluation, she had an echocardiogram, which demonstrated marked reduction of left ventricular systolic function. Her ejection fraction was 30-35%. She also had grade I diastolic dysfunction. She was started on a combination of low-dose betablockers and ACE inhibitor. The patient had no prior known history of cardiomyopathy. Prior to her discharge, she had a nuclear stress test to rule out an ischemic basis for her cardiomyopathy. She had no significant ongoing ischemia and her ejection fraction was 69%. That study was done on 01/16/2017. Her echocardiogram was recently repeated. Her ejection fraction now is 50-55%. She continues to have low blood pressures. She is not having any dizziness, near-syncope or syncope. She had a CT scan of the chest recently repeated. She will follow up with Danny Johnson next week in that regard. Her main complaint today is that she continues to be excessively fatigued. Patient Active Problem List    Diagnosis Date Noted    Cardiomyopathy [facility]) 02/26/2017    Abnormal CT scan, chest 02/26/2017    Pleural effusion 02/05/2017    Hypotension due to drugs 02/05/2017    Pneumonia 01/05/2017    Sepsis ([facility] [address] 01/05/2017     Current Outpatient Prescriptions   Medication Sig Dispense Refill    INCRUSE ELLIPTA 62.5 mcg/actuation inhaler INL 1 PUFF PO D  3    cyanocobalamin 1,000 mcg tablet Take 1,000 mcg by mouth daily.  OXYGEN-AIR DELIVERY SYSTEMS by Does Not Apply route. 3 LPM O2 at home      furosemide (LASIX) 20 mg tablet TAKE 1 TABLET BY MOUTH EVERY OTHER DAY 45 Tab 5    aspirin 81 mg chewable tablet Take 1 Tab by mouth daily.  30 Tab 0    albuterol (PROVENTIL HFA, VENTOLIN HFA, PROAIR HFA) 90 mcg/actuation inhaler Take 1 Puff by inhalation every four (4) hours as needed for Wheezing. 1 Inhaler 0    budesonide-formoterol (SYMBICORT) 160-4.5 mcg/actuation HFA inhaler Take 2 Puffs by inhalation two (2) times a day. Allergies   Allergen Reactions    Penicillins Unknown (comments)     Per patient, 40 years ago, but has not taken since     Past Medical History:   Diagnosis Date    Chronic obstructive pulmonary disease ([facility] [address]      Past Surgical History:   Procedure Laterality Date    ABDOMEN SURGERY PROC UNLISTED      HX GYN       No family history on file. History   Smoking Status    Current Every Day Smoker    Packs/day: 0.50   Smokeless Tobacco    Not on file          Review of Systems, additional:  Constitutional: negative  Eyes: negative  Respiratory: negative  Cardiovascular: positive for fatigue  Gastrointestinal: negative  Musculoskeletal:negative  Neurological: negative  Behvioral/Psych: negative  Endocrine: negative  ENT: negative    Objective:     Visit Vitals    BP (!) 78/48    Pulse 96    Ht 5' 5\" (1.651 m)    Wt 53.1 kg (117 lb)    SpO2 94%    BMI 19.47 kg/m2     General:  alert, cooperative, no distress   Chest Wall: inspection normal - no chest wall deformities or tenderness, respiratory effort normal   Lung: clear to auscultation bilaterally   Heart:  normal rate and regular rhythm, S1 and S2 normal, no murmurs noted, no gallops noted   Abdomen: soft, non-tender. Bowel sounds normal. No masses,  no organomegaly   Extremities: extremities normal, atraumatic, no cyanosis or edema Skin: no rashes   Neuro: alert, oriented, normal speech, no focal findings or movement disorder noted       Assessment/Plan:       ICD-10-CM ICD-9-CM    1. Hypotension due to drugs I95.2 458.8      E980.5    2. Pleural effusion J90 511.9    3. Pneumonia of right lung due to infectious organism, unspecified part of lung J18.9 483.8    4. [facility] EF improved to 50-55%. Will DC carvedilol and lisinopril.  F/U in 6 weeks I42.9 425.4    5.  Abnormal CT scan, chest, sees Dr Minnie Travis next week R93.8 793.2 85

## 2021-12-08 NOTE — PROGRESS NOTES
Identified pt with two pt identifiers(name and ). Reviewed record in preparation for visit and have obtained necessary documentation. Brian Acosta presents today for   Chief Complaint   Patient presents with    Follow-up     10m       Brian Acosta preferred language for health care discussion is english/other. Personal Protective Equipment:   Personal Protective Equipment was used including: mask-surgical and hands-gloves. Patient was placed on no precaution(s). Patient was masked. Precautions:   Patient currently on None  Patient currently roomed with door closed    Is someone accompanying this pt? Yes     Is the patient using any DME equipment during [address]? no    Depression Screening:  3 most recent PHQ Screens 2021   Little interest or pleasure in doing things Not at all   Feeling down, depressed, irritable, or hopeless Not at all   Total Score PHQ 2 0       Learning Assessment:  Learning Assessment 2017   PRIMARY LEARNER Patient   PRIMARY LANGUAGE ENGLISH   LEARNER PREFERENCE PRIMARY OTHER (COMMENT)   ANSWERED BY Patient   RELATIONSHIP SELF       Abuse Screening:  No flowsheet data found. Fall Risk  Fall Risk Assessment, last 12 mths 2021   Able to walk? Yes   Fall in past 12 months? -   Do you feel unsteady? -   Are you worried about falling -       Pt currently taking Anticoagulant therapy? no    Coordination of Care:  1. Have you been to the ER, urgent care clinic since your last visit? Hospitalized since your last visit? no    2. Have you seen or consulted any other health care providers outside of the [address] [place] [address] since your last visit? Include any pap smears or colon screening. no      Please see Red banners under Allergies and Med Rec to remove outside inquires. All correct information has been verified with patient and added to chart.      Medication's patient's would liked removed has been marked not taking to be removed per Verbal order and read back per Yari York MD

## 2021-12-18 NOTE — PROGRESS NOTES
Subjective:      Herber Fang is in the office today for cardiac reevaluation. She is a 27-year-old woman that was hospitalized at [facility] in October of 2015. At that time, she had pneumonia and sepsis syndrome. During the course of her evaluation, she had an echocardiogram, which demonstrated a marked reduction in left ventricular systolic function. Her ejection fraction was 30-35%. She was started on a combination of low-dose betablockers and ACE inhibitors. A nuclear stress test was done prior to her discharge which showed no evidence for ischemia. Her ejection fraction was 69% on the nuclear stress test that was done on 01/16/2017. Her echocardiogram was repeated in February of 2017. Her EF had returned to near normal at 50-55%. She continued to have low blood pressures while on the betablocker and ACE inhibitor, and the meds were discontinued. She did have an abnormal chest CT scan, which has been followed up by Dr. Angie Lincoln. She was informed that the evaluation was complete and that there was no significant problem. In the office today, she reports the shortness of breath remains her main concern. She has had no chest pain. She has had no fever or chills. She has had no palpitations, near-syncope or syncope. Patient Active Problem List    Diagnosis Date Noted    PULLIAM (dyspnea on exertion) 01/29/2019    At risk for falls 01/29/2019    Frailty 11/18/2018    History of stress test 10/22/2017    Hx of sepsis 04/12/2017    Cardiomyopathy ([facility] [address] 02/26/2017    Abnormal CT scan, chest 02/26/2017    Pleural effusion 02/05/2017    Hypotension due to drugs 02/05/2017    Pneumonia 01/05/2017     Current Outpatient Medications   Medication Sig Dispense Refill    atorvastatin (LIPITOR) 20 mg tablet TK 1 T PO QHS      THIAMINE HCL, VITAMIN B1, PO Take  by Mouth.  cholecalciferol, VITAMIN D3, (VITAMIN D3) 5,000 unit tab tablet Take  by mouth daily.       cyanocobalamin 1,000 mcg tablet Take 1,000 mcg by mouth daily.  albuterol (PROVENTIL HFA, VENTOLIN HFA, PROAIR HFA) 90 mcg/actuation inhaler Take 1 Puff by inhalation every four (4) hours as needed for Wheezing. 1 Inhaler 0    budesonide-formoterol (SYMBICORT) 160-4.5 mcg/actuation HFA inhaler Take 2 Puffs by inhalation two (2) times a day. Allergies   Allergen Reactions    Penicillins Unknown (comments)     Per patient, 40 years ago, but has not taken since     Past Medical History:   Diagnosis Date    Chronic obstructive pulmonary disease ([facility] [address]      Past Surgical History:   Procedure Laterality Date    HX GYN      [place] ABDOMEN SURGERY PROC UNLISTED       No family history on file. Social History     Tobacco Use   Smoking Status Current Every Day Smoker    Packs/day: 0.50   Smokeless Tobacco Never Used          Review of Systems, additional:  Constitutional: negative  Eyes: negative  Respiratory: negative  Cardiovascular: negative  Gastrointestinal: negative  Musculoskeletal:negative  Neurological: negative  Behvioral/Psych: negative  Endocrine: negative  ENT: negative    Objective:     Visit Vitals  /64   Pulse 90   Temp 99.1 °F (37.3 °C) (Temporal)   Resp 16   Wt 48.5 kg (107 lb)   SpO2 94%   BMI 17.81 kg/m²     General:  alert, cooperative, no distress   Chest Wall: inspection normal - no chest wall deformities or tenderness, respiratory effort normal   Lung:  Reduced breath sounds right base. Heart:  normal rate and regular rhythm, S1 and S2 normal, no murmurs noted, no gallops noted   Abdomen: soft, non-tender. Bowel sounds normal. No masses,  no organomegaly   Extremities: extremities normal, atraumatic, no cyanosis or edema Skin: no rashes   Neuro: alert, oriented, normal speech, no focal findings or movement disorder noted     Twelve-lead ECG. Sinus rhythm. Normal tracing    Assessment/Plan:       ICD-10-CM ICD-9-CM    1. Hypotension due to medications, resolved I95.2 458.8      E980.5    2. Cardiomyopathy, unspecified type, Systolic function near normal 2/22/2017. Stable. Return in 6 months I42.9 425.4    3. Abnormal CT scan, chest, followed by Dr Batool March R93.8 793.2    4. Hx of sepsis Z86.19 V12.09    5       Normal nuclear stress test 01/16/2017  6       Falls, no additional fall since last appointment. Offered balance/PT opportunity again. 7       Chest pain, easy fatigability,  nuclear stress test but not done. Patient has reduced breath sounds in the right base. Will order chest x-ray return in 6 months.

## 2022-01-01 ENCOUNTER — OFFICE VISIT (OUTPATIENT)
Dept: CARDIOLOGY CLINIC | Age: 77
End: 2022-01-01

## 2022-01-01 ENCOUNTER — TELEPHONE (OUTPATIENT)
Dept: CARDIOLOGY CLINIC | Age: 77
End: 2022-01-01

## 2022-01-01 ENCOUNTER — OFFICE VISIT (OUTPATIENT)
Dept: CARDIOLOGY CLINIC | Age: 77
End: 2022-01-01
Payer: MEDICARE

## 2022-01-01 VITALS
SYSTOLIC BLOOD PRESSURE: 132 MMHG | DIASTOLIC BLOOD PRESSURE: 73 MMHG | TEMPERATURE: 98.7 F | OXYGEN SATURATION: 92 % | HEART RATE: 101 BPM | RESPIRATION RATE: 18 BRPM | BODY MASS INDEX: 18.47 KG/M2 | WEIGHT: 111 LBS

## 2022-01-01 VITALS
SYSTOLIC BLOOD PRESSURE: 129 MMHG | RESPIRATION RATE: 14 BRPM | OXYGEN SATURATION: 93 % | DIASTOLIC BLOOD PRESSURE: 66 MMHG | BODY MASS INDEX: 18.47 KG/M2 | HEART RATE: 97 BPM | TEMPERATURE: 99 F | WEIGHT: 111 LBS

## 2022-01-01 DIAGNOSIS — R06.02 SOB (SHORTNESS OF BREATH): ICD-10-CM

## 2022-01-01 DIAGNOSIS — R06.89 DECREASED BREATH SOUNDS AT RIGHT LUNG BASE: ICD-10-CM

## 2022-01-01 DIAGNOSIS — R60.9 SWELLING: Primary | ICD-10-CM

## 2022-01-01 DIAGNOSIS — R60.9 SWELLING: ICD-10-CM

## 2022-01-01 DIAGNOSIS — R06.02 SOB (SHORTNESS OF BREATH): Primary | ICD-10-CM

## 2022-01-01 DIAGNOSIS — L30.9 DERMATITIS: ICD-10-CM

## 2022-01-01 PROCEDURE — 99214 OFFICE O/P EST MOD 30 MIN: CPT | Performed by: INTERNAL MEDICINE

## 2022-01-01 PROCEDURE — G8419 CALC BMI OUT NRM PARAM NOF/U: HCPCS | Performed by: INTERNAL MEDICINE

## 2022-01-01 PROCEDURE — G8427 DOCREV CUR MEDS BY ELIG CLIN: HCPCS | Performed by: INTERNAL MEDICINE

## 2022-01-01 PROCEDURE — G8510 SCR DEP NEG, NO PLAN REQD: HCPCS | Performed by: INTERNAL MEDICINE

## 2022-01-01 PROCEDURE — G8400 PT W/DXA NO RESULTS DOC: HCPCS | Performed by: INTERNAL MEDICINE

## 2022-01-01 PROCEDURE — 1090F PRES/ABSN URINE INCON ASSESS: CPT | Performed by: INTERNAL MEDICINE

## 2022-01-01 PROCEDURE — G8536 NO DOC ELDER MAL SCRN: HCPCS | Performed by: INTERNAL MEDICINE

## 2022-01-01 PROCEDURE — 1101F PT FALLS ASSESS-DOCD LE1/YR: CPT | Performed by: INTERNAL MEDICINE

## 2022-01-01 PROCEDURE — 1123F ACP DISCUSS/DSCN MKR DOCD: CPT | Performed by: INTERNAL MEDICINE

## 2022-01-01 RX ORDER — FUROSEMIDE 20 MG/1
TABLET ORAL
COMMUNITY

## 2022-06-08 NOTE — PROGRESS NOTES
Identified pt with two pt identifiers(name and ). Reviewed record in preparation for visit and have obtained necessary documentation. Loren Helton presents today for   Chief Complaint   Patient presents with    Follow-up     6m       Pt c/o SOB and SWELLING. Hailee Mckeon preferred language for health care discussion is english/other. Personal Protective Equipment:   Personal Protective Equipment was used including: mask-surgical and hands-gloves. Patient was placed on no precaution(s). Patient was masked. Precautions:   Patient currently on None  Patient currently roomed with door closed. Is someone accompanying this pt? no    Is the patient using any DME equipment during [address]? no    Depression Screening:  3 most recent PHQ Screens 2022   Little interest or pleasure in doing things Not at all   Feeling down, depressed, irritable, or hopeless Not at all   Total Score PHQ 2 0       Learning Assessment:  Learning Assessment 2017   PRIMARY LEARNER Patient   PRIMARY LANGUAGE ENGLISH   LEARNER PREFERENCE PRIMARY OTHER (COMMENT)   ANSWERED BY Patient   RELATIONSHIP SELF       Abuse Screening:  No flowsheet data found. Fall Risk  Fall Risk Assessment, last 12 mths 2022   Able to walk? Yes   Fall in past 12 months? -   Do you feel unsteady? -   Are you worried about falling -       Pt currently taking Anticoagulant therapy? no  Pt currently taking Antiplatelet therapy? no    Coordination of Care:  1. Have you been to the ER, urgent care clinic since your last visit? Hospitalized since your last visit? no    2. Have you seen or consulted any other health care providers outside of the [address] [place] [address] since your last visit? Include any pap smears or colon screening. no      Please see Red banners under Allergies and Med Rec to remove outside inquires. All correct information has been verified with patient and added to chart.      Medication's patient's would liked removed has been marked not taking to be removed per Verbal order and read back per Nicole Mathews MD

## 2022-06-08 NOTE — PATIENT INSTRUCTIONS
Testing   Echo**call office 3-5 days after testing is completed for results**       Other Testing\  Chest xray to be faxed to [facility]

## 2022-06-20 NOTE — PROGRESS NOTES
Subjective:      Stacey Gordon is in the office today for cardiac reevaluation. She is a 42-year-old woman that was hospitalized at [facility] in October of 2015. At that time, she had pneumonia and sepsis syndrome. During the course of her evaluation, she had an echocardiogram, which demonstrated a marked reduction in left ventricular systolic function. Her ejection fraction was 30-35%. She was started on a combination of low-dose betablockers and ACE inhibitors. A nuclear stress test was done prior to her discharge which showed no evidence for ischemia. Her ejection fraction was 69% on the nuclear stress test that was done on 01/16/2017. An echocardiogram was repeated in February of 2017. Her EF had returned to near normal at 50-55%. She continued to have low blood pressures while on the betablocker and ACE inhibitor, and the meds were discontinued. She did have an abnormal chest CT scan, which has been followed up by Dr. Tom Pace. She was informed that the evaluation was complete and that there was no significant problem. In the office today, she reports more shortness of breath. She was short of breath walking from the parking lot to the office today. She has had no PND. She has had no orthopnea. She has had no chest pain. She has had no palpitations. She follows her O2 sats at home and her oximetry has been okay.   Patient Active Problem List    Diagnosis Date Noted    PULLIAM (dyspnea on exertion) 01/29/2019    At risk for falls 01/29/2019    Frailty 11/18/2018    History of stress test 10/22/2017    Hx of sepsis 04/12/2017    Cardiomyopathy ([facility] [address] 02/26/2017    Abnormal CT scan, chest 02/26/2017    Pleural effusion 02/05/2017    Hypotension due to drugs 02/05/2017    Pneumonia 01/05/2017     Current Outpatient Medications   Medication Sig Dispense Refill    atorvastatin (LIPITOR) 20 mg tablet TK 1 T PO QHS      cholecalciferol, VITAMIN D3, (VITAMIN D3) 5,000 unit tab tablet Take  by mouth daily.  albuterol (PROVENTIL HFA, VENTOLIN HFA, PROAIR HFA) 90 mcg/actuation inhaler Take 1 Puff by inhalation every four (4) hours as needed for Wheezing. 1 Inhaler 0    budesonide-formoterol (SYMBICORT) 160-4.5 mcg/actuation HFA inhaler Take 2 Puffs by inhalation two (2) times a day. Allergies   Allergen Reactions    Penicillins Unknown (comments)     Per patient, 40 years ago, but has not taken since     Past Medical History:   Diagnosis Date    Chronic obstructive pulmonary disease ([facility] [address]      Past Surgical History:   Procedure Laterality Date    HX GYN      [place] ABDOMEN SURGERY PROC UNLISTED       No family history on file. Social History     Tobacco Use   Smoking Status Current Every Day Smoker    Packs/day: 0.50   Smokeless Tobacco Never Used          Review of Systems, additional:  Constitutional: negative  Eyes: negative  Respiratory: negative  Cardiovascular: negative  Gastrointestinal: negative  Musculoskeletal:negative  Neurological: negative  Behvioral/Psych: negative  Endocrine: negative  ENT: negative    Objective:     Visit Vitals  /66   Pulse 97   Temp 99 °F (37.2 °C) (Temporal)   Resp 14   Wt 50.3 kg (111 lb)   SpO2 93%   BMI 18.47 kg/m²     General:  alert, cooperative, no distress   Chest Wall: inspection normal - no chest wall deformities or tenderness, respiratory effort normal   Lung:  Reduced breath sounds right base. Heart:  normal rate and regular rhythm, S1 and S2 normal, no murmurs noted, no gallops noted   Abdomen: soft, non-tender. Bowel sounds normal. No masses,  no organomegaly   Extremities: extremities normal, atraumatic, no cyanosis or edema Skin: no rashes   Neuro: alert, oriented, normal speech, no focal findings or movement disorder noted     Twelve-lead ECG. 12/8/2021. Sinus rhythm. Normal tracing    Assessment/Plan:       ICD-10-CM ICD-9-CM    1. Hypotension due to medications, resolved I95.2 458.8      E980.5    2. Cardiomyopathy, unspecified type, Systolic function near normal 2/22/2017. Stable. I42.9 425.4    3. Abnormal CT scan, chest, followed by Dr Bruce Arauz R93.8 793.2    4. Hx of sepsis Z86.19 V12.09    5       Normal nuclear stress test 01/16/2017  6       Falls, no additional fall since last appointment. Offered balance/PT   7       Chest pain, easy fatigability, ordered a nuclear stress test but not done. Patient has reduced breath sounds in the right base.   Will order chest x-ray return in 4 to 5 weeks

## 2022-07-22 NOTE — PROGRESS NOTES
Identified pt with two pt identifiers(name and ). Reviewed record in preparation for visit and have obtained necessary documentation. Isaiah Taylor presents today for   Chief Complaint   Patient presents with    Cardiac Testing       Pt c/o OB, SWELLING BILATERAL ANKLE AND FEET. Hailee Mckeon preferred language for health care discussion is english/other. Personal Protective Equipment:   Personal Protective Equipment was used including: mask-surgical and hands-gloves. Patient was placed on no precaution(s). Patient was masked. Precautions:   Patient currently on None  Patient currently roomed with door closed. Is someone accompanying this pt?     Is the patient using any DME equipment during OV? NO    Depression Screening:  3 most recent PHQ Screens 2022   Little interest or pleasure in doing things Not at all   Feeling down, depressed, irritable, or hopeless Not at all   Total Score PHQ 2 0       Learning Assessment:  Learning Assessment 2017   PRIMARY LEARNER Patient   PRIMARY LANGUAGE ENGLISH   LEARNER PREFERENCE PRIMARY OTHER (COMMENT)   ANSWERED BY Patient   RELATIONSHIP SELF       Abuse Screening:  No flowsheet data found. Fall Risk  Fall Risk Assessment, last 12 mths 2022   Able to walk? Yes   Fall in past 12 months? -   Do you feel unsteady? -   Are you worried about falling -       Pt currently taking Anticoagulant therapy? NO  Pt currently taking Antiplatelet therapy? NO    Coordination of Care:  1. Have you been to the ER, urgent care clinic since your last visit? Hospitalized since your last visit? NO    2. Have you seen or consulted any other health care providers outside of the [address] [place] [address] since your last visit? Include any pap smears or colon screening. YES      Please see Red banners under Allergies and Med Rec to remove outside inquires. All correct information has been verified with patient and added to chart.      Medication's patient's would liked removed has been marked not taking to be removed per Verbal order and read back per Colin Elizabeth MD

## 2022-07-31 NOTE — PROGRESS NOTES
Subjective:      Jessi Del Angel is in the office today for cardiac reevaluation. She is a 80-year-old woman that was hospitalized at [facility] in October of 2015. At that time, she had pneumonia and sepsis syndrome. During the course of her evaluation, she had an echocardiogram, which demonstrated a marked reduction in left ventricular systolic function. Her ejection fraction was 30-35%. She was started on a combination of low-dose betablockers and ACE inhibitors. A nuclear stress test was done prior to her discharge which showed no evidence for ischemia. Her ejection fraction was 69% on the nuclear stress test that was done on 01/16/2017. An echocardiogram was repeated in February of 2017. Her EF had returned to near normal at 50-55%. She continued to have low blood pressures while on the betablocker and ACE inhibitor, and the meds were discontinued. She did have an abnormal chest CT scan, which has been followed up by Dr. Lucy Dalal. She was informed that the evaluation was complete and that there was no significant problem. In the office on 6/8/2022 she reported more shortness of breath. She was short of breath walking from the parking lot to the office today. She has had no PND. She has had no orthopnea. She had no chest pain. She  had no palpitations. She was following her O2 sats at home and her oximetry had been okay. A echocardiogram was ordered and completed with results listed below. Her legs remain tensely edematous with erythema. She has had no fever or chills.   Patient Active Problem List    Diagnosis Date Noted    PULLIAM (dyspnea on exertion) 01/29/2019    At risk for falls 01/29/2019    Frailty 11/18/2018    History of stress test 10/22/2017    Hx of sepsis 04/12/2017    Cardiomyopathy ([facility] [address] 02/26/2017    Abnormal CT scan, chest 02/26/2017    Pleural effusion 02/05/2017    Hypotension due to drugs 02/05/2017    Pneumonia 01/05/2017     Current Outpatient Medications   Medication Sig Dispense Refill    furosemide (Lasix) 20 mg tablet Take  by mouth every three (3) days. atorvastatin (LIPITOR) 20 mg tablet TK 1 T PO QHS      cholecalciferol, VITAMIN D3, (VITAMIN D3) 5,000 unit tab tablet Take  by mouth daily. albuterol (PROVENTIL HFA, VENTOLIN HFA, PROAIR HFA) 90 mcg/actuation inhaler Take 1 Puff by inhalation every four (4) hours as needed for Wheezing. 1 Inhaler 0    budesonide-formoteroL (SYMBICORT) 160-4.5 mcg/actuation HFAA Take 2 Puffs by inhalation two (2) times a day. Allergies   Allergen Reactions    Penicillins Unknown (comments)     Per patient, 40 years ago, but has not taken since     Past Medical History:   Diagnosis Date    Chronic obstructive pulmonary disease ([facility] [address]      Past Surgical History:   Procedure Laterality Date    HX GYN      GA ABDOMEN SURGERY PROC UNLISTED       No family history on file. Social History     Tobacco Use   Smoking Status Every Day    Packs/day: 0.50    Types: Cigarettes   Smokeless Tobacco Never          Review of Systems, additional:  Constitutional: negative  Eyes: negative  Respiratory: negative  Cardiovascular: negative  Gastrointestinal: negative  Musculoskeletal:negative  Neurological: negative  Behvioral/Psych: negative  Endocrine: negative  ENT: negative    Objective:     Visit Vitals  /73   Pulse (!) 101   Temp 98.7 °F (37.1 °C) (Temporal)   Resp 18   Wt 50.3 kg (111 lb)   SpO2 92%   BMI 18.47 kg/m²     General:  alert, cooperative, no distress   Chest Wall: inspection normal - no chest wall deformities or tenderness, respiratory effort normal   Lung:  Reduced breath sounds right base. Heart:  normal rate and regular rhythm, S1 and S2 normal, no murmurs noted, no gallops noted   Abdomen: soft, non-tender.  Bowel sounds normal. No masses,  no organomegaly   Extremities: extremities normal, atraumatic, no cyanosis or edema Skin: no rashes   Neuro: alert, oriented, normal speech, no focal findings or movement disorder noted     Twelve-lead ECG. 12/8/2021. Sinus rhythm. Normal tracing    Assessment/Plan:       ICD-10-CM ICD-9-CM    1. Hypotension due to medications, resolved I95.2 458.8      E980.5    2. Cardiomyopathy, unspecified type, Systolic function near normal 2/22/2017. Echocardiogram repeated 7/14/2022. EF 55 to 60%. Mild pulmonary hypertension. No significant valvular pathology. I42.9 425.4    3. Abnormal CT scan, chest, followed by Dr Lore Rivera R93.8 793.2    4. Hx of sepsis Z86.19 V12.09    5       Normal nuclear stress test 01/16/2017  6       Falls, no additional fall since last appointment. Offered balance/PT   7       Lower extremity edema/rash, will order venous and arterial duplex studies of the lower extremities. Will refer to dermatology.   Return in 6 weeks

## 2022-08-01 NOTE — TELEPHONE ENCOUNTER
Patient is asking for a call back about some testing that was schedule. Patient is asking to speak to a nurse about this.  Thank you

## 2022-08-01 NOTE — TELEPHONE ENCOUNTER
Attempted to contact pt at  number, no answer. Unable to leave . Will continue to try to contact pt.

## 2022-08-02 NOTE — TELEPHONE ENCOUNTER
Contacted pt at [facility] number. Two patient Identifiers confirmed. Advised order was refaxed to [facility] with confirmation of receipt. Pt verbalized understanding.

## 2022-08-02 NOTE — TELEPHONE ENCOUNTER
Contacted pt at [facility] number. Two patient Identifiers confirmed. Advised she just needed the scheduling number for pa. [phone] given to pt to schedule imaging. Imaging order x 2 refaxed to pa. No other issues noted.

## 2022-08-04 NOTE — TELEPHONE ENCOUNTER
Called [facility] central scheduling and rep stated that pt can call [phone] to schedule imaging. Contacted pt at [facility]. Two patient Identifiers confirmed. Advised pt that she can call above number to schedule her duplex venous/arterial doppler. Advised pt to call back if she has any issues scheduling. Pt verbalized understanding.

## 2024-03-01 NOTE — PROGRESS NOTES
[facility]  Hospitalist Division        Inpatient Daily Progress Note    Daily progress Note    Patient: Edin Black MRN: [id number]  CSN: [id number]    YOB: 1945  Age: 70 y.o. Sex: female    DOA: 1/5/2017 LOS:  LOS: 4 days                    Chief Complaint:        Subjective:      Reports shortness of breath with ambulation, increased fatigue. Requiring O2 this morning. Objective:      Visit Vitals    /90    Pulse (!) 111    Temp 97.2 °F (36.2 °C)    Resp 20    Ht 5' 5\" (1.651 m)    Wt 54.4 kg (120 lb)    SpO2 92%    BMI 19.97 kg/m2           Physical Exam:  General appearance: alert and oriented, cooperative, appears stated age  Head: Normocephalic, without obvious abnormality, atraumatic  Lungs: fines crackles to bilateral bases, diminished bilateral upper lobes   Heart: regular rate and rhythm, S1, S2 normal, no murmur, click, rub or gallop  Abdomen: soft, non tender, non distended. Normoactive bowel sounds  Extremities: extremities normal, atraumatic, no cyanosis. BLE trace edema   Skin: Skin color, texture, turgor normal. No rashes or lesions  Neurologic: moves all 4 extremities   PSY: Mood and affect normal, appropriately behaved        Intake and Output:  Current Shift:     Last three shifts:  01/07 1901 - 01/09 0700  In: 2764.6 [P.O.:500; I.V.:2264.6]  Out: 1700 [Urine:1700]    No results found for this or any previous visit (from the past 24 hour(s)).         Lab Results   Component Value Date/Time    Glucose 101 01/06/2017 04:00 AM    Glucose 66 01/05/2017 05:32 PM        Assessment/Plan:     Patient Active Problem List   Diagnosis Code    Pneumonia J18.9    Sepsis ([address] A41.9       A/P:  CAP: continue Azithromycin and Rocephin, O2   COPD: continue Duo-nebs, Brovana, Pulmicort and Prednisone   DVT prophylaxis: Lovenox   Walking test ordered- discussed with Primary RN   PT eval and treat       [facility]  Hospitalist Division  Pager:  [phone]  Office:  [phone] Body Location Override (Optional - Billing Will Still Be Based On Selected Body Map Location If Applicable): left distal posterior upper arm Detail Level: Detailed Add 1585x Cpt? (Do Not Bill If You Billed For The Procedure Placing The Sutures. This Is An Add-On Code That Must Be Billed With An E/M Visit Code): No